# Patient Record
Sex: FEMALE | Race: BLACK OR AFRICAN AMERICAN | NOT HISPANIC OR LATINO | Employment: FULL TIME | URBAN - METROPOLITAN AREA
[De-identification: names, ages, dates, MRNs, and addresses within clinical notes are randomized per-mention and may not be internally consistent; named-entity substitution may affect disease eponyms.]

---

## 2020-08-07 ENCOUNTER — TRANSFERRED RECORDS (OUTPATIENT)
Dept: MULTI SPECIALTY CLINIC | Facility: CLINIC | Age: 30
End: 2020-08-07

## 2020-08-07 LAB
HPV ABSTRACT: NORMAL
PAP-ABSTRACT: NORMAL

## 2022-01-29 ENCOUNTER — OFFICE VISIT (OUTPATIENT)
Dept: FAMILY MEDICINE | Facility: CLINIC | Age: 32
End: 2022-01-29
Payer: COMMERCIAL

## 2022-01-29 VITALS
DIASTOLIC BLOOD PRESSURE: 78 MMHG | HEART RATE: 76 BPM | OXYGEN SATURATION: 100 % | SYSTOLIC BLOOD PRESSURE: 114 MMHG | WEIGHT: 232 LBS | RESPIRATION RATE: 18 BRPM | TEMPERATURE: 99 F

## 2022-01-29 DIAGNOSIS — M94.0 COSTOCHONDRITIS: ICD-10-CM

## 2022-01-29 DIAGNOSIS — J45.21 MILD INTERMITTENT ASTHMA WITH EXACERBATION: Primary | ICD-10-CM

## 2022-01-29 PROCEDURE — 99204 OFFICE O/P NEW MOD 45 MIN: CPT | Mod: 25 | Performed by: PHYSICIAN ASSISTANT

## 2022-01-29 PROCEDURE — 96372 THER/PROPH/DIAG INJ SC/IM: CPT | Performed by: PHYSICIAN ASSISTANT

## 2022-01-29 RX ORDER — MONTELUKAST SODIUM 10 MG/1
10 TABLET ORAL AT BEDTIME
Qty: 30 TABLET | Refills: 1 | Status: SHIPPED | OUTPATIENT
Start: 2022-01-29 | End: 2022-03-15

## 2022-01-29 RX ORDER — CLINDAMYCIN PHOSPHATE 10 UG/ML
LOTION TOPICAL
COMMUNITY
Start: 2021-11-12 | End: 2022-03-15

## 2022-01-29 RX ORDER — TRETINOIN 0.5 MG/G
1 CREAM TOPICAL
COMMUNITY
Start: 2021-11-12 | End: 2022-03-15

## 2022-01-29 RX ORDER — METHYLPREDNISOLONE SOD SUCC 125 MG
125 VIAL (EA) INJECTION ONCE
Status: COMPLETED | OUTPATIENT
Start: 2022-01-29 | End: 2022-01-29

## 2022-01-29 RX ORDER — DOXYCYCLINE 100 MG/1
100 CAPSULE ORAL
COMMUNITY
Start: 2021-11-12 | End: 2022-03-15

## 2022-01-29 RX ORDER — DIPHENHYDRAMINE HCL 25 MG
25 CAPSULE ORAL
COMMUNITY
Start: 2021-06-20 | End: 2022-03-15

## 2022-01-29 RX ORDER — ALBUTEROL SULFATE 90 UG/1
2 AEROSOL, METERED RESPIRATORY (INHALATION) EVERY 6 HOURS
Qty: 18 G | Refills: 0 | Status: SHIPPED | OUTPATIENT
Start: 2022-01-29 | End: 2022-03-15

## 2022-01-29 RX ORDER — ALBUTEROL SULFATE 90 UG/1
AEROSOL, METERED RESPIRATORY (INHALATION)
COMMUNITY
End: 2022-03-15

## 2022-01-29 RX ORDER — ALBUTEROL SULFATE 90 UG/1
1-2 AEROSOL, METERED RESPIRATORY (INHALATION)
COMMUNITY
Start: 2022-01-19

## 2022-01-29 RX ADMIN — Medication 125 MG: at 15:57

## 2022-01-29 ASSESSMENT — ENCOUNTER SYMPTOMS
COUGH: 1
CARDIOVASCULAR NEGATIVE: 1
NEUROLOGICAL NEGATIVE: 1
CONSTITUTIONAL NEGATIVE: 1
WHEEZING: 1
SHORTNESS OF BREATH: 1

## 2022-01-29 NOTE — PATIENT INSTRUCTIONS
Patient Education     Discharge Instructions for Asthma  You have been diagnosed with an asthma attack. With the help of your healthcare provider, you can keep your asthma under control and have less emergency department visits and stays in the hospital.    Managing asthma    Take your asthma medicines exactly as your provider tells you. Do this even if you feel that your athma is under control.    Learn how to monitor your asthma. Some people watch for early changes of symptoms getting worse. Some use a peak flow meter. Your healthcare provider may decide to give you an asthma action plan.    Be sure to always have a quick-relief inhaler with you. If you were given a prescription, make sure you go to a pharmacy to get it filled as soon as possible.  Controlling asthma triggers  Triggers are those things that make your asthma symptoms worse or cause asthma attacks. Many people with asthma have allergies that can be triggers. Your healthcare provider may have you get allergy testing to find out what you are allergic to. This can help you stay away from triggers.  Dust or dust mites are a common asthma trigger. To avoid a dust mites, do the following:    Use dust-proof covers on your mattress and pillows. Wash the sheets and blankets on your bed once a week in very hot water.    Don t sleep or lie on cloth-covered cushions or furniture.    Ask someone else to vacuum and dust your house.    If you do vacuum and dust yourself, wear a dust mask. You can buy them from the [employer] store.    Use a vacuum with a double-layered bag or HEPA (high-efficiency particulate air) filter.  Pets with fur or feathers are triggers for some people. If you must have pets, take these precautions:    Keep pets out of your bedroom and off your bed. Keep the bedroom door closed.    Cover the air vents in your bedroom with heavy material to filter the air.    Don't use carpets or cloth-covered furniture in your home. If this is not  [FreeTextEntry1] : EKG:WNL'\par has PVD asx\par f/u with Dr pruitt\par feel LDL should be < 70mg%\par discussed trying lipitor/ Repatha- will try increasing Livalo and gave slip for labs in 2 month possible, keep pets out of rooms with these items.    Have someone bathe your pets every week. And brush them often.  If you smoke, do your best to quit.    Enroll in a stop-smoking program to increase your chance of success.    Ask your healthcare provider about medicines or other methods to help you quit.    Ask family members to quit smoking as well.    Don t allow anyone to smoke in your home, in your car, or around you.  Other steps to take    Make sure you know what to do if exercise is a trigger for you. Many people use quick-relief inhalers before exercise or physical activity.    Get a flu shot every year and get pneumonia shots as advised by your healthcare provider.    Try to keep your windows closed during pollen seasons and when mold counts are high.    On cold or windy days, cover your nose and mouth with a scarf.    Try to stay away from people who are sick with colds or the flu. Wash your hands often or use a hand . If respiratory infections like colds or flu trigger your asthma, use your quick-relief medicines as soon as you begin to notice respiratory symptoms. They may include a runny or stuffy nose, sore throat, or a cough.  Follow-up care  Make a follow-up appointment as directed. Follow your asthma action plan if you were given one.  Call 911  Call 911 right away if you have:    Severe wheezing    Shortness of breath that is not relieved by your quick-relief medicine    Trouble walking or talking because of shortness of breath    Blue lips or fingernails    If you monitor symptoms with a peak flow meter, readings less than 50% of your personal best   [employer] last reviewed this educational content on 2/3/2017    1854-4628 [employer]. All rights reserved. This information is not intended as a substitute for professional medical care. Always follow your healthcare professional's instructions.

## 2022-01-29 NOTE — PROGRESS NOTES
Assessment & Plan     Mild intermittent asthma with exacerbation    - albuterol (PROAIR HFA/PROVENTIL HFA/VENTOLIN HFA) 108 (90 Base) MCG/ACT inhaler  Dispense: 18 g; Refill: 0  - montelukast (SINGULAIR) 10 MG tablet  Dispense: 30 tablet; Refill: 1    Costochondritis    - albuterol (PROAIR HFA/PROVENTIL HFA/VENTOLIN HFA) 108 (90 Base) MCG/ACT inhaler  Dispense: 18 g; Refill: 0  - montelukast (SINGULAIR) 10 MG tablet  Dispense: 30 tablet; Refill: 1     IM SoluMedrol 125 mg given in R glute by Theodore Lee PA-C.     Take steroid starting tomorrow and Singulair as needed. Monitor for new or worsening symptoms. If fever develops, to return to clinic.     Return in about 1 week (around 2/5/2022), or if symptoms worsen or fail to improve.    Subjective     Brandee is a 31 year old female who presents to clinic today  for the following health issues:  Chief Complaint   Patient presents with     Asthma     chest hurts and wheezing cough hurts inhaler not helping      Brandee presets with reports of shortness of breath and cough. She reports she usually just gets albuterol in the winter and may take Singulair as needed. She reports she has been coughing, unable to sleep, unable to catch breath. She denies fevers,COVID concern. She has been using humidifier as well.           Review of Systems   Constitutional: Negative.    HENT: Negative.    Respiratory: Positive for cough, shortness of breath and wheezing.    Cardiovascular: Negative.    Neurological: Negative.            Objective    /78   Pulse 76   Temp 99  F (37.2  C) (Oral)   Resp 18   Wt 105.2 kg (232 lb)   LMP 01/14/2022 (Approximate)   SpO2 100%   Physical Exam  Constitutional:       Appearance: Normal appearance.   HENT:      Head: Normocephalic and atraumatic.   Cardiovascular:      Rate and Rhythm: Normal rate and regular rhythm.      Heart sounds: Normal heart sounds.   Pulmonary:      Breath sounds: Decreased air movement present. Examination  of the right-lower field reveals wheezing. Examination of the left-lower field reveals wheezing. Wheezing present.   Musculoskeletal:      Cervical back: Normal range of motion and neck supple.   Skin:     General: Skin is warm and dry.   Neurological:      General: No focal deficit present.      Mental Status: She is alert and oriented to person, place, and time.   Psychiatric:         Mood and Affect: Mood normal.         Behavior: Behavior normal.         Thought Content: Thought content normal.         Judgment: Judgment normal.              Theodore Lee PA-C

## 2022-01-30 ENCOUNTER — TELEPHONE (OUTPATIENT)
Dept: URGENT CARE | Facility: URGENT CARE | Age: 32
End: 2022-01-30
Payer: COMMERCIAL

## 2022-01-30 DIAGNOSIS — J45.21 MILD INTERMITTENT ASTHMA WITH EXACERBATION: Primary | ICD-10-CM

## 2022-01-30 RX ORDER — METHYLPREDNISOLONE 4 MG
TABLET, DOSE PACK ORAL
Qty: 21 TABLET | Refills: 0 | Status: SHIPPED | OUTPATIENT
Start: 2022-01-30 | End: 2022-03-15

## 2022-03-06 ENCOUNTER — APPOINTMENT (OUTPATIENT)
Dept: ULTRASOUND IMAGING | Facility: CLINIC | Age: 32
End: 2022-03-06
Attending: STUDENT IN AN ORGANIZED HEALTH CARE EDUCATION/TRAINING PROGRAM
Payer: COMMERCIAL

## 2022-03-06 ENCOUNTER — APPOINTMENT (OUTPATIENT)
Dept: CT IMAGING | Facility: CLINIC | Age: 32
End: 2022-03-06
Attending: STUDENT IN AN ORGANIZED HEALTH CARE EDUCATION/TRAINING PROGRAM
Payer: COMMERCIAL

## 2022-03-06 ENCOUNTER — HOSPITAL ENCOUNTER (EMERGENCY)
Facility: CLINIC | Age: 32
Discharge: HOME | End: 2022-03-06
Attending: STUDENT IN AN ORGANIZED HEALTH CARE EDUCATION/TRAINING PROGRAM | Admitting: STUDENT IN AN ORGANIZED HEALTH CARE EDUCATION/TRAINING PROGRAM
Payer: COMMERCIAL

## 2022-03-06 VITALS
HEART RATE: 92 BPM | BODY MASS INDEX: 35.61 KG/M2 | TEMPERATURE: 99.1 F | RESPIRATION RATE: 18 BRPM | DIASTOLIC BLOOD PRESSURE: 79 MMHG | HEIGHT: 68 IN | SYSTOLIC BLOOD PRESSURE: 121 MMHG | OXYGEN SATURATION: 100 % | WEIGHT: 235 LBS

## 2022-03-06 DIAGNOSIS — R10.31 RIGHT LOWER QUADRANT PAIN: ICD-10-CM

## 2022-03-06 DIAGNOSIS — R10.31 RIGHT GROIN PAIN: ICD-10-CM

## 2022-03-06 LAB
ALBUMIN UR-MCNC: NEGATIVE MG/DL
ANION GAP SERPL CALCULATED.3IONS-SCNC: 8 MMOL/L (ref 5–18)
APPEARANCE UR: CLEAR
BILIRUB UR QL STRIP: NEGATIVE
BUN SERPL-MCNC: 11 MG/DL (ref 8–22)
CALCIUM SERPL-MCNC: 9.4 MG/DL (ref 8.5–10.5)
CHLORIDE BLD-SCNC: 109 MMOL/L (ref 98–107)
CO2 SERPL-SCNC: 22 MMOL/L (ref 22–31)
COLOR UR AUTO: ABNORMAL
CREAT SERPL-MCNC: 0.91 MG/DL (ref 0.6–1.1)
ERYTHROCYTE [DISTWIDTH] IN BLOOD BY AUTOMATED COUNT: 14.2 % (ref 10–15)
GFR SERPL CREATININE-BSD FRML MDRD: 86 ML/MIN/1.73M2
GLUCOSE BLD-MCNC: 88 MG/DL (ref 70–125)
GLUCOSE UR STRIP-MCNC: NEGATIVE MG/DL
HCG UR QL: NEGATIVE
HCT VFR BLD AUTO: 37.9 % (ref 35–47)
HGB BLD-MCNC: 11.6 G/DL (ref 11.7–15.7)
HGB UR QL STRIP: NEGATIVE
INTERNAL QC OK POCT: NORMAL
KETONES UR STRIP-MCNC: NEGATIVE MG/DL
LEUKOCYTE ESTERASE UR QL STRIP: NEGATIVE
MCH RBC QN AUTO: 26.5 PG (ref 26.5–33)
MCHC RBC AUTO-ENTMCNC: 30.6 G/DL (ref 31.5–36.5)
MCV RBC AUTO: 87 FL (ref 78–100)
MUCOUS THREADS #/AREA URNS LPF: PRESENT /LPF
NITRATE UR QL: NEGATIVE
PH UR STRIP: 6.5 [PH] (ref 5–7)
PLATELET # BLD AUTO: 271 10E3/UL (ref 150–450)
POCT KIT EXPIRATION DATE: NORMAL
POCT KIT LOT NUMBER: NORMAL
POTASSIUM BLD-SCNC: 4 MMOL/L (ref 3.5–5)
RBC # BLD AUTO: 4.37 10E6/UL (ref 3.8–5.2)
RBC URINE: <1 /HPF
SODIUM SERPL-SCNC: 139 MMOL/L (ref 136–145)
SP GR UR STRIP: 1.02 (ref 1–1.03)
SQUAMOUS EPITHELIAL: <1 /HPF
UROBILINOGEN UR STRIP-MCNC: <2 MG/DL
WBC # BLD AUTO: 5.2 10E3/UL (ref 4–11)
WBC URINE: 1 /HPF

## 2022-03-06 PROCEDURE — 74176 CT ABD & PELVIS W/O CONTRAST: CPT

## 2022-03-06 PROCEDURE — 76830 TRANSVAGINAL US NON-OB: CPT

## 2022-03-06 PROCEDURE — 250N000011 HC RX IP 250 OP 636: Performed by: STUDENT IN AN ORGANIZED HEALTH CARE EDUCATION/TRAINING PROGRAM

## 2022-03-06 PROCEDURE — 250N000013 HC RX MED GY IP 250 OP 250 PS 637: Performed by: STUDENT IN AN ORGANIZED HEALTH CARE EDUCATION/TRAINING PROGRAM

## 2022-03-06 PROCEDURE — 85027 COMPLETE CBC AUTOMATED: CPT | Performed by: STUDENT IN AN ORGANIZED HEALTH CARE EDUCATION/TRAINING PROGRAM

## 2022-03-06 PROCEDURE — 81025 URINE PREGNANCY TEST: CPT | Performed by: STUDENT IN AN ORGANIZED HEALTH CARE EDUCATION/TRAINING PROGRAM

## 2022-03-06 PROCEDURE — 81001 URINALYSIS AUTO W/SCOPE: CPT | Performed by: STUDENT IN AN ORGANIZED HEALTH CARE EDUCATION/TRAINING PROGRAM

## 2022-03-06 PROCEDURE — 82310 ASSAY OF CALCIUM: CPT | Performed by: STUDENT IN AN ORGANIZED HEALTH CARE EDUCATION/TRAINING PROGRAM

## 2022-03-06 PROCEDURE — 99285 EMERGENCY DEPT VISIT HI MDM: CPT | Mod: 25

## 2022-03-06 PROCEDURE — 36415 COLL VENOUS BLD VENIPUNCTURE: CPT | Performed by: STUDENT IN AN ORGANIZED HEALTH CARE EDUCATION/TRAINING PROGRAM

## 2022-03-06 RX ORDER — ONDANSETRON 4 MG/1
4 TABLET, ORALLY DISINTEGRATING ORAL ONCE
Status: COMPLETED | OUTPATIENT
Start: 2022-03-06 | End: 2022-03-06

## 2022-03-06 RX ORDER — ACETAMINOPHEN 325 MG/1
650 TABLET ORAL ONCE
Status: COMPLETED | OUTPATIENT
Start: 2022-03-06 | End: 2022-03-06

## 2022-03-06 RX ORDER — ONDANSETRON 4 MG/1
4 TABLET, ORALLY DISINTEGRATING ORAL EVERY 8 HOURS PRN
Qty: 10 TABLET | Refills: 0 | Status: SHIPPED | OUTPATIENT
Start: 2022-03-06 | End: 2022-03-09

## 2022-03-06 RX ADMIN — ONDANSETRON 4 MG: 4 TABLET, ORALLY DISINTEGRATING ORAL at 14:09

## 2022-03-06 RX ADMIN — ACETAMINOPHEN 650 MG: 325 TABLET ORAL at 15:16

## 2022-03-06 ASSESSMENT — ENCOUNTER SYMPTOMS
FREQUENCY: 0
DIARRHEA: 0
BACK PAIN: 1
COUGH: 0
FEVER: 0
DYSURIA: 0
NAUSEA: 1
HEMATURIA: 0
VOMITING: 0
CONSTIPATION: 0
ROS GI COMMENTS: POSITIVE FOR BLOATING.
ABDOMINAL PAIN: 1

## 2022-03-06 NOTE — DISCHARGE INSTRUCTIONS
There is a small fibroid in the uterus.  These are benign muscle growths of the uterus and do not need any follow-up.  There is a small amount of fluid on the ultrasound in the pelvis, suspect he may have ruptured a ovarian cyst.  As we discussed we did see that abnormal finding on your lung  Please follow up closely with a primary care doctor doctor for CT of this for monitoring and to follow up on your pain    Return if you have significant increase in pain, unable to keep down foods/fluids or any other worsening symptoms

## 2022-03-06 NOTE — ED TRIAGE NOTES
Patient has right sided pelvic pain radiating to right buttocks and posterior thigh. Intermittent since Friday. Nausea.

## 2022-03-06 NOTE — ED PROVIDER NOTES
EMERGENCY DEPARTMENT SIGN OUT NOTE        ED COURSE AND MEDICAL DECISION MAKING  Patient was signed out to me by Dr Afia Morgan at 4:10 PM  5:28 PM I rechecked and updated the patient.      In brief, Brandee Nesbitt is a 31 year old female who initially presented with RLQ abdominal pain beginning on 3/4. Is nausea and last bowel movement was on 3/4 but notes this is normal for her. Denies any vomiting, diarrhea, vaginal discharge, or any other complaints.      At time of sign out, disposition was pending ultrasound results.  Ultrasound shows fibroid.  There is also a small amount of free fluid in the right adnexa on imaging, question ruptured cyst.  Regardless her pain is improved, she is safe for discharge to home.  Encouraged PCP follow-up.    FINAL IMPRESSION    1. Right lower quadrant pain    2. Right groin pain        ED MEDS  Medications   ondansetron (ZOFRAN-ODT) ODT tab 4 mg (4 mg Oral Given 3/6/22 1409)   acetaminophen (TYLENOL) tablet 650 mg (650 mg Oral Given 3/6/22 1516)       LAB  Labs Ordered and Resulted from Time of ED Arrival to Time of ED Departure   CBC WITH PLATELETS - Abnormal       Result Value    WBC Count 5.2      RBC Count 4.37      Hemoglobin 11.6 (*)     Hematocrit 37.9      MCV 87      MCH 26.5      MCHC 30.6 (*)     RDW 14.2      Platelet Count 271     BASIC METABOLIC PANEL - Abnormal    Sodium 139      Potassium 4.0      Chloride 109 (*)     Carbon Dioxide (CO2) 22      Anion Gap 8      Urea Nitrogen 11      Creatinine 0.91      Calcium 9.4      Glucose 88      GFR Estimate 86     ROUTINE UA WITH MICROSCOPIC REFLEX TO CULTURE - Abnormal    Color Urine Light Yellow      Appearance Urine Clear      Glucose Urine Negative      Bilirubin Urine Negative      Ketones Urine Negative      Specific Gravity Urine 1.019      Blood Urine Negative      pH Urine 6.5      Protein Albumin Urine Negative      Urobilinogen Urine <2.0      Nitrite Urine Negative      Leukocyte Esterase Urine Negative       Mucus Urine Present (*)     RBC Urine <1      WBC Urine 1      Squamous Epithelials Urine <1     HCG QUALITATIVE URINE POCT - Normal    HCG Qual Urine Negative      Internal QC Check POCT Valid      POCT Kit Lot Number [id number]      POCT Kit Expiration Date 07/31/2023         RADIOLOGY    US Pelvic Complete with Transvaginal   Final Result   IMPRESSION:   1.  Small uterine fibroid. Otherwise negative.               CT Abdomen Pelvis w/o Contrast   Final Result   IMPRESSION:    1.  No findings are identified to explain patient's right lower quadrant pain. No evidence of appendicitis, bowel inflammation, or bowel obstruction.   2.  Bibasilar pulmonary nodules including a masslike infiltrate measuring 14 x 19 mm in size within the lingula. These findings may reflect sequela of infectious process though are nonspecific. Consider dedicated chest CT for complete evaluation of    lungs. Otherwise, at minimum, a 3 month follow-up chest CT is recommended to ensure resolution.      REFERENCE:   Guidelines for Management of Incidental Pulmonary Nodules Detected on CT Images: From the Fleischner Society 2017.    Guidelines apply to incidental nodules in patients who are 35 years or older.   Guidelines do not apply to lung cancer screening, patients with immunosuppression, or patients with known primary cancer.      MULTIPLE NODULES   Nodule size <6 mm   Low-risk patients: No follow-up needed.   High-risk patients: Optional follow-up at 12 months.      Nodule size 6 mm or larger   Low-risk patients: Follow-up CT at 3-6 months, then consider CT at 18-24 months.   High-risk patients: Follow-up CT at 3-6 months, then at 18-24 months if no change.   -Use most suspicious nodule as guide to management.      Consider referral to lung nodule clinic.             DISCHARGE MEDS  Discharge Medication List as of 3/6/2022  5:35 PM      START taking these medications    Details   ondansetron (ZOFRAN ODT) 4 MG ODT tab Take 1 tablet (4 mg)  by mouth every 8 hours as needed for nausea or vomiting, Disp-10 tablet, R-0, Local Print               Carmen Cohen MD  Emergency Medicine  [facility] EMERGENCY ROOM  [address] [facility] [address] [phone]      Carmen Cohen MD  03/06/22 7477

## 2022-03-06 NOTE — ED PROVIDER NOTES
NAME: Brandee Nesbitt  AGE: 31 year old female  YOB: 1990  MRN: [id number]  EVALUATION DATE & TIME: 3/6/2022  1:44 PM    PCP: No Ref-Primary, Physician    ED PROVIDER: Afia Morgan MD.      Chief Complaint   Patient presents with     Pelvic Pain     FINAL IMPRESSION:  1. Right lower quadrant pain    2. Right groin pain        MEDICAL DECISION MAKIN:53 PM I met with the patient, obtained history, performed an initial exam, and discussed options and plan for diagnostics and treatment here in the ED.   3:06 PM I rechecked and updated the patient with results.    Pertinent Labs & Imaging studies reviewed. (See chart for details)     31 year old female with history of breast cancer who  presents to the Emergency Department for evaluation of abdominal/groin pain. Vitals are reassuring and she is well appearing. On exam has tenderness to RLQ/right groin. No signs of septic hip. No spinal tenderness or neurologic deficits. Dx includes but not limited to appendicitis, ovarian pathology, muscle strain/injury, stone, among others. No urinary symptoms to suggest pyelo/UTI. No abnormal discharge to suspect PID. CT abd/pelvis without (is allergic to contrast dye): showed no acute findings to explain pain, informed her of finding on her lung base states she has had this prior and will follow up in clinic for monitoring. She agreed to zofran and tylenol. She declined stronger pain medications. Plan to obtained pelvic US to further evaluate. She was signed out to my colleague Dr. Cohen pending pelvic US results.     PPE worn: surgical mask, goggles.     MEDICATIONS GIVEN IN THE EMERGENCY:  Medications   ondansetron (ZOFRAN-ODT) ODT tab 4 mg (4 mg Oral Given 3/6/22 1409)   acetaminophen (TYLENOL) tablet 650 mg (650 mg Oral Given 3/6/22 1516)       NEW PRESCRIPTIONS STARTED AT TODAY'S ER VISIT:  New Prescriptions    ONDANSETRON (ZOFRAN ODT) 4 MG ODT TAB    Take 1 tablet (4 mg) by mouth every 8 hours as  needed for nausea or vomiting          =================================================================    HPI    Patient information was obtained from: patient     Use of : N/A       Brandee Nesbitt is a 31 year old female with a past medical history of s/p cholecystectomy, s/p  section, GERD, obesity, and h/o breast cancer s/p right mastectomy, who presents for evaluation of abdominal pain.     Patient reports RLQ abdominal pain which began on 3/4/22. Her pain is intermittent and radiates into her back and buttocks. Pain feels more intense than menstrual cramps. Also notes she has been nauseous, gassy, and had an upset stomach the last few days. Last bowel movement was on 3/4 and notes it is baseline for her to go a few days between bowel movements. Denies vomiting, diarrhea, urinary symptoms, vaginal discharge, fever, or cough. No recent falls or known covid exposures. Patient is not on birth control. Is unsure if she is pregnant. No other complaints or concerns expressed at this time.    REVIEW OF SYSTEMS   Review of Systems   Constitutional: Negative for fever.   Respiratory: Negative for cough.    Gastrointestinal: Positive for abdominal pain and nausea. Negative for constipation, diarrhea and vomiting.        Positive for bloating.   Genitourinary: Negative for dysuria, frequency, hematuria and vaginal discharge.   Musculoskeletal: Positive for back pain (from radiating abdominal pain).   All other systems reviewed and are negative.       PAST MEDICAL HISTORY:  Past Medical History:   Diagnosis Date     Breast cancer (H)      Gastroesophageal reflux disease      Mild intermittent asthma without complication      Obesity        PAST SURGICAL HISTORY:  Past Surgical History:   Procedure Laterality Date      SECTION       CHOLECYSTECTOMY       right mastectomy         CURRENT MEDICATIONS:    No current facility-administered medications for this encounter.    Current Outpatient  Medications:      ondansetron (ZOFRAN ODT) 4 MG ODT tab, Take 1 tablet (4 mg) by mouth every 8 hours as needed for nausea or vomiting, Disp: 10 tablet, Rfl: 0     albuterol (PROAIR HFA/PROVENTIL HFA/VENTOLIN HFA) 108 (90 Base) MCG/ACT inhaler, Inhale 1-2 puffs into the lungs, Disp: , Rfl:      albuterol (PROAIR HFA/PROVENTIL HFA/VENTOLIN HFA) 108 (90 Base) MCG/ACT inhaler, , Disp: , Rfl:      albuterol (PROAIR HFA/PROVENTIL HFA/VENTOLIN HFA) 108 (90 Base) MCG/ACT inhaler, Inhale 2 puffs into the lungs every 6 hours, Disp: 18 g, Rfl: 0     clindamycin (CLEOCIN T) 1 % external lotion, APPLY TOPICALLY EVERY MORNING (Patient not taking: Reported on 1/29/2022), Disp: , Rfl:      diphenhydrAMINE (BENADRYL) 25 MG capsule, Take 25 mg by mouth (Patient not taking: Reported on 1/29/2022), Disp: , Rfl:      doxycycline monohydrate (MONODOX) 100 MG capsule, Take 100 mg by mouth (Patient not taking: Reported on 1/29/2022), Disp: , Rfl:      methylPREDNISolone (MEDROL DOSEPAK) 4 MG tablet therapy pack, Follow Package Directions, Disp: 21 tablet, Rfl: 0     montelukast (SINGULAIR) 10 MG tablet, Take 1 tablet (10 mg) by mouth At Bedtime, Disp: 30 tablet, Rfl: 1     tretinoin (RETIN-A) 0.05 % external cream, Apply 1 Application topically, Disp: , Rfl:     ALLERGIES:  Allergies   Allergen Reactions     Diagnostic X-Ray Materials Shortness Of Breath, Other (See Comments) and Dizziness     Contrast dye-Shortness of breath  Pt became sleepy and confused. Needed to remind the pt to keep breathing, but denied any itching, swelling, or breathing difficulties.     Naproxen Shortness Of Breath     Phentermine Other (See Comments)     Palpitations, chest pain     Vancomycin Itching, Other (See Comments) and Rash     After few minutes, redness and itching noted in forearm. Symptoms diminished in few minutes after rate decreased by 1/2.    Patient has history of vancomycin infusion reaction. For further doses, vancomycin should be infused at a  "rate no higher than 10 mg/min or each gram over 100 minutes.  May also consider administering diphenhydramine and famotidine one hour before infusion.  Rash all over body itchy         FAMILY HISTORY:  History reviewed. No pertinent family history.    SOCIAL HISTORY:   Social History     Socioeconomic History     Marital status: Single     Spouse name: Not on file     Number of children: Not on file     Years of education: Not on file     Highest education level: Not on file   Occupational History     Not on file   Tobacco Use     Smoking status: Never Smoker     Smokeless tobacco: Never Used   Substance and Sexual Activity     Alcohol use: Not on file     Drug use: Not on file     Sexual activity: Not on file   Other Topics Concern     Not on file   Social History Narrative     Not on file     Social Determinants of Health     Financial Resource Strain: Not on file   Food Insecurity: Not on file   Transportation Needs: Not on file   Physical Activity: Not on file   Stress: Not on file   Social Connections: Not on file   Intimate Partner Violence: Not on file   Housing Stability: Not on file       PHYSICAL EXAM:    Vitals: /79   Pulse 92   Temp 99.1  F (37.3  C) (Oral)   Resp 18   Ht 1.727 m (5' 8\")   Wt 106.6 kg (235 lb)   LMP 02/10/2022   SpO2 100%   BMI 35.73 kg/m     Constitutional: Well developed, well nourished. Comfortable appearing.  HENT: Normocephalic, atraumatic, mucous membranes moist, nose normal. Neck- Supple, gross ROM intact.   Eyes: Pupils mid-range, sclera white, no discharge  Respiratory: Clear to auscultation bilaterally, no respiratory distress, no wheezing, speaks full sentences easily.  Cardiovascular: Normal heart rate, regular rhythm, no murmurs. No lower extremity edema   GI: Soft, RLQ tenderness, no masses.  Musculoskeletal: Moving all 4 extremities intentionally and without pain. No obvious deformity. No tenderness or erythema to right hip.   Back: No midline spinal " tenderness or flank tenderness  Skin: Warm, dry, no rash.  Neurologic: Alert & oriented x 3, speech clear, moving all extremities spontaneously   Psychiatric: Affect normal, cooperative.     LAB:  All pertinent labs reviewed and interpreted.  Labs Ordered and Resulted from Time of ED Arrival to Time of ED Departure   CBC WITH PLATELETS - Abnormal       Result Value    WBC Count 5.2      RBC Count 4.37      Hemoglobin 11.6 (*)     Hematocrit 37.9      MCV 87      MCH 26.5      MCHC 30.6 (*)     RDW 14.2      Platelet Count 271     BASIC METABOLIC PANEL - Abnormal    Sodium 139      Potassium 4.0      Chloride 109 (*)     Carbon Dioxide (CO2) 22      Anion Gap 8      Urea Nitrogen 11      Creatinine 0.91      Calcium 9.4      Glucose 88      GFR Estimate 86     ROUTINE UA WITH MICROSCOPIC REFLEX TO CULTURE - Abnormal    Color Urine Light Yellow      Appearance Urine Clear      Glucose Urine Negative      Bilirubin Urine Negative      Ketones Urine Negative      Specific Gravity Urine 1.019      Blood Urine Negative      pH Urine 6.5      Protein Albumin Urine Negative      Urobilinogen Urine <2.0      Nitrite Urine Negative      Leukocyte Esterase Urine Negative      Mucus Urine Present (*)     RBC Urine <1      WBC Urine 1      Squamous Epithelials Urine <1     HCG QUALITATIVE URINE POCT - Normal    HCG Qual Urine Negative      Internal QC Check POCT Valid      POCT Kit Lot Number [id number]      POCT Kit Expiration Date 07/31/2023         RADIOLOGY:  CT Abdomen Pelvis w/o Contrast   Final Result   IMPRESSION:    1.  No findings are identified to explain patient's right lower quadrant pain. No evidence of appendicitis, bowel inflammation, or bowel obstruction.   2.  Bibasilar pulmonary nodules including a masslike infiltrate measuring 14 x 19 mm in size within the lingula. These findings may reflect sequela of infectious process though are nonspecific. Consider dedicated chest CT for complete evaluation of    lungs.  Otherwise, at minimum, a 3 month follow-up chest CT is recommended to ensure resolution.      REFERENCE:   Guidelines for Management of Incidental Pulmonary Nodules Detected on CT Images: From the Fleischner Society 2017.    Guidelines apply to incidental nodules in patients who are 35 years or older.   Guidelines do not apply to lung cancer screening, patients with immunosuppression, or patients with known primary cancer.      MULTIPLE NODULES   Nodule size <6 mm   Low-risk patients: No follow-up needed.   High-risk patients: Optional follow-up at 12 months.      Nodule size 6 mm or larger   Low-risk patients: Follow-up CT at 3-6 months, then consider CT at 18-24 months.   High-risk patients: Follow-up CT at 3-6 months, then at 18-24 months if no change.   -Use most suspicious nodule as guide to management.      Consider referral to lung nodule clinic.         US Pelvic Complete with Transvaginal    (Results Pending)     PROCEDURES:   Procedures       I, Filiberto Gaxiola, am serving as a scribe to document services personally performed by Dr. Afia Morgan based on my observation and the provider's statements to me. I, Afia Morgan MD attest that Filiberto Gaxiola is acting in a scribe capacity, has observed my performance of the services and has documented them in accordance with my direction.      Afia Morgan M.D.  Emergency Medicine  [facility] EMERGENCY ROOM  [address] [facility] [id number] [phone]  Dept: [phone]     Afia Morgan MD  03/06/22 9674

## 2022-03-11 ENCOUNTER — OFFICE VISIT (OUTPATIENT)
Dept: FAMILY MEDICINE | Facility: CLINIC | Age: 32
End: 2022-03-11
Payer: COMMERCIAL

## 2022-03-11 VITALS
SYSTOLIC BLOOD PRESSURE: 99 MMHG | HEART RATE: 96 BPM | DIASTOLIC BLOOD PRESSURE: 69 MMHG | WEIGHT: 231 LBS | OXYGEN SATURATION: 100 % | RESPIRATION RATE: 14 BRPM | BODY MASS INDEX: 35.12 KG/M2 | TEMPERATURE: 98.8 F

## 2022-03-11 DIAGNOSIS — R10.31 RLQ ABDOMINAL PAIN: Primary | ICD-10-CM

## 2022-03-11 DIAGNOSIS — S39.012A STRAIN OF MUSCLE, FASCIA AND TENDON OF LOWER BACK, INITIAL ENCOUNTER: ICD-10-CM

## 2022-03-11 LAB — HCG SERPL-ACNC: 101 MLU/ML (ref 0–4)

## 2022-03-11 PROCEDURE — 84702 CHORIONIC GONADOTROPIN TEST: CPT | Performed by: PHYSICIAN ASSISTANT

## 2022-03-11 PROCEDURE — 36415 COLL VENOUS BLD VENIPUNCTURE: CPT | Performed by: PHYSICIAN ASSISTANT

## 2022-03-11 PROCEDURE — 99214 OFFICE O/P EST MOD 30 MIN: CPT | Performed by: PHYSICIAN ASSISTANT

## 2022-03-11 NOTE — PATIENT INSTRUCTIONS
Patient Education     Back Sprain or Strain     Injury to the muscles (strain) or ligaments (sprain) around the spine can be troubling. Injury may occur after a sudden forceful twisting or bending such as in a car accident, after a simple awkward movement, or after lifting something heavy with poor body positioning. In any case, muscle spasm is often present and adds to the pain.  Thankfully, most people feel better in 1 to 2 weeks. Most of the rest feel better in 1 to 2 months. Most people can remain active. Unless you had a forceful or traumatic physical injury such as a car accident or fall, X-rays may not be done for the first assessment of a back sprain or strain. If pain continues and doesn't respond to medical treatment, your healthcare provider may then do X-rays and other tests.  Home care  These guidelines will help you care for your injury at home:    When in bed, try to find a comfortable position. A firm mattress is best. Try lying flat on your back with pillows under your knees. You can also try lying on your side with your knees bent up toward your chest and a pillow between your knees.    Don't sit for long periods. Try not to take long car rides or take other trips that have you sitting for a long time. This puts more stress on the lower back than standing or walking.    During the first 24 to 72 hours after an injury or flare-up, put an ice pack on the painful area for 20 minutes. Then remove it for 20 minutes. Do this for 60 to 90 minutes, or several times a day. This will reduce swelling and pain. Always wrap the ice pack in a thin towel or plastic to protect your skin.    You can start with ice, then switch to heat. Heat from a hot shower, hot bath, or heating pad reduces pain and works well for muscle spasms. Put heat on the painful area for 20 minutes, then remove for 20 minutes. Do this for 60 to 90 minutes, or several times a day. Don't use a heating pad while sleeping. It can burn the  skin.    You can alternate the ice and heat. Talk with your healthcare provider to find out the best treatment or therapy for your back pain.    Therapeutic massage can help relax the back muscles without stretching them.    Be aware of safe lifting methods. Don't lift anything over 15 pounds until all of the pain is gone.  Medicines  Talk with your healthcare provider before using medicines, especially if you have other health problems or are taking other medicines.    You may use over-the-counter medicines such as acetaminophen, ibuprofen, or naproxen to control pain, unless another pain medicine was prescribed. Talk with your healthcare provider before taking any medicines if you have a chronic condition such as diabetes, liver or kidney disease, stomach ulcers, or digestive bleeding, or are taking blood-thinner medicines.    Be careful if you are given prescription medicines, opioids, or medicine for muscle spasm. They can cause drowsiness, and affect your coordination, reflexes, and judgment. Don't drive or operate heavy machinery when taking these types of medicines. Only take pain medicine as prescribed by your healthcare provider.  Follow-up care  Follow up with your healthcare provider, or as advised. You may need physical therapy or more tests if your symptoms get worse.  If you had X-rays, your healthcare provider may be checking for any broken bones, breaks, or fractures. Bruises and sprains can sometimes hurt as much as a fracture. These injuries can take time to heal fully. If your symptoms don t get better or they get worse, talk with your healthcare provider. You may need a repeat X-ray or other tests.  Call 911  Call 911 if any of the following occur:    Trouble breathing    Confused    Very drowsy or trouble awakening    Fainting or loss of consciousness    Rapid or very slow heart rate    Loss of bowel or bladder control  When to seek medical advice  Call your healthcare provider right away if any  of the following occur:    Pain gets worse or spreads to your arms or legs    Weakness or numbness in one or both arms or legs    Numbness in the groin or genital area  [employer] last reviewed this educational content on 11/1/2019 2000-2021 [employer]. All rights reserved. This information is not intended as a substitute for professional medical care. Always follow your healthcare professional's instructions.

## 2022-03-11 NOTE — PROGRESS NOTES
Assessment & Plan:      Problem List Items Addressed This Visit     None      Visit Diagnoses     RLQ abdominal pain    -  Primary    Relevant Orders    HCG quantitative pregnancy    Ob/Gyn Referral    Strain of muscle, fascia and tendon of lower back, initial encounter            Medical Decision Making  Patient presents to the walk-in care clinic with recent positive urine pregnancy test, ongoing right lower quadrant abdominal pains, and right lower back pains.  Patient had thorough evaluation 3/6 with negative urine pregnancy, negative pelvic ultrasound, and negative abdominal CT for findings consistent with patient's discomfort.  Patient's right lower back pains and groin pains appear consistent with a muscle strain.  Her symptoms are provoked whenever she activates her muscles to elevate her leg.  No signs of radiculopathy on exam with no spinal tenderness.  Recommend warm compresses and acetaminophen.  Did obtain quantitative hCG and placed referral for OB for further follow-up due to recent positive pregnancy test.  Patient otherwise is afebrile with no point tenderness on abdominal exam to make concerns for appendicitis extremely low.  Did discuss with patient that her discomfort could be signs of a possible miscarriage versus ectopic pregnancy versus viable pregnancy, but we will not know until quantitative hCG returns and until possible further follow-up with OB/GYN.    Called patient back as her hCG results were positive.  Patient was unable to set up an urgent appointment with OB/GYN.  She does have follow-up appointment with primary care on 3/14.  Recommend patient keep this appointment as she can get her hCG levels rechecked at that time.  If abdominal pains worsen or she develops new fevers or emesis, instructed patient to be seen in emergency room immediately.     Subjective:      History provided by the patient.  She is also here with her sister.  Brandee Nesbitt is a 31 year old female here  for evaluation of ongoing right lower quadrant abdominal pains with a recent positive urine pregnancy test.  Patient was seen in the emergency room on 3/6 for the same symptoms.  She had a negative urinary pregnancy test at that time.  Abdominal CT and pelvic ultrasound were negative for findings consistent with patient's discomfort.  Patient continues to note discomfort along the right lower back into the right posterior thigh as well as pain within the right groin.  Pain improves as patient gets up and walks.  Pain does not keep patient awake at night.  She does note some associated nausea, but no fevers and no emesis.  Patient denies vaginal bleeding.     The following portions of the patient's history were reviewed and updated as appropriate: allergies, current medications, and problem list.     Review of Systems  Pertinent items are noted in HPI.    Allergies  Allergies   Allergen Reactions     Diagnostic X-Ray Materials Shortness Of Breath, Other (See Comments) and Dizziness     Contrast dye-Shortness of breath  Pt became sleepy and confused. Needed to remind the pt to keep breathing, but denied any itching, swelling, or breathing difficulties.     Naproxen Shortness Of Breath     Phentermine Other (See Comments)     Palpitations, chest pain     Vancomycin Itching, Other (See Comments) and Rash     After few minutes, redness and itching noted in forearm. Symptoms diminished in few minutes after rate decreased by 1/2.    Patient has history of vancomycin infusion reaction. For further doses, vancomycin should be infused at a rate no higher than 10 mg/min or each gram over 100 minutes.  May also consider administering diphenhydramine and famotidine one hour before infusion.  Rash all over body itchy         No family history on file.    Social History     Tobacco Use     Smoking status: Never Smoker     Smokeless tobacco: Never Used   Substance Use Topics     Alcohol use: Yes     Comment: very occasional         Objective:      BP 99/69 (BP Location: Right arm, Patient Position: Sitting, Cuff Size: Adult Large)   Pulse 96   Temp 98.8  F (37.1  C) (Oral)   Resp 14   Wt 104.8 kg (231 lb)   LMP 02/10/2022   SpO2 100%   BMI 35.12 kg/m    General appearance - alert, well appearing, and in no distress and non-toxic  Abdomen - soft, nontender, nondistended, no masses or organomegaly  Back exam - No midline spinal tenderness, no tenderness to the lumbosacral paraspinal muscles  Neurological - Straight leg raise is negative in the right lower extremity, patient does have increased pain when activating the muscles to elevate the right leg  Extremities - Right hip: Some mild tenderness to palpation over the right hip, otherwise full range of motion without difficulty     Lab & Imaging Results    No results found for this or any previous visit (from the past 24 hour(s)).    I personally reviewed these results and discussed findings with the patient.    The use of Dragon/Visual Unity dictation services was used to construct the content of this note; any grammatical errors are non-intentional. Please contact the author directly if you are in need of any clarification.

## 2022-03-15 ENCOUNTER — MYC MEDICAL ADVICE (OUTPATIENT)
Dept: FAMILY MEDICINE | Facility: CLINIC | Age: 32
End: 2022-03-15

## 2022-03-15 ENCOUNTER — OFFICE VISIT (OUTPATIENT)
Dept: FAMILY MEDICINE | Facility: CLINIC | Age: 32
End: 2022-03-15
Payer: COMMERCIAL

## 2022-03-15 VITALS
SYSTOLIC BLOOD PRESSURE: 98 MMHG | HEART RATE: 96 BPM | BODY MASS INDEX: 35.32 KG/M2 | DIASTOLIC BLOOD PRESSURE: 62 MMHG | OXYGEN SATURATION: 99 % | WEIGHT: 232.3 LBS | TEMPERATURE: 98.5 F

## 2022-03-15 DIAGNOSIS — M54.41 ACUTE RIGHT-SIDED LOW BACK PAIN WITH RIGHT-SIDED SCIATICA: ICD-10-CM

## 2022-03-15 DIAGNOSIS — Z32.01 PREGNANCY TEST POSITIVE: Primary | ICD-10-CM

## 2022-03-15 LAB — HCG SERPL-ACNC: 522 MLU/ML (ref 0–4)

## 2022-03-15 PROCEDURE — 99213 OFFICE O/P EST LOW 20 MIN: CPT | Performed by: NURSE PRACTITIONER

## 2022-03-15 PROCEDURE — 84702 CHORIONIC GONADOTROPIN TEST: CPT | Performed by: NURSE PRACTITIONER

## 2022-03-15 PROCEDURE — 36415 COLL VENOUS BLD VENIPUNCTURE: CPT | Performed by: NURSE PRACTITIONER

## 2022-03-15 ASSESSMENT — ASTHMA QUESTIONNAIRES
QUESTION_2 LAST FOUR WEEKS HOW OFTEN HAVE YOU HAD SHORTNESS OF BREATH: NOT AT ALL
ACT_TOTALSCORE: 25
ACT_TOTALSCORE: 25
QUESTION_5 LAST FOUR WEEKS HOW WOULD YOU RATE YOUR ASTHMA CONTROL: COMPLETELY CONTROLLED
QUESTION_1 LAST FOUR WEEKS HOW MUCH OF THE TIME DID YOUR ASTHMA KEEP YOU FROM GETTING AS MUCH DONE AT WORK, SCHOOL OR AT HOME: NONE OF THE TIME
QUESTION_4 LAST FOUR WEEKS HOW OFTEN HAVE YOU USED YOUR RESCUE INHALER OR NEBULIZER MEDICATION (SUCH AS ALBUTEROL): NOT AT ALL
QUESTION_3 LAST FOUR WEEKS HOW OFTEN DID YOUR ASTHMA SYMPTOMS (WHEEZING, COUGHING, SHORTNESS OF BREATH, CHEST TIGHTNESS OR PAIN) WAKE YOU UP AT NIGHT OR EARLIER THAN USUAL IN THE MORNING: NOT AT ALL

## 2022-03-15 NOTE — PATIENT INSTRUCTIONS
We need to recheck a serum hCG today.  This should be roughly double what it was when you had it checked last.    We will also check an OB ultrasound today.    Tylenol for pain.  You could try topical pain patch with lidocaine as well.    Follow-up with OB as originally scheduled

## 2022-03-15 NOTE — PROGRESS NOTES
"  Assessment & Plan     Pregnancy test positive  Quantitative hCG was checked 4 days ago at 101, indicating positive pregnancy test.  She is probably around 3 to 5 weeks pregnant right now.  Given her ongoing right lower abdominal discomfort, we can recheck an ultrasound.  Fortunately, she does have follow-up with OB next week  - HCG quantitative pregnancy; Future  - US OB <14 Weeks w Transvaginal Single; Future    Acute right-sided low back pain with right-sided sciatica  I suspect that she is currently dealing with a flare of her chronic lumbar pain.  Advised Tylenol stretching and heat.  - US OB <14 Weeks w Transvaginal Single; Future    Patient Instructions   We need to recheck a serum hCG today.  This should be roughly double what it was when you had it checked last.    We will also check an OB ultrasound today.    Tylenol for pain.  You could try topical pain patch with lidocaine as well.    Follow-up with OB as originally scheduled       BMI:   Estimated body mass index is 35.32 kg/m  as calculated from the following:    Height as of 3/6/22: 1.727 m (5' 8\").    Weight as of this encounter: 105.4 kg (232 lb 4.8 oz).   Weight management plan: Discussed healthy diet and exercise guidelines    See Patient Instructions    Return in about 6 months (around 9/15/2022).    Hussein Reis [facility]    Joselin Pink is a 31 year old who presents for the following health issues   HPI     Here for ED follow-up.  She presented to the ED on 3/6 with right lower quadrant abdominal pain.  Urine pregnancy test at that time was negative.    She had a CT scan of her abdomen which revealed some pulmonary nodules but was otherwise unremarkable.    A pelvic ultrasound was ordered revealing a small uterine fibroid but was otherwise negative.    She presented to urgent care 4 days ago with persistent discomfort and had a quantitative hCG checked at 101.  She was told that her right " hip/RLQ pain was likely due to an acute flare of her chronic lumbar pain.  She is also told to follow-up with primary care to have an hCG level rechecked.    She continues to have back pain today.  Localizes most of the pain to her right hip area.  Says that she is currently sleeping in a bed but has historically slept on a couch.  She wonders if her sleeping position may be contributing to her discomfort.    She knows that she has tested positive for pregnancy and this is not too surprising for her.  She did schedule an appointment with OB next week.  No reports of vaginal bleeding or lower abdominal pain today      Review of Systems   Constitutional, HEENT, cardiovascular, pulmonary, gi and gu systems are negative, except as otherwise noted.      Objective    BP 98/62 (BP Location: Left arm, Patient Position: Sitting, Cuff Size: Adult Large)   Pulse 96   Temp 98.5  F (36.9  C) (Oral)   Wt 105.4 kg (232 lb 4.8 oz)   LMP 02/10/2022   SpO2 99%   BMI 35.32 kg/m    Body mass index is 35.32 kg/m .  Physical Exam     Difficulty with rising from a seated position in the exam room.  Reports pain with palpation to the right paraspinal musculature as well as the right trochanter area.

## 2022-03-17 ENCOUNTER — HOSPITAL ENCOUNTER (OUTPATIENT)
Dept: ULTRASOUND IMAGING | Facility: CLINIC | Age: 32
Discharge: HOME | End: 2022-03-17
Attending: NURSE PRACTITIONER | Admitting: NURSE PRACTITIONER
Payer: COMMERCIAL

## 2022-03-17 DIAGNOSIS — Z32.01 PREGNANCY TEST POSITIVE: ICD-10-CM

## 2022-03-17 DIAGNOSIS — M54.41 ACUTE RIGHT-SIDED LOW BACK PAIN WITH RIGHT-SIDED SCIATICA: ICD-10-CM

## 2022-03-17 PROCEDURE — 76801 OB US < 14 WKS SINGLE FETUS: CPT

## 2022-03-24 ENCOUNTER — LAB (OUTPATIENT)
Dept: LAB | Facility: CLINIC | Age: 32
End: 2022-03-24

## 2022-03-24 ENCOUNTER — OFFICE VISIT (OUTPATIENT)
Dept: OBGYN | Facility: CLINIC | Age: 32
End: 2022-03-24
Payer: COMMERCIAL

## 2022-03-24 VITALS
DIASTOLIC BLOOD PRESSURE: 70 MMHG | HEART RATE: 102 BPM | OXYGEN SATURATION: 100 % | BODY MASS INDEX: 35.43 KG/M2 | WEIGHT: 233 LBS | SYSTOLIC BLOOD PRESSURE: 116 MMHG

## 2022-03-24 DIAGNOSIS — Z34.90 EARLY STAGE OF PREGNANCY: Primary | ICD-10-CM

## 2022-03-24 DIAGNOSIS — R10.31 RLQ ABDOMINAL PAIN: ICD-10-CM

## 2022-03-24 DIAGNOSIS — Z34.90 EARLY STAGE OF PREGNANCY: ICD-10-CM

## 2022-03-24 DIAGNOSIS — G89.29 CHRONIC RIGHT-SIDED LOW BACK PAIN WITH RIGHT-SIDED SCIATICA: ICD-10-CM

## 2022-03-24 DIAGNOSIS — M54.41 CHRONIC RIGHT-SIDED LOW BACK PAIN WITH RIGHT-SIDED SCIATICA: ICD-10-CM

## 2022-03-24 LAB — HCG SERPL-ACNC: ABNORMAL MLU/ML (ref 0–4)

## 2022-03-24 PROCEDURE — 84702 CHORIONIC GONADOTROPIN TEST: CPT

## 2022-03-24 PROCEDURE — 99203 OFFICE O/P NEW LOW 30 MIN: CPT | Performed by: OBSTETRICS & GYNECOLOGY

## 2022-03-24 PROCEDURE — 36415 COLL VENOUS BLD VENIPUNCTURE: CPT

## 2022-03-24 RX ORDER — PRENATAL VIT/IRON FUM/FOLIC AC 27MG-0.8MG
1 TABLET ORAL DAILY
COMMUNITY
End: 2022-09-06

## 2022-03-25 DIAGNOSIS — Z34.90 EARLY STAGE OF PREGNANCY: Primary | ICD-10-CM

## 2022-04-03 ENCOUNTER — HEALTH MAINTENANCE LETTER (OUTPATIENT)
Age: 32
End: 2022-04-03

## 2022-04-05 ENCOUNTER — MYC MEDICAL ADVICE (OUTPATIENT)
Dept: OBGYN | Facility: CLINIC | Age: 32
End: 2022-04-05
Payer: COMMERCIAL

## 2022-04-08 ENCOUNTER — HOSPITAL ENCOUNTER (EMERGENCY)
Facility: CLINIC | Age: 32
Discharge: HOME | End: 2022-04-09
Attending: EMERGENCY MEDICINE | Admitting: EMERGENCY MEDICINE
Payer: COMMERCIAL

## 2022-04-08 DIAGNOSIS — O21.0 HYPEREMESIS GRAVIDARUM: ICD-10-CM

## 2022-04-08 PROBLEM — S49.92XA INJURY OF LEFT SHOULDER: Status: ACTIVE | Noted: 2020-01-15

## 2022-04-08 PROBLEM — K21.9 GASTROESOPHAGEAL REFLUX DISEASE WITHOUT ESOPHAGITIS: Status: ACTIVE | Noted: 2020-10-04

## 2022-04-08 PROBLEM — R91.1 LUNG NODULE: Status: ACTIVE | Noted: 2020-10-04

## 2022-04-08 PROBLEM — S09.90XS HEADACHES DUE TO OLD HEAD TRAUMA: Status: ACTIVE | Noted: 2020-01-15

## 2022-04-08 PROBLEM — R07.9 CHEST PAIN: Status: ACTIVE | Noted: 2020-10-04

## 2022-04-08 PROBLEM — J45.20 MILD INTERMITTENT ASTHMA WITHOUT COMPLICATION: Status: ACTIVE | Noted: 2017-12-27

## 2022-04-08 PROBLEM — Z90.11 HISTORY OF RIGHT MASTECTOMY: Status: ACTIVE | Noted: 2021-06-18

## 2022-04-08 PROBLEM — N62 MACROMASTIA: Status: ACTIVE | Noted: 2021-04-26

## 2022-04-08 PROBLEM — G44.309 HEADACHES DUE TO OLD HEAD TRAUMA: Status: ACTIVE | Noted: 2020-01-15

## 2022-04-08 PROBLEM — Z17.0 ESTROGEN RECEPTOR POSITIVE STATUS (ER+): Status: ACTIVE | Noted: 2021-05-11

## 2022-04-08 PROBLEM — C50.919 MALIGNANT NEOPLASM OF BREAST (H): Status: ACTIVE | Noted: 2021-09-29

## 2022-04-08 PROBLEM — D05.11 DUCTAL CARCINOMA IN SITU (DCIS) OF RIGHT BREAST: Status: ACTIVE | Noted: 2021-05-11

## 2022-04-08 PROBLEM — R76.8 RED BLOOD CELL ANTIBODY POSITIVE: Status: ACTIVE | Noted: 2021-06-29

## 2022-04-08 LAB
ALBUMIN SERPL-MCNC: 3.5 G/DL (ref 3.5–5)
ALBUMIN UR-MCNC: NEGATIVE MG/DL
ALP SERPL-CCNC: 75 U/L (ref 45–120)
ALT SERPL W P-5'-P-CCNC: 72 U/L (ref 0–45)
ANION GAP SERPL CALCULATED.3IONS-SCNC: 10 MMOL/L (ref 5–18)
APPEARANCE UR: CLEAR
AST SERPL W P-5'-P-CCNC: 28 U/L (ref 0–40)
BASOPHILS # BLD AUTO: 0 10E3/UL (ref 0–0.2)
BASOPHILS NFR BLD AUTO: 0 %
BILIRUB SERPL-MCNC: 0.3 MG/DL (ref 0–1)
BILIRUB UR QL STRIP: NEGATIVE
BUN SERPL-MCNC: 8 MG/DL (ref 8–22)
CALCIUM SERPL-MCNC: 9.7 MG/DL (ref 8.5–10.5)
CHLORIDE BLD-SCNC: 105 MMOL/L (ref 98–107)
CO2 SERPL-SCNC: 22 MMOL/L (ref 22–31)
COLOR UR AUTO: NORMAL
CREAT SERPL-MCNC: 0.75 MG/DL (ref 0.6–1.1)
EOSINOPHIL # BLD AUTO: 0.1 10E3/UL (ref 0–0.7)
EOSINOPHIL NFR BLD AUTO: 2 %
ERYTHROCYTE [DISTWIDTH] IN BLOOD BY AUTOMATED COUNT: 14.1 % (ref 10–15)
FLUAV RNA SPEC QL NAA+PROBE: NEGATIVE
FLUBV RNA RESP QL NAA+PROBE: NEGATIVE
GFR SERPL CREATININE-BSD FRML MDRD: >90 ML/MIN/1.73M2
GLUCOSE BLD-MCNC: 107 MG/DL (ref 70–125)
GLUCOSE UR STRIP-MCNC: NEGATIVE MG/DL
HCG SERPL-ACNC: ABNORMAL MLU/ML (ref 0–4)
HCT VFR BLD AUTO: 37.3 % (ref 35–47)
HGB BLD-MCNC: 11.8 G/DL (ref 11.7–15.7)
HGB UR QL STRIP: NEGATIVE
IMM GRANULOCYTES # BLD: 0 10E3/UL
IMM GRANULOCYTES NFR BLD: 0 %
KETONES UR STRIP-MCNC: NEGATIVE MG/DL
LEUKOCYTE ESTERASE UR QL STRIP: NEGATIVE
LIPASE SERPL-CCNC: 63 U/L (ref 0–52)
LYMPHOCYTES # BLD AUTO: 2.1 10E3/UL (ref 0.8–5.3)
LYMPHOCYTES NFR BLD AUTO: 37 %
MAGNESIUM SERPL-MCNC: 2 MG/DL (ref 1.8–2.6)
MCH RBC QN AUTO: 27.4 PG (ref 26.5–33)
MCHC RBC AUTO-ENTMCNC: 31.6 G/DL (ref 31.5–36.5)
MCV RBC AUTO: 87 FL (ref 78–100)
MONOCYTES # BLD AUTO: 0.6 10E3/UL (ref 0–1.3)
MONOCYTES NFR BLD AUTO: 11 %
NEUTROPHILS # BLD AUTO: 2.8 10E3/UL (ref 1.6–8.3)
NEUTROPHILS NFR BLD AUTO: 50 %
NITRATE UR QL: NEGATIVE
NRBC # BLD AUTO: 0 10E3/UL
NRBC BLD AUTO-RTO: 0 /100
PH UR STRIP: 5 [PH] (ref 5–7)
PLATELET # BLD AUTO: 268 10E3/UL (ref 150–450)
POTASSIUM BLD-SCNC: 3.5 MMOL/L (ref 3.5–5)
PROT SERPL-MCNC: 8 G/DL (ref 6–8)
RBC # BLD AUTO: 4.31 10E6/UL (ref 3.8–5.2)
RBC URINE: 0 /HPF
SARS-COV-2 RNA RESP QL NAA+PROBE: NEGATIVE
SODIUM SERPL-SCNC: 137 MMOL/L (ref 136–145)
SP GR UR STRIP: 1.01 (ref 1–1.03)
SQUAMOUS EPITHELIAL: <1 /HPF
UROBILINOGEN UR STRIP-MCNC: <2 MG/DL
WBC # BLD AUTO: 5.8 10E3/UL (ref 4–11)
WBC URINE: 0 /HPF

## 2022-04-08 PROCEDURE — 85025 COMPLETE CBC W/AUTO DIFF WBC: CPT | Performed by: EMERGENCY MEDICINE

## 2022-04-08 PROCEDURE — 83690 ASSAY OF LIPASE: CPT | Performed by: EMERGENCY MEDICINE

## 2022-04-08 PROCEDURE — 84702 CHORIONIC GONADOTROPIN TEST: CPT | Performed by: EMERGENCY MEDICINE

## 2022-04-08 PROCEDURE — 83735 ASSAY OF MAGNESIUM: CPT | Performed by: EMERGENCY MEDICINE

## 2022-04-08 PROCEDURE — 96360 HYDRATION IV INFUSION INIT: CPT

## 2022-04-08 PROCEDURE — 99283 EMERGENCY DEPT VISIT LOW MDM: CPT | Mod: 25

## 2022-04-08 PROCEDURE — 250N000013 HC RX MED GY IP 250 OP 250 PS 637: Performed by: EMERGENCY MEDICINE

## 2022-04-08 PROCEDURE — C9803 HOPD COVID-19 SPEC COLLECT: HCPCS

## 2022-04-08 PROCEDURE — 36415 COLL VENOUS BLD VENIPUNCTURE: CPT | Performed by: EMERGENCY MEDICINE

## 2022-04-08 PROCEDURE — 87636 SARSCOV2 & INF A&B AMP PRB: CPT | Performed by: EMERGENCY MEDICINE

## 2022-04-08 PROCEDURE — 81001 URINALYSIS AUTO W/SCOPE: CPT | Performed by: EMERGENCY MEDICINE

## 2022-04-08 PROCEDURE — 80053 COMPREHEN METABOLIC PANEL: CPT | Performed by: EMERGENCY MEDICINE

## 2022-04-08 PROCEDURE — 258N000003 HC RX IP 258 OP 636: Performed by: EMERGENCY MEDICINE

## 2022-04-08 RX ORDER — PYRIDOXINE HCL (VITAMIN B6) 25 MG
25 TABLET ORAL DAILY
Status: DISCONTINUED | OUTPATIENT
Start: 2022-04-08 | End: 2022-04-09 | Stop reason: HOSPADM

## 2022-04-08 RX ORDER — PYRIDOXINE HCL (VITAMIN B6) 25 MG
25 TABLET ORAL 3 TIMES DAILY PRN
Qty: 21 TABLET | Refills: 0 | Status: SHIPPED | OUTPATIENT
Start: 2022-04-08 | End: 2022-04-09 | Stop reason: ALTCHOICE

## 2022-04-08 RX ORDER — SODIUM CHLORIDE 9 MG/ML
INJECTION, SOLUTION INTRAVENOUS CONTINUOUS
Status: DISCONTINUED | OUTPATIENT
Start: 2022-04-08 | End: 2022-04-09 | Stop reason: HOSPADM

## 2022-04-08 RX ADMIN — Medication 25 MG: at 23:13

## 2022-04-08 RX ADMIN — SODIUM CHLORIDE 1000 ML: 9 INJECTION, SOLUTION INTRAVENOUS at 22:53

## 2022-04-08 ASSESSMENT — ENCOUNTER SYMPTOMS
DYSURIA: 0
VOMITING: 1
ABDOMINAL PAIN: 1
DIARRHEA: 0
FEVER: 1
COUGH: 0
CHILLS: 1
NAUSEA: 1
SORE THROAT: 0
BLOOD IN STOOL: 0
APPETITE CHANGE: 1

## 2022-04-09 VITALS
TEMPERATURE: 99.1 F | HEART RATE: 85 BPM | RESPIRATION RATE: 18 BRPM | WEIGHT: 233 LBS | SYSTOLIC BLOOD PRESSURE: 135 MMHG | DIASTOLIC BLOOD PRESSURE: 83 MMHG | BODY MASS INDEX: 35.43 KG/M2 | OXYGEN SATURATION: 97 %

## 2022-04-09 RX ORDER — DOXYLAMINE SUCCINATE AND PYRIDOXINE HYDROCHLORIDE, DELAYED RELEASE TABLETS 10 MG/10 MG 10; 10 MG/1; MG/1
1 TABLET, DELAYED RELEASE ORAL
Qty: 15 TABLET | Refills: 0 | Status: SHIPPED | OUTPATIENT
Start: 2022-04-09 | End: 2022-04-23

## 2022-04-09 NOTE — ED PROVIDER NOTES
EMERGENCY DEPARTMENT ENCOUNTER      NAME: Brandee Nesbitt  AGE: 32 year old female  YOB: 1990  MRN: [id number]  EVALUATION DATE & TIME: 4/8/2022 10:15 PM    PCP: No Ref-Primary, Physician    ED PROVIDER: Samy Sarah MD    Chief Complaint   Patient presents with     Abdominal Pain     Fever     FINAL IMPRESSION:  1. Hyperemesis gravidarum        ED COURSE & MEDICAL DECISION MAKING:    Pertinent Labs & Imaging studies reviewed. (See chart for details)  32 year old female presents to the Emergency Department for evaluation of nausea vomiting general malaise reported fever at home in the setting of roughly 8 weeks gestation pregnancy.  Patient had a outpatient ultrasound through her obstetrician demonstrating an appropriate intrauterine pregnancy this was roughly a week ago.  She has had significant issues with repetitive nausea vomiting difficulty taking oral intake since discovery of her pregnancy.  She reports some mild abdominal pain that she attributes to stomach aching due to her lack of oral intake.  Describes the pain as in the upper mid abdomen and aching sensation.  She has no lower abdominal pain at this point no vaginal bleeding.  She reports constipation.  No blood in the stool.  Fever to 101 at home per her report.  No sore throat no cough no trouble breathing no urinary symptoms no other symptoms to suggest the source of her fever.  On clinical examination well-appearing adult male she was afebrile in the emergency department vital signs otherwise unremarkable.  She had minimal epigastric tenderness to palpation.  Otherwise unremarkable clinical examination.  I think the majority of patient's symptoms are secondary to hyperemesis in the setting of pregnancy.  I recommended intravenous fluids and instituting B6 to see if this helps alleviate some of her symptoms.  We are going to obtain Covid and influenza testing given her reported fever.  Awaiting urinalysis as well.  We will  see if her screening laboratory testing has any significant pathology identified.  Given her current clinical examination I did not feel that repeat ultrasound was indicated at this time.  There is an intrauterine pregnancy that is been demonstrated and she has no lower abdominal pain to palpation at this time.  We will continue to monitor in the emergency department we will initiate treatment plan.      12:27 AM  Patient felt significantly improved with vitamin B6 and fluids in the emergency department.  Lipase minimally elevated in the emergency department of doubtful clinical significance at this point on examination she has no discomfort to palpation.  Recommended follow-up.  Work-up otherwise unremarkable.  I did not feel imaging was indicated at this time based on information above.  Covid and influenza testing was negative.  No fever here in the emergency department.  Primarily I think patient symptoms are secondary to hyperemesis gravidarum.  Unclear source for the fever but likely viral.  Recommended discharge home with close monitoring.  I am going to provide a prescription for doxylamine pyridoxine for management of her nausea vomiting with instructions to see her OB/GYN doctor on Monday.  Reviewed return precautions prior to discharge.    10:20PM I met with the patient for the initial interview and physical examination. Discussed plan for treatment and workup in the ED. PPE: Provider wore surgical mask.     At the conclusion of the encounter I discussed the results of all of the tests and the disposition. The questions were answered. The patient or family acknowledged understanding and was agreeable with the care plan.     MEDICATIONS GIVEN IN THE EMERGENCY:  Medications   0.9% sodium chloride BOLUS (0 mLs Intravenous Stopped 4/9/22 0011)     Followed by   sodium chloride 0.9% infusion (has no administration in time range)   pyridOXINE (VITAMIN B6) tablet 25 mg (25 mg Oral Given 4/8/22 6104)       NEW  PRESCRIPTIONS STARTED AT TODAY'S ER VISIT  New Prescriptions    DOXYLAMINE-PYRIDOXINE 10-10 MG TBEC    Take 1 tablet by mouth nightly as needed (nausea vomiting)          =================================================================    HPI    Patient information was obtained from: patient     Use of : N/A         Brandee Nesbitt is a 32 year old female with a pertinent history of GERD, s/p cholecystectomy, asthma, and breast cancer s/p right mastectomy who presents to this ED for evaluation of abdominal pain.     Per chart review, patient had an OB US on 3/17/22. No intrauterine pregnancy was identified. Small fluid collection in the endometrial cavity with a mean diameter of 0.3 cm which would correspond to approximately 4w5d gestation if this was a gestational sac. Also a hypoechoic area in the right ovary which likely represents a corpus luteum cyst, less likely an ectopic pregnancy.     Patient is currently ~8 weeks pregnant based on US, had another US performed on 3/26/22 in clinic and was told that there was a single intrauterine pregnancy and it looked normal. However, reports 3 weeks of nausea and vomiting, 2 days of chills, and 1 day of upper abdominal pain and fever to 101. She has been trying to stay hydrated but has not been able to tolerate any fluids. Patient believes that her abdominal pain is because she has an empty stomach (secondary to vomiting) but is unsure. Denies lower abdominal pain or vaginal bleeding. Had nausea and vomiting with her other pregnancies as well, this pregnancy is better than the last.     Notes that she has a history of anemia and typically gets cold and chilled when her hemoglobin is low.     Otherwise denies sore throat, cough, dysuria, stool changes, or any other complaints at this time.      REVIEW OF SYSTEMS   Review of Systems   Constitutional: Positive for appetite change (decreased), chills and fever (101).   HENT: Negative for sore throat.     Respiratory: Negative for cough.    Gastrointestinal: Positive for abdominal pain (upper), nausea and vomiting. Negative for blood in stool and diarrhea.   Genitourinary: Negative for dysuria and vaginal bleeding.   All other systems reviewed and are negative.       PAST MEDICAL HISTORY:  Past Medical History:   Diagnosis Date     Breast cancer (H)      Gastroesophageal reflux disease      Mild intermittent asthma without complication      Obesity        PAST SURGICAL HISTORY:  Past Surgical History:   Procedure Laterality Date      SECTION       CHOLECYSTECTOMY       right mastectomy             CURRENT MEDICATIONS:    Doxylamine-Pyridoxine 10-10 MG TBEC  albuterol (PROAIR HFA/PROVENTIL HFA/VENTOLIN HFA) 108 (90 Base) MCG/ACT inhaler  Prenatal Vit-Fe Fumarate-FA (PRENATAL MULTIVITAMIN W/IRON) 27-0.8 MG tablet        ALLERGIES:  Allergies   Allergen Reactions     Diagnostic X-Ray Materials Shortness Of Breath, Other (See Comments) and Dizziness     Contrast dye-Shortness of breath  Pt became sleepy and confused. Needed to remind the pt to keep breathing, but denied any itching, swelling, or breathing difficulties.     Naproxen Shortness Of Breath     Phentermine Other (See Comments)     Palpitations, chest pain     Vancomycin Itching, Other (See Comments) and Rash     After few minutes, redness and itching noted in forearm. Symptoms diminished in few minutes after rate decreased by 1/2.    Patient has history of vancomycin infusion reaction. For further doses, vancomycin should be infused at a rate no higher than 10 mg/min or each gram over 100 minutes.  May also consider administering diphenhydramine and famotidine one hour before infusion.  Rash all over body itchy         FAMILY HISTORY:  Family History   Problem Relation Age of Onset     No Known Problems Mother      No Known Problems Father      No Known Problems Maternal Grandmother      No Known Problems Maternal Grandfather      No Known Problems  Paternal Grandmother      No Known Problems Paternal Grandfather      No Known Problems Brother      No Known Problems Sister      No Known Problems Sister      No Known Problems Brother        SOCIAL HISTORY:   Social History     Socioeconomic History     Marital status: Single     Spouse name: Not on file     Number of children: Not on file     Years of education: Not on file     Highest education level: Not on file   Occupational History     Not on file   Tobacco Use     Smoking status: Never Smoker     Smokeless tobacco: Never Used   Substance and Sexual Activity     Alcohol use: Not Currently     Comment: very occasional     Drug use: Never     Sexual activity: Not on file   Other Topics Concern     Not on file   Social History Narrative     Not on file     Social Determinants of Health     Financial Resource Strain: Not on file   Food Insecurity: Not on file   Transportation Needs: Not on file   Physical Activity: Not on file   Stress: Not on file   Social Connections: Not on file   Intimate Partner Violence: Not on file   Housing Stability: Not on file       VITALS:  /83   Pulse 85   Temp 99.2  F (37.3  C) (Oral)   Resp 18   Wt 105.7 kg (233 lb)   LMP 02/10/2022   SpO2 97%   BMI 35.43 kg/m      PHYSICAL EXAM    PHYSICAL EXAM    Constitutional: Well developed, Well nourished, NAD  HENT: Normocephalic, Atraumatic, Bilateral external ears normal, Oropharynx normal, mucous membranes moist, Nose normal. Neck-  Normal range of motion, No tenderness, Supple, No stridor.   Eyes: PERRL, EOMI, Conjunctiva normal, No discharge.   Respiratory: Normal breath sounds, No respiratory distress, No wheezing, Speaks full sentences easily. No cough.   Cardiovascular: Normal heart rate, Regular rhythm, No murmurs Chest wall nontender.    GI: Epigastric abdominal pain to palpation, minimal, no guarding no peritoneal signs, no other abdominal pain to palpation.  More specifically, no lower abdominal pain to deep  palpation.  : No cva tenderness    Musculoskeletal: 2+ DP pulses. No edema. No cyanosis. Good range of motion in all major joints. No tenderness to palpation. No tenderness of the CTLS spine.   Integument: Warm, Dry, No erythema, No rash. No petechiae.   Neurologic: Alert & oriented x 3, Normal motor function, Normal sensory function, No focal deficits noted.   Psychiatric: Affect normal, Judgment normal, Mood normal. Cooperative.     LAB:  All pertinent labs reviewed and interpreted.  Results for orders placed or performed during the hospital encounter of 04/08/22   Comprehensive metabolic panel   Result Value Ref Range    Sodium 137 136 - 145 mmol/L    Potassium 3.5 3.5 - 5.0 mmol/L    Chloride 105 98 - 107 mmol/L    Carbon Dioxide (CO2) 22 22 - 31 mmol/L    Anion Gap 10 5 - 18 mmol/L    Urea Nitrogen 8 8 - 22 mg/dL    Creatinine 0.75 0.60 - 1.10 mg/dL    Calcium 9.7 8.5 - 10.5 mg/dL    Glucose 107 70 - 125 mg/dL    Alkaline Phosphatase 75 45 - 120 U/L    AST 28 0 - 40 U/L    ALT 72 (H) 0 - 45 U/L    Protein Total 8.0 6.0 - 8.0 g/dL    Albumin 3.5 3.5 - 5.0 g/dL    Bilirubin Total 0.3 0.0 - 1.0 mg/dL    GFR Estimate >90 >60 mL/min/1.73m2   Result Value Ref Range    Lipase 63 (H) 0 - 52 U/L   Result Value Ref Range    Magnesium 2.0 1.8 - 2.6 mg/dL   UA with Microscopic reflex to Culture    Specimen: Urine, Midstream   Result Value Ref Range    Color Urine Light Yellow Colorless, Straw, Light Yellow, Yellow    Appearance Urine Clear Clear    Glucose Urine Negative Negative mg/dL    Bilirubin Urine Negative Negative    Ketones Urine Negative Negative mg/dL    Specific Gravity Urine 1.010 1.001 - 1.030    Blood Urine Negative Negative    pH Urine 5.0 5.0 - 7.0    Protein Albumin Urine Negative Negative mg/dL    Urobilinogen Urine <2.0 <2.0 mg/dL    Nitrite Urine Negative Negative    Leukocyte Esterase Urine Negative Negative    RBC Urine 0 <=2 /HPF    WBC Urine 0 <=5 /HPF    Squamous Epithelials Urine <1 <=1 /HPF    HCG quantitative pregnancy (blood)   Result Value Ref Range    hCG Quantitative 168,147 (H) 0 - 4 mlU/mL   Symptomatic; Unknown Influenza A/B & SARS-CoV2 (COVID-19) Virus PCR Multiplex Nasopharyngeal    Specimen: Nasopharyngeal; Swab   Result Value Ref Range    Influenza A PCR Negative Negative    Influenza B PCR Negative Negative    SARS CoV2 PCR Negative Negative   CBC with platelets and differential   Result Value Ref Range    WBC Count 5.8 4.0 - 11.0 10e3/uL    RBC Count 4.31 3.80 - 5.20 10e6/uL    Hemoglobin 11.8 11.7 - 15.7 g/dL    Hematocrit 37.3 35.0 - 47.0 %    MCV 87 78 - 100 fL    MCH 27.4 26.5 - 33.0 pg    MCHC 31.6 31.5 - 36.5 g/dL    RDW 14.1 10.0 - 15.0 %    Platelet Count 268 150 - 450 10e3/uL    % Neutrophils 50 %    % Lymphocytes 37 %    % Monocytes 11 %    % Eosinophils 2 %    % Basophils 0 %    % Immature Granulocytes 0 %    NRBCs per 100 WBC 0 <1 /100    Absolute Neutrophils 2.8 1.6 - 8.3 10e3/uL    Absolute Lymphocytes 2.1 0.8 - 5.3 10e3/uL    Absolute Monocytes 0.6 0.0 - 1.3 10e3/uL    Absolute Eosinophils 0.1 0.0 - 0.7 10e3/uL    Absolute Basophils 0.0 0.0 - 0.2 10e3/uL    Absolute Immature Granulocytes 0.0 <=0.4 10e3/uL    Absolute NRBCs 0.0 10e3/uL     I, Shannon Busby, am serving as a scribe to document services personally performed by Samy Sarah MD based on my observation and the provider's statements to me. I, Samy Sarah MD attest that Shannon Busby is acting in a scribe capacity, has observed my performance of the services and has documented them in accordance with my direction.    Samy Sarah MD  Emergency Medicine  [facility] EMERGENCY ROOM  [address] [address] [phone]     Samy Sarah MD  04/09/22 0029

## 2022-04-09 NOTE — DISCHARGE INSTRUCTIONS
Follow-up with your OB/GYN doctor on Monday.  Take medication as prescribed.  Rest and fluids.  If escalation to your symptoms or develop additional concern please return to the emergency department for repeat assessment.

## 2022-04-09 NOTE — ED NOTES
AVS reviewed with pt. Pt to follow up as recommended. All questions answered. Pt stated understanding. Pt left in stable condition.

## 2022-04-09 NOTE — ED TRIAGE NOTES
Pt presents to the ED with c/o weakness, abdominal pain, intermittent fevers, and no appetite since last night. Pt states she is 8 weeks pregnant. Pt last had tylenol roughly 2 hours ago.

## 2022-04-19 ENCOUNTER — PRENATAL OFFICE VISIT (OUTPATIENT)
Dept: OBGYN | Facility: CLINIC | Age: 32
End: 2022-04-19
Payer: COMMERCIAL

## 2022-04-19 DIAGNOSIS — Z34.80 PRENATAL CARE, SUBSEQUENT PREGNANCY: ICD-10-CM

## 2022-04-19 DIAGNOSIS — Z34.81 PRENATAL CARE, SUBSEQUENT PREGNANCY IN FIRST TRIMESTER: ICD-10-CM

## 2022-04-19 PROCEDURE — 99207 PR NO CHARGE NURSE ONLY: CPT

## 2022-04-19 PROCEDURE — 99207 PR NO CHARGE NURSE ONLY: CPT | Performed by: OBSTETRICS & GYNECOLOGY
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
## 2022-04-20 ENCOUNTER — HOSPITAL ENCOUNTER (EMERGENCY)
Facility: CLINIC | Age: 32
Discharge: HOME | End: 2022-04-21
Attending: EMERGENCY MEDICINE | Admitting: EMERGENCY MEDICINE
Payer: COMMERCIAL

## 2022-04-20 DIAGNOSIS — O21.0 HYPEREMESIS GRAVIDARUM: ICD-10-CM

## 2022-04-20 LAB
ALBUMIN SERPL-MCNC: 3.2 G/DL (ref 3.4–5)
ALP SERPL-CCNC: 97 U/L (ref 40–150)
ALT SERPL W P-5'-P-CCNC: 108 U/L (ref 0–50)
ANION GAP SERPL CALCULATED.3IONS-SCNC: 6 MMOL/L (ref 3–14)
AST SERPL W P-5'-P-CCNC: 47 U/L (ref 0–45)
BASOPHILS # BLD AUTO: 0 10E3/UL (ref 0–0.2)
BASOPHILS NFR BLD AUTO: 1 %
BILIRUB SERPL-MCNC: 0.3 MG/DL (ref 0.2–1.3)
BUN SERPL-MCNC: 12 MG/DL (ref 7–30)
CALCIUM SERPL-MCNC: 9.8 MG/DL (ref 8.5–10.1)
CHLORIDE BLD-SCNC: 107 MMOL/L (ref 94–109)
CO2 SERPL-SCNC: 26 MMOL/L (ref 20–32)
CREAT SERPL-MCNC: 0.66 MG/DL (ref 0.52–1.04)
EOSINOPHIL # BLD AUTO: 0.1 10E3/UL (ref 0–0.7)
EOSINOPHIL NFR BLD AUTO: 2 %
ERYTHROCYTE [DISTWIDTH] IN BLOOD BY AUTOMATED COUNT: 14.3 % (ref 10–15)
GFR SERPL CREATININE-BSD FRML MDRD: >90 ML/MIN/1.73M2
GLUCOSE BLD-MCNC: 99 MG/DL (ref 70–99)
HCT VFR BLD AUTO: 38.8 % (ref 35–47)
HGB BLD-MCNC: 12.3 G/DL (ref 11.7–15.7)
HOLD SPECIMEN: NORMAL
IMM GRANULOCYTES # BLD: 0 10E3/UL
IMM GRANULOCYTES NFR BLD: 0 %
LIPASE SERPL-CCNC: 130 U/L (ref 73–393)
LYMPHOCYTES # BLD AUTO: 2.1 10E3/UL (ref 0.8–5.3)
LYMPHOCYTES NFR BLD AUTO: 33 %
MCH RBC QN AUTO: 28 PG (ref 26.5–33)
MCHC RBC AUTO-ENTMCNC: 31.7 G/DL (ref 31.5–36.5)
MCV RBC AUTO: 88 FL (ref 78–100)
MONOCYTES # BLD AUTO: 0.7 10E3/UL (ref 0–1.3)
MONOCYTES NFR BLD AUTO: 10 %
NEUTROPHILS # BLD AUTO: 3.5 10E3/UL (ref 1.6–8.3)
NEUTROPHILS NFR BLD AUTO: 54 %
NRBC # BLD AUTO: 0 10E3/UL
NRBC BLD AUTO-RTO: 0 /100
PLATELET # BLD AUTO: 291 10E3/UL (ref 150–450)
POTASSIUM BLD-SCNC: 3.6 MMOL/L (ref 3.4–5.3)
PROT SERPL-MCNC: 8.3 G/DL (ref 6.8–8.8)
RBC # BLD AUTO: 4.4 10E6/UL (ref 3.8–5.2)
SODIUM SERPL-SCNC: 139 MMOL/L (ref 133–144)
WBC # BLD AUTO: 6.5 10E3/UL (ref 4–11)

## 2022-04-20 PROCEDURE — 99284 EMERGENCY DEPT VISIT MOD MDM: CPT | Mod: 25 | Performed by: EMERGENCY MEDICINE

## 2022-04-20 PROCEDURE — 96374 THER/PROPH/DIAG INJ IV PUSH: CPT | Performed by: EMERGENCY MEDICINE

## 2022-04-20 PROCEDURE — 80053 COMPREHEN METABOLIC PANEL: CPT | Performed by: EMERGENCY MEDICINE

## 2022-04-20 PROCEDURE — 83690 ASSAY OF LIPASE: CPT | Performed by: EMERGENCY MEDICINE

## 2022-04-20 PROCEDURE — 36415 COLL VENOUS BLD VENIPUNCTURE: CPT | Performed by: EMERGENCY MEDICINE

## 2022-04-20 PROCEDURE — 85025 COMPLETE CBC W/AUTO DIFF WBC: CPT | Performed by: EMERGENCY MEDICINE

## 2022-04-20 PROCEDURE — 258N000003 HC RX IP 258 OP 636: Performed by: EMERGENCY MEDICINE

## 2022-04-20 PROCEDURE — 250N000011 HC RX IP 250 OP 636: Performed by: EMERGENCY MEDICINE

## 2022-04-20 PROCEDURE — 96361 HYDRATE IV INFUSION ADD-ON: CPT | Performed by: EMERGENCY MEDICINE

## 2022-04-20 PROCEDURE — 99284 EMERGENCY DEPT VISIT MOD MDM: CPT | Performed by: EMERGENCY MEDICINE

## 2022-04-20 RX ORDER — ONDANSETRON 2 MG/ML
4 INJECTION INTRAMUSCULAR; INTRAVENOUS ONCE
Status: COMPLETED | OUTPATIENT
Start: 2022-04-20 | End: 2022-04-20

## 2022-04-20 RX ORDER — ONDANSETRON 4 MG/1
8 TABLET, ORALLY DISINTEGRATING ORAL EVERY 8 HOURS PRN
Qty: 10 TABLET | Refills: 0 | Status: SHIPPED | OUTPATIENT
Start: 2022-04-20 | End: 2023-01-20

## 2022-04-20 RX ADMIN — ONDANSETRON 4 MG: 2 INJECTION INTRAMUSCULAR; INTRAVENOUS at 22:39

## 2022-04-20 RX ADMIN — SODIUM CHLORIDE 1000 ML: 9 INJECTION, SOLUTION INTRAVENOUS at 22:39

## 2022-04-21 VITALS
BODY MASS INDEX: 34.86 KG/M2 | DIASTOLIC BLOOD PRESSURE: 62 MMHG | TEMPERATURE: 98.6 F | HEIGHT: 68 IN | HEART RATE: 76 BPM | OXYGEN SATURATION: 95 % | WEIGHT: 230 LBS | RESPIRATION RATE: 16 BRPM | SYSTOLIC BLOOD PRESSURE: 100 MMHG

## 2022-04-21 NOTE — ED PROVIDER NOTES
History     Chief Complaint   Patient presents with     Hyperemesis     Pt 9 weeks pregnant     HPI  Brandee Nesbitt is a 32 year old female with a past medical history significant for breast cancer GERD mild asthma anemia obesity and is currently pregnant at 9 weeks.  Patient presents complaining of vomiting and unable to keep food down.  Patient states she has been having nausea since her pregnancy and feels dehydrated.  She feels hungry but cannot eat.  She denies any fevers or chills has not any headache or visual changes.  She denies any neck pain she has not had chest pain or back pain.  She denies abdominal pain has not had focal numbness weakness in extremity denies bowel or bladder dysfunction.  She has not had any vaginal discharge or bleeding.  Denies any abdominal cramping.    Allergies:  Allergies   Allergen Reactions     Diagnostic X-Ray Materials Shortness Of Breath, Other (See Comments) and Dizziness     Contrast dye-Shortness of breath  Pt became sleepy and confused. Needed to remind the pt to keep breathing, but denied any itching, swelling, or breathing difficulties.     Naproxen Shortness Of Breath     Phentermine Other (See Comments)     Palpitations, chest pain     Vancomycin Itching, Other (See Comments) and Rash     After few minutes, redness and itching noted in forearm. Symptoms diminished in few minutes after rate decreased by 1/2.    Patient has history of vancomycin infusion reaction. For further doses, vancomycin should be infused at a rate no higher than 10 mg/min or each gram over 100 minutes.  May also consider administering diphenhydramine and famotidine one hour before infusion.  Rash all over body itchy         Problem List:    Patient Active Problem List    Diagnosis Date Noted     Prenatal care, subsequent pregnancy 04/19/2022     Priority: Medium     FOB- Akil Son       Malignant neoplasm of breast (H) 09/29/2021     Priority: Medium     Red blood cell antibody  positive 2021     Priority: Medium     Formatting of this note might be different from the original.  Anti-M (COLD) identified on 21 LAB BLOOD BANK [facility]  Formatting of this note might be different from the original.  Formatting of this note might be different from the original.  Anti-M (COLD) identified on 21 LAB BLOOD BANK [facility]       History of right mastectomy 2021     Priority: Medium     Ductal carcinoma in situ (DCIS) of right breast 2021     Priority: Medium     Estrogen receptor positive status (ER+) 2021     Priority: Medium     Macromastia 2021     Priority: Medium     Chest pain 10/04/2020     Priority: Medium     Gastroesophageal reflux disease without esophagitis 10/04/2020     Priority: Medium     Lung nodule 10/04/2020     Priority: Medium     Headaches due to old head trauma 01/15/2020     Priority: Medium     Injury of left shoulder 01/15/2020     Priority: Medium     Mild intermittent asthma without complication 2017     Priority: Medium        Past Medical History:    Past Medical History:   Diagnosis Date     Anemia      Anti-mi-2 antibody present      Breast cancer (H)      Gastroesophageal reflux disease      Mild intermittent asthma without complication      Obesity        Past Surgical History:    Past Surgical History:   Procedure Laterality Date     AS REDUCTION OF LARGE BREAST  2021      SECTION       CHOLECYSTECTOMY       right mastectomy         Family History:    Family History   Problem Relation Age of Onset     No Known Problems Mother      Other - See Comments Father         murder at age 42     No Known Problems Sister      No Known Problems Sister      No Known Problems Brother      No Known Problems Brother      No Known Problems Maternal Grandmother      No Known Problems Maternal Grandfather      No Known Problems Paternal Grandmother      No Known Problems Paternal Grandfather        Social History:  Marital Status:   "Single [1]  Social History     Tobacco Use     Smoking status: Never Smoker     Smokeless tobacco: Never Used   Substance Use Topics     Alcohol use: Not Currently     Comment: very occasional-quit with pregnancy     Drug use: Never        Medications:    albuterol (PROAIR HFA/PROVENTIL HFA/VENTOLIN HFA) 108 (90 Base) MCG/ACT inhaler  Doxylamine-Pyridoxine 10-10 MG TBEC  Prenatal Vit-Fe Fumarate-FA (PRENATAL MULTIVITAMIN W/IRON) 27-0.8 MG tablet          Review of Systems  All systems reviewed and other than pertinent positives negatives in HPI all other systems are negative.  Physical Exam   BP: 117/82  Pulse: 100  Temp: 98.6  F (37  C)  Resp: 16  Height: 172.7 cm (5' 8\")  Weight: 104.3 kg (230 lb) (stated)  SpO2: 100 %      Physical Exam  Vitals and nursing note reviewed.   Constitutional:       Appearance: Normal appearance. She is not ill-appearing, toxic-appearing or diaphoretic.      Comments: Mild generalized distress   HENT:      Head: Normocephalic and atraumatic.      Nose: Nose normal.   Eyes:      Conjunctiva/sclera: Conjunctivae normal.   Cardiovascular:      Rate and Rhythm: Normal rate and regular rhythm.      Pulses: Normal pulses.      Heart sounds: Normal heart sounds. No murmur heard.  Pulmonary:      Effort: Pulmonary effort is normal.      Breath sounds: Normal breath sounds. No wheezing or rhonchi.   Abdominal:      General: Abdomen is flat. There is no distension.      Palpations: Abdomen is soft.      Tenderness: There is no abdominal tenderness. There is no right CVA tenderness or left CVA tenderness.      Comments: Bowel sounds slightly hyperactive.   Musculoskeletal:         General: No tenderness. Normal range of motion.      Cervical back: Normal range of motion and neck supple. No tenderness.      Right lower leg: No edema.      Left lower leg: No edema.   Skin:     General: Skin is warm and dry.      Capillary Refill: Capillary refill takes less than 2 seconds.   Neurological:      " General: No focal deficit present.      Mental Status: She is alert and oriented to person, place, and time.      Sensory: No sensory deficit.      Motor: No weakness.      Coordination: Coordination normal.   Psychiatric:         Mood and Affect: Mood normal.         ED Course                 Procedures              Critical Care time:  none               Results for orders placed or performed during the hospital encounter of 04/20/22 (from the past 24 hour(s))   [facility] [facility]    Narrative    The following orders were created for panel order [facility] [facility].  Procedure                               Abnormality         Status                     ---------                               -----------         ------                     Extra Blue Top Tube[[id number]]                                                         Extra Red Top Tube[[id number]]                               In process                 Extra Green Top (Lithium...[[id number]]                      In process                 Extra Purple Top Tube[[id number]]                            In process                   Please view results for these tests on the individual orders.       Medications   0.9% sodium chloride BOLUS (has no administration in time range)   ondansetron (ZOFRAN) injection 4 mg (has no administration in time range)       Assessments & Plan (with Medical Decision Making) records were reviewed.  Patient is given IV fluids and Zosyn labs were obtained.  White count was 6.5 hemoglobin 12.3 platelet count 291.  Comprehensive metabolic panel significant for slightly elevated AST and ALT.  Patient was within normal limits.  She was observed for some time.  On reexamination patient is feeling better after the IV fluids and nausea medication she is not nauseated anymore and feels comfortable going home.  Vital signs are stable and she has no abdominal tenderness on reexamination.  Patient will be given Zofran and should return if symptoms worsen or  new symptoms develop.  Patient is agreement this plan.     I have reviewed the nursing notes.    I have reviewed the findings, diagnosis, plan and need for follow up with the patient.       Discharge Medication List as of 4/20/2022 11:53 PM      START taking these medications    Details   ondansetron (ZOFRAN-ODT) 4 MG ODT tab Take 2 tablets (8 mg) by mouth every 8 hours as needed for nausea, Disp-10 tablet, R-0, [facility]             Final diagnoses:   Hyperemesis gravidarum       4/20/2022   [facility] EMERGENCY DEPT     Jarvis, Cecil Marie MD  04/22/22 4401

## 2022-04-21 NOTE — DISCHARGE INSTRUCTIONS
Return if symptoms worsen or new symptoms develop.  Take Zofran as needed for nausea and vomiting.  Follow-up with your OB/GYN next available.  Gradually advance diet and fluids.  If continued vomiting any abdominal pain fevers, vaginal bleeding discharge weakness or other symptoms present please return for further evaluation and care.

## 2022-04-21 NOTE — ED NOTES
"Pt states she has been having N/V since yesterday. Pt states she has been unable \"to keep anything down\". Pt VSS. Pt denies pain. Pt placed on monitors. IV placed and labs drawn. Awaiting provider and further orders.   "

## 2022-04-28 ENCOUNTER — LAB (OUTPATIENT)
Dept: LAB | Facility: CLINIC | Age: 32
End: 2022-04-28

## 2022-04-28 ENCOUNTER — HOSPITAL ENCOUNTER (EMERGENCY)
Facility: CLINIC | Age: 32
Discharge: HOME | End: 2022-04-28
Attending: NURSE PRACTITIONER | Admitting: NURSE PRACTITIONER
Payer: COMMERCIAL

## 2022-04-28 ENCOUNTER — PRENATAL OFFICE VISIT (OUTPATIENT)
Dept: OBGYN | Facility: CLINIC | Age: 32
End: 2022-04-28
Payer: COMMERCIAL

## 2022-04-28 VITALS
SYSTOLIC BLOOD PRESSURE: 102 MMHG | WEIGHT: 224 LBS | BODY MASS INDEX: 33.95 KG/M2 | TEMPERATURE: 98.8 F | HEIGHT: 68 IN | HEART RATE: 114 BPM | DIASTOLIC BLOOD PRESSURE: 68 MMHG

## 2022-04-28 VITALS
BODY MASS INDEX: 34.06 KG/M2 | RESPIRATION RATE: 16 BRPM | TEMPERATURE: 98.7 F | SYSTOLIC BLOOD PRESSURE: 101 MMHG | WEIGHT: 224 LBS | DIASTOLIC BLOOD PRESSURE: 66 MMHG | HEART RATE: 99 BPM | OXYGEN SATURATION: 97 %

## 2022-04-28 DIAGNOSIS — O21.0 HYPEREMESIS GRAVIDARUM: ICD-10-CM

## 2022-04-28 DIAGNOSIS — Z34.81 PRENATAL CARE, SUBSEQUENT PREGNANCY IN FIRST TRIMESTER: Primary | ICD-10-CM

## 2022-04-28 DIAGNOSIS — D05.11 DUCTAL CARCINOMA IN SITU (DCIS) OF RIGHT BREAST: ICD-10-CM

## 2022-04-28 DIAGNOSIS — Z34.81 PRENATAL CARE, SUBSEQUENT PREGNANCY IN FIRST TRIMESTER: ICD-10-CM

## 2022-04-28 DIAGNOSIS — R76.8 RED BLOOD CELL ANTIBODY POSITIVE: ICD-10-CM

## 2022-04-28 PROBLEM — C50.919 MALIGNANT NEOPLASM OF BREAST (H): Status: RESOLVED | Noted: 2021-09-29 | Resolved: 2022-04-28

## 2022-04-28 LAB
ABO/RH(D): ABNORMAL
ALBUMIN UR-MCNC: NEGATIVE MG/DL
ANION GAP SERPL CALCULATED.3IONS-SCNC: 7 MMOL/L (ref 3–14)
ANTIBODY SCREEN: POSITIVE
APPEARANCE UR: CLEAR
BILIRUB UR QL STRIP: NEGATIVE
BUN SERPL-MCNC: 12 MG/DL (ref 7–30)
CALCIUM SERPL-MCNC: 9.6 MG/DL (ref 8.5–10.1)
CHLORIDE BLD-SCNC: 104 MMOL/L (ref 94–109)
CO2 SERPL-SCNC: 24 MMOL/L (ref 20–32)
COLOR UR AUTO: YELLOW
CREAT SERPL-MCNC: 0.62 MG/DL (ref 0.52–1.04)
ERYTHROCYTE [DISTWIDTH] IN BLOOD BY AUTOMATED COUNT: 14.1 % (ref 10–15)
GFR SERPL CREATININE-BSD FRML MDRD: >90 ML/MIN/1.73M2
GLUCOSE BLD-MCNC: 153 MG/DL (ref 70–99)
GLUCOSE UR STRIP-MCNC: NEGATIVE MG/DL
HCT VFR BLD AUTO: 36.4 % (ref 35–47)
HGB BLD-MCNC: 11.7 G/DL (ref 11.7–15.7)
HGB UR QL STRIP: NEGATIVE
HOLD SPECIMEN: NORMAL
HOLD SPECIMEN: NORMAL
KETONES UR STRIP-MCNC: NEGATIVE MG/DL
LEUKOCYTE ESTERASE UR QL STRIP: NEGATIVE
MCH RBC QN AUTO: 28 PG (ref 26.5–33)
MCHC RBC AUTO-ENTMCNC: 32.1 G/DL (ref 31.5–36.5)
MCV RBC AUTO: 87 FL (ref 78–100)
NITRATE UR QL: NEGATIVE
PH UR STRIP: 6 [PH] (ref 5–7)
PLATELET # BLD AUTO: 245 10E3/UL (ref 150–450)
POTASSIUM BLD-SCNC: 3.4 MMOL/L (ref 3.4–5.3)
RBC # BLD AUTO: 4.18 10E6/UL (ref 3.8–5.2)
RBC #/AREA URNS AUTO: ABNORMAL /HPF
SODIUM SERPL-SCNC: 135 MMOL/L (ref 133–144)
SP GR UR STRIP: >=1.03 (ref 1–1.03)
SPECIMEN EXPIRATION DATE: ABNORMAL
SQUAMOUS #/AREA URNS AUTO: ABNORMAL /LPF
UROBILINOGEN UR STRIP-ACNC: 0.2 E.U./DL
WBC # BLD AUTO: 5.1 10E3/UL (ref 4–11)
WBC #/AREA URNS AUTO: ABNORMAL /HPF

## 2022-04-28 PROCEDURE — 76817 TRANSVAGINAL US OBSTETRIC: CPT | Performed by: OBSTETRICS & GYNECOLOGY

## 2022-04-28 PROCEDURE — 99207 PR FIRST OB VISIT: CPT | Performed by: OBSTETRICS & GYNECOLOGY

## 2022-04-28 PROCEDURE — 99284 EMERGENCY DEPT VISIT MOD MDM: CPT | Mod: 25 | Performed by: NURSE PRACTITIONER

## 2022-04-28 PROCEDURE — 99284 EMERGENCY DEPT VISIT MOD MDM: CPT | Performed by: NURSE PRACTITIONER

## 2022-04-28 PROCEDURE — 87086 URINE CULTURE/COLONY COUNT: CPT | Performed by: OBSTETRICS & GYNECOLOGY

## 2022-04-28 PROCEDURE — 82310 ASSAY OF CALCIUM: CPT | Performed by: NURSE PRACTITIONER

## 2022-04-28 PROCEDURE — 36415 COLL VENOUS BLD VENIPUNCTURE: CPT | Performed by: NURSE PRACTITIONER

## 2022-04-28 PROCEDURE — 87389 HIV-1 AG W/HIV-1&-2 AB AG IA: CPT

## 2022-04-28 PROCEDURE — 36415 COLL VENOUS BLD VENIPUNCTURE: CPT

## 2022-04-28 PROCEDURE — 81001 URINALYSIS AUTO W/SCOPE: CPT | Performed by: OBSTETRICS & GYNECOLOGY

## 2022-04-28 PROCEDURE — 85027 COMPLETE CBC AUTOMATED: CPT

## 2022-04-28 PROCEDURE — 258N000003 HC RX IP 258 OP 636: Performed by: NURSE PRACTITIONER

## 2022-04-28 PROCEDURE — 86762 RUBELLA ANTIBODY: CPT

## 2022-04-28 PROCEDURE — 96361 HYDRATE IV INFUSION ADD-ON: CPT | Performed by: NURSE PRACTITIONER

## 2022-04-28 PROCEDURE — 86901 BLOOD TYPING SEROLOGIC RH(D): CPT

## 2022-04-28 PROCEDURE — 96374 THER/PROPH/DIAG INJ IV PUSH: CPT | Performed by: NURSE PRACTITIONER

## 2022-04-28 PROCEDURE — 250N000011 HC RX IP 250 OP 636: Performed by: NURSE PRACTITIONER

## 2022-04-28 PROCEDURE — 87340 HEPATITIS B SURFACE AG IA: CPT

## 2022-04-28 PROCEDURE — 86780 TREPONEMA PALLIDUM: CPT

## 2022-04-28 PROCEDURE — 86900 BLOOD TYPING SEROLOGIC ABO: CPT

## 2022-04-28 PROCEDURE — 86850 RBC ANTIBODY SCREEN: CPT

## 2022-04-28 PROCEDURE — 86803 HEPATITIS C AB TEST: CPT

## 2022-04-28 RX ORDER — ONDANSETRON 8 MG/1
8 TABLET, ORALLY DISINTEGRATING ORAL EVERY 8 HOURS PRN
Qty: 20 TABLET | Refills: 4 | Status: SHIPPED | OUTPATIENT
Start: 2022-04-28 | End: 2022-05-23

## 2022-04-28 RX ORDER — ONDANSETRON 2 MG/ML
4 INJECTION INTRAMUSCULAR; INTRAVENOUS
Status: COMPLETED | OUTPATIENT
Start: 2022-04-28 | End: 2022-04-28

## 2022-04-28 RX ADMIN — SODIUM CHLORIDE 1000 ML: 9 INJECTION, SOLUTION INTRAVENOUS at 12:03

## 2022-04-28 RX ADMIN — ONDANSETRON 4 MG: 2 INJECTION INTRAMUSCULAR; INTRAVENOUS at 12:02

## 2022-04-28 ASSESSMENT — ENCOUNTER SYMPTOMS
LIGHT-HEADEDNESS: 1
WEAKNESS: 1
VOMITING: 1
NAUSEA: 1

## 2022-04-28 NOTE — NURSING NOTE
"Initial /68 (BP Location: Right arm, Patient Position: Chair, Cuff Size: Adult Large)   Pulse 114   Temp 98.8  F (37.1  C) (Tympanic)   Ht 1.727 m (5' 8\")   Wt 101.6 kg (224 lb)   LMP 02/10/2022   Breastfeeding No   BMI 34.06 kg/m   Estimated body mass index is 34.06 kg/m  as calculated from the following:    Height as of this encounter: 1.727 m (5' 8\").    Weight as of this encounter: 101.6 kg (224 lb). .        "

## 2022-04-28 NOTE — ED PROVIDER NOTES
"  History     Chief Complaint   Patient presents with     Hyperemesis     HPI  Brandee Nesbitt is a 32 year old female who presents to the emergency department for evaluation of hyperemesis gravidarum.  Patient was at OB visit and advised to present to the emergency department for IV hydration.  Patient reports she \"cannot keep anything down\".  Patient is approximately 10 weeks gestation, this is her third pregnancy and she did not experience hyperemesis with her other 2.  Accompanying lightheadedness and weakness.  No headache, fever, dizziness, chest pain, abdominal pain, abdominal cramping, vaginal bleeding, vaginal discharge, dysuria and hematuria.  Has prescription for Zofran that she has not been using.  Emesis nonbloody nonbilious.    Allergies:  Allergies   Allergen Reactions     Diagnostic X-Ray Materials Shortness Of Breath, Other (See Comments) and Dizziness     Contrast dye-Shortness of breath  Pt became sleepy and confused. Needed to remind the pt to keep breathing, but denied any itching, swelling, or breathing difficulties.     Naproxen Shortness Of Breath     Phentermine Other (See Comments)     Palpitations, chest pain     Vancomycin Itching, Other (See Comments) and Rash     After few minutes, redness and itching noted in forearm. Symptoms diminished in few minutes after rate decreased by 1/2.    Patient has history of vancomycin infusion reaction. For further doses, vancomycin should be infused at a rate no higher than 10 mg/min or each gram over 100 minutes.  May also consider administering diphenhydramine and famotidine one hour before infusion.  Rash all over body itchy         Problem List:    Patient Active Problem List    Diagnosis Date Noted     Prenatal care, subsequent pregnancy 04/19/2022     Priority: Medium     FOB- Akil Son       Red blood cell antibody positive 06/29/2021     Priority: Medium     Anti-M (COLD) identified on 6/25/21  Not clinically significant         " History of right mastectomy 2021     Priority: Medium     Ductal carcinoma in situ (DCIS) of right breast 2021     Priority: Medium     Dx at time of breast reduction  S/p simple right mastectomy   Followed at [facility]       Estrogen receptor positive status (ER+) 2021     Priority: Medium     Macromastia 2021     Priority: Medium     Chest pain 10/04/2020     Priority: Medium     Gastroesophageal reflux disease without esophagitis 10/04/2020     Priority: Medium     Lung nodule 10/04/2020     Priority: Medium     Headaches due to old head trauma 01/15/2020     Priority: Medium     Injury of left shoulder 01/15/2020     Priority: Medium     Mild intermittent asthma without complication 2017     Priority: Medium        Past Medical History:    Past Medical History:   Diagnosis Date     Anemia      Anti-mi-2 antibody present      Breast cancer (H)      Gastroesophageal reflux disease      Mild intermittent asthma without complication      Obesity        Past Surgical History:    Past Surgical History:   Procedure Laterality Date     AS REDUCTION OF LARGE BREAST  2021      SECTION       CHOLECYSTECTOMY       right mastectomy  2021       Family History:    Family History   Problem Relation Age of Onset     No Known Problems Mother      Other - See Comments Father         murder at age 42     No Known Problems Sister      No Known Problems Sister      No Known Problems Brother      No Known Problems Brother      No Known Problems Maternal Grandmother      No Known Problems Maternal Grandfather      No Known Problems Paternal Grandmother      No Known Problems Paternal Grandfather        Social History:  Marital Status:  Single [1]  Social History     Tobacco Use     Smoking status: Never Smoker     Smokeless tobacco: Never Used   Vaping Use     Vaping Use: Never used   Substance Use Topics     Alcohol use: Not Currently     Comment: very occasional-quit with pregnancy      Drug use: Never        Medications:    albuterol (PROAIR HFA/PROVENTIL HFA/VENTOLIN HFA) 108 (90 Base) MCG/ACT inhaler  ondansetron (ZOFRAN-ODT) 4 MG ODT tab  ondansetron (ZOFRAN-ODT) 8 MG ODT tab  Prenatal Vit-Fe Fumarate-FA (PRENATAL MULTIVITAMIN W/IRON) 27-0.8 MG tablet          Review of Systems   Gastrointestinal: Positive for nausea and vomiting.   Neurological: Positive for weakness and light-headedness.   All other systems reviewed and are negative.      Physical Exam   BP: 101/66  Pulse: 99  Temp: 98.7  F (37.1  C)  Resp: 16  Weight: 101.6 kg (224 lb)  SpO2: 97 %      Physical Exam  Constitutional:       General: She is not in acute distress.     Appearance: Normal appearance.   Cardiovascular:      Rate and Rhythm: Normal rate.   Pulmonary:      Effort: Pulmonary effort is normal.   Abdominal:      General: There is no distension.      Palpations: Abdomen is soft.      Tenderness: There is no abdominal tenderness.   Musculoskeletal:         General: Normal range of motion.   Skin:     General: Skin is warm.      Capillary Refill: Capillary refill takes less than 2 seconds.   Neurological:      General: No focal deficit present.      Mental Status: She is alert.         ED Course                 Procedures      Results for orders placed or performed during the hospital encounter of 04/28/22 (from the past 24 hour(s))   Basic metabolic panel   Result Value Ref Range    Sodium 135 133 - 144 mmol/L    Potassium 3.4 3.4 - 5.3 mmol/L    Chloride 104 94 - 109 mmol/L    Carbon Dioxide (CO2) 24 20 - 32 mmol/L    Anion Gap 7 3 - 14 mmol/L    Urea Nitrogen 12 7 - 30 mg/dL    Creatinine 0.62 0.52 - 1.04 mg/dL    Calcium 9.6 8.5 - 10.1 mg/dL    Glucose 153 (H) 70 - 99 mg/dL    GFR Estimate >90 >60 mL/min/1.73m2   [place] Draw    Narrative    The following orders were created for panel order [place] Draw.  Procedure                               Abnormality         Status                     ---------                 "               -----------         ------                     Extra Red Top Tube[[id number]]                               Final result               Extra Purple Top Tube[[id number]]                            Final result                 Please view results for these tests on the individual orders.   Extra Red Top Tube   Result Value Ref Range    Hold Specimen JIC    Extra Purple Top Tube   Result Value Ref Range    Hold Specimen JIC        Medications   0.9% sodium chloride BOLUS (0 mLs Intravenous Stopped 4/28/22 1331)   ondansetron (ZOFRAN) injection 4 mg (4 mg Intravenous Given 4/28/22 1202)       Assessments & Plan (with Medical Decision Making)   Brandee Nesbitt is a 32 year old female who presents to the emergency department for evaluation of hyperemesis gravidarum.  Patient was at OB visit and advised to present to the emergency department for IV hydration.  Patient reports she \"cannot keep anything down\".  Patient is approximately 10 weeks gestation, this is her third pregnancy and she did not experience hyperemesis with her other 2.  Accompanying lightheadedness and weakness.  Vitals normal.  No worrisome findings on physical exam.  IV hydration provided and patient reports feeling better.  Tolerated food here in the department.  May follow-up with OB/GYN for outpatient infusion therapy as needed.  Discharged from the department in good condition.    I have reviewed the nursing notes.    I have reviewed the findings, diagnosis, plan and need for follow up with the patient.    Discharge Medication List as of 4/28/2022  1:41 PM          Final diagnoses:   Hyperemesis gravidarum       4/28/2022   [facility] EMERGENCY DEPT     Divina Blanton, MALINI CNP  04/28/22 1406    "

## 2022-04-28 NOTE — ED TRIAGE NOTES
Sent from OB, pt has hyperemesis.  Advised to come to ED for fluids.      Triage Assessment     Row Name 04/28/22 1125       Triage Assessment (Adult)    Airway WDL WDL       Respiratory WDL    Respiratory WDL WDL       Skin Circulation/Temperature WDL    Skin Circulation/Temperature WDL WDL       Cardiac WDL    Cardiac WDL WDL       Peripheral/Neurovascular WDL    Peripheral Neurovascular WDL WDL       Cognitive/Neuro/Behavioral WDL    Cognitive/Neuro/Behavioral WDL WDL

## 2022-04-28 NOTE — ED NOTES
Pt had multiple lab tests ordered from OB provider, pt would like blood drawn while in ED. Verified orders with lab, blood obtained from IV site and sent. Pt states nausea better, asking for crackers, given along with ice chips.

## 2022-04-28 NOTE — PROGRESS NOTES
Discussed physician coverage, tertiary support, diet, exercise, weight gain, schedule of visits, routine and indicated ultrasounds, childbirth education and antepartum testing for certain birth defects.  Encouraged patient to review contents of Prenatal Breastfeeding Education Toolkit. Offered opportunity to answer questions regarding the importance of skin to skin contact, early initiation of exclusive breastfeeding for the first six months and rooming in while in the hospital.    Syphilis is a sexually transmitted disease that can cause birth defects in the babies of untreated mothers. Every pregnant patient is tested for syphilis early in each pregnancy as part of the routine lab work. The [place] Department of [facility] has seen an increase in the rate of syphilis in [place]. The [facility] now recommends testing for syphilis 2 times during a pregnancy, the new prenatal visit, and 28 weeks or when admitted for delivery. Patient accepts lab testing for syphilis.    Group call support for deliveries was discussed, as well as tertiary support in event of high risk issues within [facility].    Discussed current state of COVID-19 in pregnancy, when to seek out emergency care, how to self care at home with milder symptoms.  Discussed COVID vaccine, latest safety information, potential benefits in pregnancy to mom and baby (lower risk for hospitalization and death)        Options for  testing for birth defects were discussed with the patient, generally including CVS testing, Quad screen serum testing, nuchal lucency/blood marker testing, genetic amniocentesis, NIPT testing  and/or level 2 ultrasound.    Current issues include: severe nausea/emesis, can't keep anything down; feels week  H/o DCIS right breast--follows at [facility]    Past medical, surgical, social and family histories reviewed on OB questionaire and included on episode summary.  Pertinent review of systems items stated  "above. See OB questionaire for pertinent components of HPI.    Past Medical History:   Diagnosis Date     Anemia      Anti-mi-2 antibody present     anti-M (cold) red blood cell antibody     Breast cancer (H)      Gastroesophageal reflux disease      Mild intermittent asthma without complication      Obesity      See epic chart    Past Surgical History:   Procedure Laterality Date     AS REDUCTION OF LARGE BREAST  2021      SECTION       CHOLECYSTECTOMY       right mastectomy  2021     See epic chart    Patient Active Problem List    Diagnosis Date Noted     Prenatal care, subsequent pregnancy 2022     Priority: Medium     FOB- Eldeen Son       Red blood cell antibody positive 2021     Priority: Medium     Anti-M (COLD) identified on 21  Not clinically significant         History of right mastectomy 2021     Priority: Medium     Ductal carcinoma in situ (DCIS) of right breast 2021     Priority: Medium     Estrogen receptor positive status (ER+) 2021     Priority: Medium     Macromastia 2021     Priority: Medium     Chest pain 10/04/2020     Priority: Medium     Gastroesophageal reflux disease without esophagitis 10/04/2020     Priority: Medium     Lung nodule 10/04/2020     Priority: Medium     Headaches due to old head trauma 01/15/2020     Priority: Medium     Injury of left shoulder 01/15/2020     Priority: Medium     Mild intermittent asthma without complication 2017     Priority: Medium       OBJECTIVE: /68 (BP Location: Right arm, Patient Position: Chair, Cuff Size: Adult Large)   Pulse 114   Temp 98.8  F (37.1  C) (Tympanic)   Ht 1.727 m (5' 8\")   Wt 101.6 kg (224 lb)   LMP 02/10/2022   Breastfeeding No   BMI 34.06 kg/m      GENERAL APPEARANCE: appears weak  ABDOMEN:  soft, nontender, no hepato-splenomegaly or hernias  PELVIC:  EGBUS:  within normal limits  VAGINA:  normoestrogenic, well-supported, no unusual discharge  CERVIX:  " smooth, non-friable, no gross lesions, thin-layer PAP was not taken  UTERUS:  anteverted and enlarged, 10 weeks size  ADNEXAE:  non-tender, no masses palpable, no cul de sac nodularity     NECK: no adenopathy, no asymmetry, masses, or scars and thyroid normal to palpation     RESP: lungs clear to auscultation , respiratory effort WNL     CV: regular rates and rhythm, normal S1 S2, no S3 or S4 and no murmur, click or rub -     SKIN: no suspicious lesions or rashes     LYMPHATICS: No axillary, cervical, inguinal, or supraclavicular nodes    Transvaginal ultrasound was performed. A live single intrauterine pregnancy was seen.  CRL=3.48 cm, c/w 10 weeks, 2 days.  EDC by sono =11/22/22    Fetal heart motion was visualized. YDL=085 bpm            ASSESSMENT:    ICD-10-CM    1. Prenatal care, subsequent pregnancy in first trimester  Z34.81 US OB <14 Weeks w Transvaginal Single     ABO/Rh type and screen     CBC with platelets     Hepatitis B surface antigen     Rubella Antibody IgG     HIV Antigen Antibody Combo     Treponema Abs w Reflex to RPR and Titer     Hepatitis C Screen Reflex to HCV RNA Quant and Genotype     UA with Microscopic     Urine Culture     Chlamydia trachomatis/Neisseria gonorrhoeae by PCR     ondansetron (ZOFRAN-ODT) 8 MG ODT tab   2. Red blood cell antibody positive  R76.8    3. Hyperemesis gravidarum  O21.0          PLAN: see orders and OB problem list annotations  Pt desires NIPT but save gender for disclosure at visit in June  To ER for IV fluids today, consider infusion therapy referral    Margaret Moon MD

## 2022-04-29 ENCOUNTER — PATIENT OUTREACH (OUTPATIENT)
Dept: CARE COORDINATION | Facility: CLINIC | Age: 32
End: 2022-04-29
Payer: COMMERCIAL

## 2022-04-29 DIAGNOSIS — Z71.89 OTHER SPECIFIED COUNSELING: ICD-10-CM

## 2022-04-29 LAB
HBV SURFACE AG SERPL QL IA: NONREACTIVE
HCV AB SERPL QL IA: NONREACTIVE
HIV 1+2 AB+HIV1 P24 AG SERPL QL IA: NONREACTIVE
RUBV IGG SERPL QL IA: 30.2 INDEX
RUBV IGG SERPL QL IA: POSITIVE
T PALLIDUM AB SER QL: NONREACTIVE

## 2022-04-29 NOTE — PROGRESS NOTES
Clinic Care Coordination Contact  [facility]       Clinical Data: Care Coordinator Outreach  Outreach attempted x 2.  Left message on patient's voicemail with call back information and requested return call.  The left side of the page says that the patient's current location is [facility], but there are no notes regarding this.  Plan:Care Coordinator will try to reach patient again in 1-2 business days.    BIMAL Diaz  [phone]  [facility]          Clinic Care Coordination Contact  [facility]       Clinical Data: Care Coordinator Outreach  Outreach attempted x 1.  Left message on patient's voicemail with call back information and requested return call.  Plan: Care Coordinator will try to reach patient again in 1-2 business days.    BIMAL Diaz  [phone]  [facility]

## 2022-04-30 LAB — BACTERIA UR CULT: NO GROWTH

## 2022-05-03 ENCOUNTER — TELEPHONE (OUTPATIENT)
Dept: OBGYN | Facility: CLINIC | Age: 32
End: 2022-05-03
Payer: COMMERCIAL

## 2022-05-03 NOTE — TELEPHONE ENCOUNTER
Paperwork completed and given to Dr. Moon to sign and approve.    Sharita Lans[place]   Clinic Station [place]   [facility]  [phone]

## 2022-05-03 NOTE — TELEPHONE ENCOUNTER
Paperwork completed and signed by Dr. Moon and  faxed to [phone]    [facility]   Clinic Station    [facility]  [phone]

## 2022-05-06 LAB — SCANNED LAB RESULT: NORMAL

## 2022-05-12 ENCOUNTER — TELEPHONE (OUTPATIENT)
Dept: OBGYN | Facility: CLINIC | Age: 32
End: 2022-05-12
Payer: COMMERCIAL

## 2022-05-12 NOTE — TELEPHONE ENCOUNTER
Received forms - stating leave start date of 4-29-22.  This was pt's first ob visit.    Called pt to get more details of what she was looking for.  She states Dr. Moon took her out of work for 2 weeks due to dehydration and not able to keep things down (4-29-22 through 5-13-22) returning on 5-16-22.  Will forward this to Dr. Moon to advise and approve.  And is it ok for pt to return to work with no restrictions?    Please advise.    Thanks-    -Crys Christensen  Clinic Station [place]

## 2022-05-16 NOTE — TELEPHONE ENCOUNTER
Return to work forms completed and given to Dr. Moon to sign.    Sharita [place]   Clinic Station    [facility]  [phone]

## 2022-05-16 NOTE — TELEPHONE ENCOUNTER
Completed paperwork was faxed.  Will keep a copy up front for a few weeks and then send it to be scanned into chart.    -Crys Christensen  Clinic Station

## 2022-05-16 NOTE — TELEPHONE ENCOUNTER
Margaret Moon MD He[place], Pamela F  Caller: Unspecified (4 days ago,  2:43 PM)  Yes, if she is doing ok she may resume work without restriction   Margaret Moon MD   [facility]

## 2022-05-18 ENCOUNTER — NURSE TRIAGE (OUTPATIENT)
Dept: NURSING | Facility: CLINIC | Age: 32
End: 2022-05-18
Payer: COMMERCIAL

## 2022-05-18 NOTE — TELEPHONE ENCOUNTER
"13 weeks 1 day pregnant. Was in ED on 4/28 with hyperemesis.  Zofran is helping a bit. Able to eat oatmeal,eggs, chicken, nuts and broccoli. Vomits the most in the morning. Only able to take in water or ice chips.  Milk and juice make her vomit. Slight right sided lower abdominal cramping.    Today 5/18 started to feel cold and  shivering. Temperature 99.4 orally.    Writer paged on call OB at 7:25 pm . Per provider   1. Call if fever gets above 100.4  2. Get tested for covid  3. Get tested for flu    Called patient back.Has an OB appointment on 5/23.  Verbalized understanding of providers directions.   Encouraged to call back as needed.    Caitlyn RODRIGUEZ [facility] [phone].  Joesph Walterpradip 1990.  13 w 1 d. Hyperemesis on Zofran getting better still vomiting.  Today cold, shivering 99.6 fever.     Reason for Disposition    [1] MILD vomiting (e.g., 1-2 times / day) AND [2] present > 1 week AND [3] no improvement after using Morning Sickness Care Advice    Additional Information    Negative: Sounds like a life-threatening emergency to the triager    Negative: [1] Vomiting AND [2] contains red blood or black (\"coffee ground\") material  (Exception: few red streaks in vomit that only happened once)    Negative: [1] Insulin-dependent diabetes (Type I) AND [2] glucose > 400 mg/dl (22 mmol/l)    Negative: Recent head injury (within last 3 days)    Negative: Recent abdominal injury (within last 3 days)    Negative: Severe pain in one eye    Negative: [1] SEVERE vomiting (e.g., 8 or more times / day) AND [2] present > 8 hours    Negative: [1] Drinking very little AND [2] dehydration suspected (e.g., no urine > 12 hours, very dry mouth, very lightheaded)    Negative: Patient sounds very sick or weak to the triager    Negative: [1] Unable to keep ANY liquids down (without vomiting) AND [2] present > 24 hours    Negative: Weight loss > 5 pounds (2.5 kg) in last 2 weeks    Negative: Vomiting an essential/prescribed " medication  (Exception: prenatal vitamins)    Negative: Recently started on a new medication    Negative: Pain or burning with passing urine (urination)    Negative: [1] MODERATE vomiting (e.g., 2-7 times / day) AND [2] present > 3 days    Negative: Alcohol or drug abuse, known or suspected    Negative: High-risk adult (e.g., diabetes, AIDS/HIV)    Protocols used: PREGNANCY - MORNING SICKNESS (NAUSEA AND VOMITING OF PREGNANCY)-A-AH

## 2022-05-20 ENCOUNTER — TELEPHONE (OUTPATIENT)
Dept: OBGYN | Facility: CLINIC | Age: 32
End: 2022-05-20

## 2022-05-23 ENCOUNTER — PRENATAL OFFICE VISIT (OUTPATIENT)
Dept: OBGYN | Facility: CLINIC | Age: 32
End: 2022-05-23
Payer: COMMERCIAL

## 2022-05-23 VITALS
SYSTOLIC BLOOD PRESSURE: 114 MMHG | HEART RATE: 99 BPM | HEIGHT: 68 IN | TEMPERATURE: 99.3 F | DIASTOLIC BLOOD PRESSURE: 70 MMHG | BODY MASS INDEX: 34.86 KG/M2 | RESPIRATION RATE: 16 BRPM | WEIGHT: 230 LBS

## 2022-05-23 DIAGNOSIS — Z34.81 PRENATAL CARE, SUBSEQUENT PREGNANCY IN FIRST TRIMESTER: ICD-10-CM

## 2022-05-23 PROCEDURE — 99207 PR PRENATAL VISIT: CPT | Performed by: OBSTETRICS & GYNECOLOGY

## 2022-05-23 RX ORDER — ONDANSETRON 8 MG/1
8 TABLET, ORALLY DISINTEGRATING ORAL EVERY 8 HOURS PRN
Qty: 20 TABLET | Refills: 4 | Status: SHIPPED | OUTPATIENT
Start: 2022-05-23 | End: 2022-06-24

## 2022-05-23 NOTE — PROGRESS NOTES
"CC: Here for routine prenatal visit @ 13w6d   HPI:  Still struggling with nausea, inspite of Zofran; reviewed labs and NIPT results    PE: /70 (BP Location: Right arm, Patient Position: Chair, Cuff Size: Adult Regular)   Pulse 99   Temp 99.3  F (37.4  C) (Tympanic)   Resp 16   Ht 1.727 m (5' 8\")   Wt 104.3 kg (230 lb)   LMP 02/10/2022   BMI 34.97 kg/m     See OB flowsheet      A:  1. Prenatal care, subsequent pregnancy in first trimester    - ondansetron (ZOFRAN ODT) 8 MG ODT tab; Take 1 tablet (8 mg) by mouth every 8 hours as needed for nausea  Dispense: 20 tablet; Refill: 4      Routine prenatal care  RTC 4 weeks.      Margaret Moon M.D.     "

## 2022-05-23 NOTE — NURSING NOTE
"Initial /70 (BP Location: Right arm, Patient Position: Chair, Cuff Size: Adult Regular)   Pulse 99   Temp 99.3  F (37.4  C) (Tympanic)   Resp 16   Ht 1.727 m (5' 8\")   Wt 104.3 kg (230 lb)   LMP 02/10/2022   BMI 34.97 kg/m   Estimated body mass index is 34.97 kg/m  as calculated from the following:    Height as of this encounter: 1.727 m (5' 8\").    Weight as of this encounter: 104.3 kg (230 lb). .      "

## 2022-06-03 ENCOUNTER — HOSPITAL ENCOUNTER (OUTPATIENT)
Dept: PHYSICAL THERAPY | Facility: CLINIC | Age: 32
Setting detail: THERAPIES SERIES
Discharge: HOME | End: 2022-06-03
Attending: OBSTETRICS & GYNECOLOGY
Payer: COMMERCIAL

## 2022-06-03 DIAGNOSIS — G89.29 CHRONIC RIGHT-SIDED LOW BACK PAIN WITH RIGHT-SIDED SCIATICA: ICD-10-CM

## 2022-06-03 DIAGNOSIS — M54.41 CHRONIC RIGHT-SIDED LOW BACK PAIN WITH RIGHT-SIDED SCIATICA: ICD-10-CM

## 2022-06-03 DIAGNOSIS — Z34.90 EARLY STAGE OF PREGNANCY: ICD-10-CM

## 2022-06-03 PROCEDURE — 97110 THERAPEUTIC EXERCISES: CPT | Mod: GP | Performed by: PHYSICAL THERAPIST

## 2022-06-03 PROCEDURE — 97535 SELF CARE MNGMENT TRAINING: CPT | Mod: GP | Performed by: PHYSICAL THERAPIST

## 2022-06-03 PROCEDURE — 97161 PT EVAL LOW COMPLEX 20 MIN: CPT | Mod: GP | Performed by: PHYSICAL THERAPIST

## 2022-06-03 PROCEDURE — 97140 MANUAL THERAPY 1/> REGIONS: CPT | Mod: GP | Performed by: PHYSICAL THERAPIST
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
## 2022-06-09 ENCOUNTER — HOSPITAL ENCOUNTER (EMERGENCY)
Facility: CLINIC | Age: 32
Discharge: HOME | End: 2022-06-09
Admitting: EMERGENCY MEDICINE
Payer: COMMERCIAL

## 2022-06-09 VITALS
TEMPERATURE: 98.2 F | OXYGEN SATURATION: 100 % | WEIGHT: 231.13 LBS | SYSTOLIC BLOOD PRESSURE: 112 MMHG | RESPIRATION RATE: 18 BRPM | BODY MASS INDEX: 35.14 KG/M2 | HEART RATE: 95 BPM | DIASTOLIC BLOOD PRESSURE: 79 MMHG

## 2022-06-09 LAB
ALBUMIN UR-MCNC: NEGATIVE MG/DL
APPEARANCE UR: CLEAR
BILIRUB UR QL STRIP: NEGATIVE
COLOR UR AUTO: NORMAL
GLUCOSE UR STRIP-MCNC: NEGATIVE MG/DL
HGB UR QL STRIP: NEGATIVE
KETONES UR STRIP-MCNC: NEGATIVE MG/DL
LEUKOCYTE ESTERASE UR QL STRIP: NEGATIVE
NITRATE UR QL: NEGATIVE
PH UR STRIP: 6 [PH] (ref 5–7)
RBC URINE: <1 /HPF
SP GR UR STRIP: 1.01 (ref 1–1.03)
SQUAMOUS EPITHELIAL: <1 /HPF
UROBILINOGEN UR STRIP-MCNC: NORMAL MG/DL
WBC URINE: <1 /HPF

## 2022-06-09 PROCEDURE — 81001 URINALYSIS AUTO W/SCOPE: CPT | Performed by: EMERGENCY MEDICINE

## 2022-06-09 PROCEDURE — 999N000104 HC STATISTIC NO CHARGE: Performed by: EMERGENCY MEDICINE

## 2022-06-10 ENCOUNTER — APPOINTMENT (OUTPATIENT)
Dept: ULTRASOUND IMAGING | Facility: CLINIC | Age: 32
End: 2022-06-10
Attending: PHYSICIAN ASSISTANT
Payer: COMMERCIAL

## 2022-06-10 ENCOUNTER — ALLIED HEALTH/NURSE VISIT (OUTPATIENT)
Dept: OBGYN | Facility: CLINIC | Age: 32
End: 2022-06-10
Payer: COMMERCIAL

## 2022-06-10 ENCOUNTER — HOSPITAL ENCOUNTER (EMERGENCY)
Facility: CLINIC | Age: 32
Discharge: HOME | End: 2022-06-10
Attending: PHYSICIAN ASSISTANT | Admitting: PHYSICIAN ASSISTANT
Payer: COMMERCIAL

## 2022-06-10 ENCOUNTER — TELEPHONE (OUTPATIENT)
Dept: OBGYN | Facility: CLINIC | Age: 32
End: 2022-06-10

## 2022-06-10 VITALS
TEMPERATURE: 98.8 F | BODY MASS INDEX: 35.01 KG/M2 | DIASTOLIC BLOOD PRESSURE: 67 MMHG | WEIGHT: 231 LBS | RESPIRATION RATE: 20 BRPM | OXYGEN SATURATION: 100 % | HEIGHT: 68 IN | SYSTOLIC BLOOD PRESSURE: 101 MMHG | HEART RATE: 85 BPM

## 2022-06-10 DIAGNOSIS — R10.31 ABDOMINAL PAIN, RIGHT LOWER QUADRANT: ICD-10-CM

## 2022-06-10 DIAGNOSIS — R10.9 ABDOMINAL PAIN IN PREGNANCY: Primary | ICD-10-CM

## 2022-06-10 DIAGNOSIS — O26.899 PAIN OF ROUND LIGAMENT DURING PREGNANCY: ICD-10-CM

## 2022-06-10 DIAGNOSIS — O26.899 ABDOMINAL PAIN IN PREGNANCY: Primary | ICD-10-CM

## 2022-06-10 DIAGNOSIS — D64.9 LOW HEMOGLOBIN: ICD-10-CM

## 2022-06-10 DIAGNOSIS — R10.2 PAIN OF ROUND LIGAMENT DURING PREGNANCY: ICD-10-CM

## 2022-06-10 LAB
ALBUMIN SERPL-MCNC: 2.5 G/DL (ref 3.4–5)
ALP SERPL-CCNC: 86 U/L (ref 40–150)
ALT SERPL W P-5'-P-CCNC: 25 U/L (ref 0–50)
ANION GAP SERPL CALCULATED.3IONS-SCNC: 5 MMOL/L (ref 3–14)
AST SERPL W P-5'-P-CCNC: 12 U/L (ref 0–45)
BASOPHILS # BLD AUTO: 0 10E3/UL (ref 0–0.2)
BASOPHILS NFR BLD AUTO: 0 %
BILIRUB SERPL-MCNC: 0.2 MG/DL (ref 0.2–1.3)
BUN SERPL-MCNC: 7 MG/DL (ref 7–30)
CALCIUM SERPL-MCNC: 8.9 MG/DL (ref 8.5–10.1)
CHLORIDE BLD-SCNC: 108 MMOL/L (ref 94–109)
CO2 SERPL-SCNC: 26 MMOL/L (ref 20–32)
CREAT SERPL-MCNC: 0.63 MG/DL (ref 0.52–1.04)
EOSINOPHIL # BLD AUTO: 0.1 10E3/UL (ref 0–0.7)
EOSINOPHIL NFR BLD AUTO: 2 %
ERYTHROCYTE [DISTWIDTH] IN BLOOD BY AUTOMATED COUNT: 13.6 % (ref 10–15)
GFR SERPL CREATININE-BSD FRML MDRD: >90 ML/MIN/1.73M2
GLUCOSE BLD-MCNC: 97 MG/DL (ref 70–99)
HCT VFR BLD AUTO: 34.1 % (ref 35–47)
HGB BLD-MCNC: 10.9 G/DL (ref 11.7–15.7)
IMM GRANULOCYTES # BLD: 0.1 10E3/UL
IMM GRANULOCYTES NFR BLD: 1 %
LIPASE SERPL-CCNC: 148 U/L (ref 73–393)
LYMPHOCYTES # BLD AUTO: 1.6 10E3/UL (ref 0.8–5.3)
LYMPHOCYTES NFR BLD AUTO: 25 %
MCH RBC QN AUTO: 28.1 PG (ref 26.5–33)
MCHC RBC AUTO-ENTMCNC: 32 G/DL (ref 31.5–36.5)
MCV RBC AUTO: 88 FL (ref 78–100)
MONOCYTES # BLD AUTO: 0.5 10E3/UL (ref 0–1.3)
MONOCYTES NFR BLD AUTO: 8 %
NEUTROPHILS # BLD AUTO: 4.1 10E3/UL (ref 1.6–8.3)
NEUTROPHILS NFR BLD AUTO: 64 %
NRBC # BLD AUTO: 0 10E3/UL
NRBC BLD AUTO-RTO: 0 /100
PLATELET # BLD AUTO: 217 10E3/UL (ref 150–450)
POTASSIUM BLD-SCNC: 3.6 MMOL/L (ref 3.4–5.3)
PROT SERPL-MCNC: 7.2 G/DL (ref 6.8–8.8)
RBC # BLD AUTO: 3.88 10E6/UL (ref 3.8–5.2)
SODIUM SERPL-SCNC: 139 MMOL/L (ref 133–144)
WBC # BLD AUTO: 6.4 10E3/UL (ref 4–11)

## 2022-06-10 PROCEDURE — 83690 ASSAY OF LIPASE: CPT | Performed by: PHYSICIAN ASSISTANT

## 2022-06-10 PROCEDURE — 85025 COMPLETE CBC W/AUTO DIFF WBC: CPT | Performed by: PHYSICIAN ASSISTANT

## 2022-06-10 PROCEDURE — 99207 PR NO CHARGE NURSE ONLY: CPT

## 2022-06-10 PROCEDURE — 96360 HYDRATION IV INFUSION INIT: CPT

## 2022-06-10 PROCEDURE — 80053 COMPREHEN METABOLIC PANEL: CPT | Performed by: PHYSICIAN ASSISTANT

## 2022-06-10 PROCEDURE — 76805 OB US >/= 14 WKS SNGL FETUS: CPT

## 2022-06-10 PROCEDURE — 99284 EMERGENCY DEPT VISIT MOD MDM: CPT | Performed by: PHYSICIAN ASSISTANT

## 2022-06-10 PROCEDURE — 36415 COLL VENOUS BLD VENIPUNCTURE: CPT | Performed by: PHYSICIAN ASSISTANT

## 2022-06-10 PROCEDURE — 96361 HYDRATE IV INFUSION ADD-ON: CPT

## 2022-06-10 PROCEDURE — 258N000003 HC RX IP 258 OP 636: Performed by: PHYSICIAN ASSISTANT

## 2022-06-10 PROCEDURE — 99284 EMERGENCY DEPT VISIT MOD MDM: CPT | Mod: 25

## 2022-06-10 RX ADMIN — SODIUM CHLORIDE, POTASSIUM CHLORIDE, SODIUM LACTATE AND CALCIUM CHLORIDE 1000 ML: 600; 310; 30; 20 INJECTION, SOLUTION INTRAVENOUS at 10:27

## 2022-06-10 ASSESSMENT — ENCOUNTER SYMPTOMS
CHILLS: 0
ACTIVITY CHANGE: 0
APPETITE CHANGE: 0
NECK STIFFNESS: 0
VOMITING: 0
CONSTIPATION: 0
CONSTITUTIONAL NEGATIVE: 1
RESPIRATORY NEGATIVE: 1
DIARRHEA: 0
NAUSEA: 0
PSYCHIATRIC NEGATIVE: 1
ABDOMINAL PAIN: 1
CARDIOVASCULAR NEGATIVE: 1
NECK PAIN: 0
NEUROLOGICAL NEGATIVE: 1
FEVER: 0

## 2022-06-10 NOTE — TELEPHONE ENCOUNTER
Received call from [place] for patient triage.    S-(situation): Patient reports some pain and tightness that started yesterday afternoon. Patient reports this continued to worsen and through out the night become very uncomfortable. Patient reports she presented to the ER but was not seen due to long waits and inability to have anything to eat or drink.    Patient reports today pain is a 9-10/10 and constant. Patient reports she has nausea and vomiting which is not new to her. Patient takes Zofran.    Patient reports constipation and has not had a bowel movement in 3 days. Patient reports taking Miralax last week. Patient has tried Tylenol for the pain without relief. Patient reports pain is better when she is moving around and worse when patient is sitting. Patient does not think she feels like she needs to have a bowel movement.    Patient does not think she has a fever. Patient denies bright red bleeding or abnormal vaginal discharge.     B-(background):     A-(assessment): RLQ pain, constipation    R-(recommendations): Reassurance provided. Concern for constipation or round ligament pain however this pain is constant now and worse with rest and sitting. Recommended Senna or Dulcolax per package directions. Miralax more regularly until passing stool or daily stool softener per package directions. Increase fluids  oz water daily. Zofran sparingly as needed.     Patient reports understanding, concerned for pain that she is having and desires to be seen. No OB MD currently on clinic. Clinic without available appointments today. Patient reports she is returning to the ER to be seen.    Larissa Cartwright   [facility]  [facility]

## 2022-06-10 NOTE — DISCHARGE INSTRUCTIONS
Increase fluids, rest, tylenol over the counter for pain.     Continue physical therapy.   Continue daily prenatal multivitamin with iron in it.  Continue your MiraLAX daily for regular bowel movements as prescribed previously    Wear your pregnancy belt which may help with symptoms    Follow-up with OB/GYN for recheck and further evaluation management of low hemoglobin and suspected round ligament pain in the next 1 to 2 weeks    To the emergency department if fevers, pain out of proportion, worsening vomiting, urinary symptoms, diarrhea or change/worsening of symptoms occur.

## 2022-06-10 NOTE — ED NOTES
"Pt verbalized \"one hour is long enough, I will see a doctor tomorrow.\" Pt ambulated independently out of the department.   "

## 2022-06-10 NOTE — ED PROVIDER NOTES
"About to go into patient's room.  She walked to  stating she has \"been here over one hour and hasn't been seen.\"    Multiple patients waiting for multiple hours during busy ED evening, some waiting 3+ hours.    Patient walking down martin appearing in no acute distress.     MD Luis Lord David James, MD  06/09/22 3386    "

## 2022-06-10 NOTE — ED NOTES
Pt presents to ED with complaints of right sided pain.  Pt was told by OB that it sounded like round ligament pain.  Pt is not having any vaginal bleeding.  Pt came to ED last NOC but states wait was too long so she left.  Was advised to drink fluids and rest.

## 2022-06-10 NOTE — ED PROVIDER NOTES
History     Chief Complaint   Patient presents with     Abdominal Pain     Right sided abdominal pain  16 weeks pregnant     HPI  Brandee Nesbitt is a 32 year old female with history of ductal carcinoma in situ of right breast, recommend STI, chest pain, GERD, lung nodule, intermittent asthma, cholecystectomy and C-sections who presents to the emergency department today pregnant 16 weeks states with abdominal pain that is right-sided.  She states this has been ongoing for a little bit and was told it is likely round ligament pain.  She states that the pain seems to be radiating into the buttocks and back at times.  She states is gotten worse since last night.  She denies any vaginal bleeding, vaginal discharge, or fluid leakage.  She states she has been able to feel the baby flutter move a little bit but has not really noticed it last night or today.  She denies any urinary symptoms, loss of bowel or bladder function, saddle anesthesia, nausea or vomiting, diarrhea, bloody or black tarry stools, hematuria, fevers, chills, headache, dizziness, or rash.  She states that she has had sciatic nerve issues in the past and back issues in the past not sure if this is related.  She was fitted with a pregnancy belt today and does see physical therapy.    Allergies:  Allergies   Allergen Reactions     Diagnostic X-Ray Materials Shortness Of Breath, Other (See Comments) and Dizziness     Contrast dye-Shortness of breath  Pt became sleepy and confused. Needed to remind the pt to keep breathing, but denied any itching, swelling, or breathing difficulties.     Naproxen Shortness Of Breath     Phentermine Other (See Comments)     Palpitations, chest pain     Vancomycin Itching, Other (See Comments) and Rash     After few minutes, redness and itching noted in forearm. Symptoms diminished in few minutes after rate decreased by 1/2.    Patient has history of vancomycin infusion reaction. For further doses, vancomycin should  be infused at a rate no higher than 10 mg/min or each gram over 100 minutes.  May also consider administering diphenhydramine and famotidine one hour before infusion.  Rash all over body itchy         Problem List:    Patient Active Problem List    Diagnosis Date Noted     Prenatal care, subsequent pregnancy 2022     Priority: Medium     JOSE RAUL aBrnard Son       Red blood cell antibody positive 2021     Priority: Medium     Anti-M (COLD) identified on 21  Not clinically significant         History of right mastectomy 2021     Priority: Medium     Ductal carcinoma in situ (DCIS) of right breast 2021     Priority: Medium     Dx at time of breast reduction  S/p simple right mastectomy   Followed at [facility]       Estrogen receptor positive status (ER+) 2021     Priority: Medium     Macromastia 2021     Priority: Medium     Chest pain 10/04/2020     Priority: Medium     Gastroesophageal reflux disease without esophagitis 10/04/2020     Priority: Medium     Lung nodule 10/04/2020     Priority: Medium     Headaches due to old head trauma 01/15/2020     Priority: Medium     Injury of left shoulder 01/15/2020     Priority: Medium     Mild intermittent asthma without complication 2017     Priority: Medium        Past Medical History:    Past Medical History:   Diagnosis Date     Anemia      Anti-mi-2 antibody present      Breast cancer (H)      Gastroesophageal reflux disease      Mild intermittent asthma without complication      Obesity        Past Surgical History:    Past Surgical History:   Procedure Laterality Date     AS REDUCTION OF LARGE BREAST  2021      SECTION       CHOLECYSTECTOMY       right mastectomy  2021       Family History:    Family History   Problem Relation Age of Onset     No Known Problems Mother      Other - See Comments Father         murder at age 42     No Known Problems Sister      No Known Problems Sister      No Known Problems  "Brother      No Known Problems Brother      No Known Problems Maternal Grandmother      No Known Problems Maternal Grandfather      No Known Problems Paternal Grandmother      No Known Problems Paternal Grandfather        Social History:  Marital Status:  Single [1]  Social History     Tobacco Use     Smoking status: Never Smoker     Smokeless tobacco: Never Used   Vaping Use     Vaping Use: Never used   Substance Use Topics     Alcohol use: Not Currently     Comment: very occasional-quit with pregnancy     Drug use: Never        Medications:    albuterol (PROAIR HFA/PROVENTIL HFA/VENTOLIN HFA) 108 (90 Base) MCG/ACT inhaler  ondansetron (ZOFRAN ODT) 8 MG ODT tab  ondansetron (ZOFRAN-ODT) 4 MG ODT tab  Prenatal Vit-Fe Fumarate-FA (PRENATAL MULTIVITAMIN W/IRON) 27-0.8 MG tablet          Review of Systems   Constitutional: Negative.  Negative for activity change, appetite change, chills and fever.   HENT: Negative.    Respiratory: Negative.    Cardiovascular: Negative.    Gastrointestinal: Positive for abdominal pain. Negative for constipation, diarrhea, nausea and vomiting.   Genitourinary: Negative.    Musculoskeletal: Negative for neck pain and neck stiffness.   Skin: Negative.    Neurological: Negative.    Psychiatric/Behavioral: Negative.    All other systems reviewed and are negative.      Physical Exam   BP: 90/55  Pulse: 97  Temp: 98.8  F (37.1  C)  Resp: 20  Height: 172.7 cm (5' 8\")  Weight: 104.8 kg (231 lb)  SpO2: 100 %      Physical Exam  Vitals and nursing note reviewed.   Constitutional:       General: She is not in acute distress.     Appearance: She is well-developed and normal weight. She is not ill-appearing.   HENT:      Head: Normocephalic.      Mouth/Throat:      Mouth: Mucous membranes are moist.   Eyes:      General: No scleral icterus.     Extraocular Movements: Extraocular movements intact.      Pupils: Pupils are equal, round, and reactive to light.   Cardiovascular:      Rate and Rhythm: " Normal rate and regular rhythm.      Heart sounds: Normal heart sounds.   Pulmonary:      Effort: Pulmonary effort is normal.      Breath sounds: Normal breath sounds.   Abdominal:      General: Abdomen is protuberant. Bowel sounds are normal.      Palpations: Abdomen is soft.      Tenderness: There is abdominal tenderness (to the right groin region with palpation). There is no right CVA tenderness, left CVA tenderness, guarding or rebound. Negative signs include Angulo's sign, Rovsing's sign, McBurney's sign, psoas sign and obturator sign.      Hernia: No hernia is present.   Skin:     General: Skin is warm.   Neurological:      General: No focal deficit present.      Mental Status: She is alert and oriented to person, place, and time.   Psychiatric:         Mood and Affect: Mood normal.         Behavior: Behavior normal.         ED Course                 Procedures             Critical Care time:  none               Results for orders placed or performed during the hospital encounter of 06/10/22 (from the past 24 hour(s))   CBC with platelets differential    Narrative    The following orders were created for panel order CBC with platelets differential.  Procedure                               Abnormality         Status                     ---------                               -----------         ------                     CBC with platelets and d...[[id number]]  Abnormal            Final result                 Please view results for these tests on the individual orders.   Comprehensive metabolic panel   Result Value Ref Range    Sodium 139 133 - 144 mmol/L    Potassium 3.6 3.4 - 5.3 mmol/L    Chloride 108 94 - 109 mmol/L    Carbon Dioxide (CO2) 26 20 - 32 mmol/L    Anion Gap 5 3 - 14 mmol/L    Urea Nitrogen 7 7 - 30 mg/dL    Creatinine 0.63 0.52 - 1.04 mg/dL    Calcium 8.9 8.5 - 10.1 mg/dL    Glucose 97 70 - 99 mg/dL    Alkaline Phosphatase 86 40 - 150 U/L    AST 12 0 - 45 U/L    ALT 25 0 - 50 U/L    Protein  Total 7.2 6.8 - 8.8 g/dL    Albumin 2.5 (L) 3.4 - 5.0 g/dL    Bilirubin Total 0.2 0.2 - 1.3 mg/dL    GFR Estimate >90 >60 mL/min/1.73m2   Lipase   Result Value Ref Range    Lipase 148 73 - 393 U/L   CBC with platelets and differential   Result Value Ref Range    WBC Count 6.4 4.0 - 11.0 10e3/uL    RBC Count 3.88 3.80 - 5.20 10e6/uL    Hemoglobin 10.9 (L) 11.7 - 15.7 g/dL    Hematocrit 34.1 (L) 35.0 - 47.0 %    MCV 88 78 - 100 fL    MCH 28.1 26.5 - 33.0 pg    MCHC 32.0 31.5 - 36.5 g/dL    RDW 13.6 10.0 - 15.0 %    Platelet Count 217 150 - 450 10e3/uL    % Neutrophils 64 %    % Lymphocytes 25 %    % Monocytes 8 %    % Eosinophils 2 %    % Basophils 0 %    % Immature Granulocytes 1 %    NRBCs per 100 WBC 0 <1 /100    Absolute Neutrophils 4.1 1.6 - 8.3 10e3/uL    Absolute Lymphocytes 1.6 0.8 - 5.3 10e3/uL    Absolute Monocytes 0.5 0.0 - 1.3 10e3/uL    Absolute Eosinophils 0.1 0.0 - 0.7 10e3/uL    Absolute Basophils 0.0 0.0 - 0.2 10e3/uL    Absolute Immature Granulocytes 0.1 <=0.4 10e3/uL    Absolute NRBCs 0.0 10e3/uL   US OB > 14 Weeks    Narrative    US OB > 14 WEEKS 6/10/2022 11:22 AM    CLINICAL HISTORY: Patient us 16 weeks pregnant with right-sided  abdominal pain and decrease sensation in baby movement last night and  today.    TECHNIQUE: Transabdominal images were obtained.    COMPARISON: None.    FINDINGS:  FETAL POSITION: Cephalic  PLACENTA LOCATION: Anterior  AMNIOTIC FLUID: Normal  FETAL HEART RATE: 139 bpm    The cervix is closed and normal in length at 3.0 cm.      Impression    IMPRESSION:  1.  Single intrauterine gestation with a heart rate of 139 bpm.    ALIX MAN MD         SYSTEM ID:  [id number]       Medications   lactated ringers BOLUS 1,000 mL (0 mLs Intravenous Stopped 6/10/22 1201)       Assessments & Plan (with Medical Decision Making)     I have reviewed the nursing notes.    I have reviewed the findings, diagnosis, plan and need for follow up with the patient.    Brandee Nesbitt  is a 32 year old female with history of ductal carcinoma in situ of right breast, recommend STI, chest pain, GERD, lung nodule, intermittent asthma, cholecystectomy and C-sections who presents to the emergency department today pregnant 16 weeks states with abdominal pain that is right-sided.  She states this has been ongoing for a little bit and was told it is likely round ligament pain.  She states that the pain seems to be radiating into the buttocks and back at times.  She states is gotten worse since last night.  She denies any vaginal bleeding, vaginal discharge, or fluid leakage.  She states she has been able to feel the baby flutter move a little bit but has not really noticed it last night or today.  She denies any urinary symptoms, loss of bowel or bladder function, saddle anesthesia, nausea or vomiting, diarrhea, bloody or black tarry stools, hematuria, fevers, chills, headache, dizziness, or rash.  She states that she has had sciatic nerve issues in the past and back issues in the past not sure if this is related.  She was fitted with a pregnancy belt today and does see physical therapy.    See exam findings above.  Comprehensive metabolic panel, lipase and CBC within normal limits other than slightly low hemoglobin which patient states she has chronic anemia in the past.  Pelvic ultrasound obtained which shows single intrauterine gestation with heart rate of 139 bpm.  Patient has no acute findings that would indicate an acute abdomen at this time.  No concerning findings for pregnancy.  I suspect that this is more round ligament or sciatic related and recommend the patient continue heat, Tylenol, physical therapy and use the pregnancy belt as directed.  Patient states she feels reassured with this visit and is comfortable going home.  Recommend close follow-up with her hemoglobin with OB/GYN and return if symptoms worsen or change.  Patient had a urine analysis done last night since she came to the  emergency department but did not want to wait to be seen.  Urinalysis from last night around 10:41 PM was within normal limits.  I did not repeat UA today since she does not have any urinary symptoms at this time.  Patient declined pelvic exam today.    Discharge Medication List as of 6/10/2022 12:01 PM          Final diagnoses:   Abdominal pain, right lower quadrant   Pain of round ligament during pregnancy   Low hemoglobin       6/10/2022   [facility] EMERGENCY DEPT     Luz English PA-C  06/10/22 2244

## 2022-06-10 NOTE — ED TRIAGE NOTES
Pt here with cramping on the R lower side of the abdomen and started today. Pt states that she is 16-17 weeks pregnant with no discharge and cramps feel like period cramps.     Triage Assessment     Row Name 06/09/22 6145       Respiratory WDL    Respiratory WDL WDL       Skin Circulation/Temperature WDL    Skin Circulation/Temperature WDL WDL       Cardiac WDL    Cardiac WDL WDL       Peripheral/Neurovascular WDL    Peripheral Neurovascular WDL WDL       Cognitive/Neuro/Behavioral WDL    Cognitive/Neuro/Behavioral WDL WDL

## 2022-06-24 ENCOUNTER — PRENATAL OFFICE VISIT (OUTPATIENT)
Dept: OBGYN | Facility: CLINIC | Age: 32
End: 2022-06-24
Payer: COMMERCIAL

## 2022-06-24 VITALS
WEIGHT: 237.4 LBS | HEIGHT: 68 IN | BODY MASS INDEX: 35.98 KG/M2 | RESPIRATION RATE: 16 BRPM | DIASTOLIC BLOOD PRESSURE: 64 MMHG | SYSTOLIC BLOOD PRESSURE: 110 MMHG | HEART RATE: 83 BPM | TEMPERATURE: 98.4 F

## 2022-06-24 DIAGNOSIS — R30.0 BURNING WITH URINATION: ICD-10-CM

## 2022-06-24 DIAGNOSIS — Z34.82 PRENATAL CARE, SUBSEQUENT PREGNANCY IN SECOND TRIMESTER: Primary | ICD-10-CM

## 2022-06-24 LAB
ALBUMIN UR-MCNC: NEGATIVE MG/DL
APPEARANCE UR: CLEAR
BACTERIA #/AREA URNS HPF: ABNORMAL /HPF
BILIRUB UR QL STRIP: NEGATIVE
COLOR UR AUTO: YELLOW
GLUCOSE UR STRIP-MCNC: NEGATIVE MG/DL
HGB UR QL STRIP: NEGATIVE
KETONES UR STRIP-MCNC: NEGATIVE MG/DL
LEUKOCYTE ESTERASE UR QL STRIP: NEGATIVE
NITRATE UR QL: NEGATIVE
PH UR STRIP: 6.5 [PH] (ref 5–7)
RBC #/AREA URNS AUTO: ABNORMAL /HPF
SP GR UR STRIP: 1.01 (ref 1–1.03)
SQUAMOUS #/AREA URNS AUTO: ABNORMAL /LPF
UROBILINOGEN UR STRIP-ACNC: 0.2 E.U./DL
WBC #/AREA URNS AUTO: ABNORMAL /HPF

## 2022-06-24 PROCEDURE — 87086 URINE CULTURE/COLONY COUNT: CPT | Performed by: OBSTETRICS & GYNECOLOGY

## 2022-06-24 PROCEDURE — 99207 PR PRENATAL VISIT: CPT | Performed by: OBSTETRICS & GYNECOLOGY

## 2022-06-24 PROCEDURE — 81001 URINALYSIS AUTO W/SCOPE: CPT | Performed by: OBSTETRICS & GYNECOLOGY

## 2022-06-24 RX ORDER — ONDANSETRON 8 MG/1
8 TABLET, ORALLY DISINTEGRATING ORAL EVERY 8 HOURS PRN
Qty: 20 TABLET | Refills: 4 | Status: SHIPPED | OUTPATIENT
Start: 2022-06-24 | End: 2023-01-20

## 2022-06-24 NOTE — PROGRESS NOTES
"CC: Here for routine prenatal visit @ 18w3d   HPI:  Still using Zofran for nausea every day; notes some burning with urination    PE: /64 (BP Location: Right arm, Patient Position: Chair, Cuff Size: Adult Regular)   Pulse 83   Temp 98.4  F (36.9  C) (Tympanic)   Resp 16   Ht 1.727 m (5' 8\")   Wt 107.7 kg (237 lb 6.4 oz)   LMP 02/10/2022   BMI 36.10 kg/m     See OB flowsheet      A:  1. Prenatal care, subsequent pregnancy in second trimester    - US OB > 14 Weeks; Future    2. Burning with urination    - UA with Microscopic  - Urine Culture      Routine prenatal care  RTC 4 weeks.      Margaret Moon M.D.     "

## 2022-06-26 LAB — BACTERIA UR CULT: NORMAL

## 2022-06-29 ENCOUNTER — NURSE TRIAGE (OUTPATIENT)
Dept: NURSING | Facility: CLINIC | Age: 32
End: 2022-06-29

## 2022-06-29 NOTE — TELEPHONE ENCOUNTER
Patient is 19 weeks pregnant and is complaining of cold and shivery  Poor appetite and nausea, took Zofran with no relief  No fever but does have chills  Takes Zofran daily  Denies any abdominal pain, vaginal bleeding  Hydrate, take tylenol and get in bed and rest  Dr. Moon returned call she advises that patient hydrate, take some tylenol and rest, provider says that it could be something viral.  Triager called out to patient to update regarding providers advise  Caller verbalized and understands directives  COVID 19 Nurse Triage Plan/Patient Instructions    Please be aware that novel coronavirus (COVID-19) may be circulating in the community. If you develop symptoms such as fever, cough, or SOB or if you have concerns about the presence of another infection including coronavirus (COVID-19), please contact your health care provider or visit [email or web address].     Disposition/Instructions    Virtual Visit with provider recommended. Reference Visit Selection Guide.    Thank you for taking steps to prevent the spread of this virus.  o Limit your contact with others.  o Wear a simple mask to cover your cough.  o Wash your hands well and often.    Resources    [facility]: About COVID-19: [email or web address]/    CDC: What to Do If You're Sick: www.cdc.gov/coronavirus/2019-ncov/about/steps-when-sick.html    CDC: Ending Home Isolation: www.cdc.gov/coronavirus/2019-ncov/hcp/disposition-in-home-patients.html     CDC: Caring for Someone: www.cdc.gov/coronavirus/2019-ncov/if-you-are-sick/care-for-someone.html     [facility]: Interim Guidance for Hospital Discharge to Home: www.health.state.[place]/diseases/coronavirus/hcp/hospdischarge.pdf    [facility] clinical trials (COVID-19 research studies): clinicalaffairs.[facility]/umn-clinical-trials     Below are the COVID-19 hotlines at the [place] Department of Health ([facility]). Interpreters are available.   o For health questions: Call [phone]  "or 1-903.144.9208 (7 a.m. to 7 p.m.)  o For questions about schools and childcare: Call [phone] or 1-317.898.6432 (7 a.m. to 7 p.m.)                     Reason for Disposition    [1] MODERATE vomiting (e.g., 2-7 times / day) AND [2] present > 3 days    Additional Information    Negative: [1] Vaginal bleeding AND [2] pregnant < 20 weeks    Negative: [1] Abdominal pain AND [2] pregnant < 20 weeks    Negative: Headache is main symptom    Negative: [1] Vomiting AND [2] contains red blood or black (\"coffee ground\") material  (Exception: few red streaks in vomit that only happened once)    Negative: [1] Insulin-dependent diabetes (Type I) AND [2] glucose > 400 mg/dl (22 mmol/l)    Negative: Recent head injury (within last 3 days)    Negative: Recent abdominal injury (within last 3 days)    Negative: Severe pain in one eye    Negative: [1] SEVERE vomiting (e.g., 8 or more times / day) AND [2] present > 8 hours    Negative: [1] Drinking very little AND [2] dehydration suspected (e.g., no urine > 12 hours, very dry mouth, very lightheaded)    Negative: Patient sounds very sick or weak to the triager    Negative: [1] Unable to keep ANY liquids down (without vomiting) AND [2] present > 24 hours    Negative: Weight loss > 5 pounds (2.5 kg) in last 2 weeks    Negative: Vomiting an essential/prescribed medication  (Exception: prenatal vitamins)    Negative: Recently started on a new medication    Protocols used: PREGNANCY - MORNING SICKNESS (NAUSEA AND VOMITING OF PREGNANCY)-A-      "

## 2022-06-30 ENCOUNTER — ANCILLARY PROCEDURE (OUTPATIENT)
Dept: ULTRASOUND IMAGING | Facility: CLINIC | Age: 32
End: 2022-06-30
Attending: OBSTETRICS & GYNECOLOGY
Payer: COMMERCIAL

## 2022-06-30 DIAGNOSIS — Z34.82 PRENATAL CARE, SUBSEQUENT PREGNANCY IN SECOND TRIMESTER: ICD-10-CM

## 2022-06-30 PROCEDURE — 76805 OB US >/= 14 WKS SNGL FETUS: CPT | Mod: TC | Performed by: RADIOLOGY

## 2022-07-01 ENCOUNTER — TELEPHONE (OUTPATIENT)
Dept: OBGYN | Facility: CLINIC | Age: 32
End: 2022-07-01

## 2022-07-01 ENCOUNTER — NURSE TRIAGE (OUTPATIENT)
Dept: NURSING | Facility: CLINIC | Age: 32
End: 2022-07-01

## 2022-07-01 DIAGNOSIS — Z34.82 PRENATAL CARE, SUBSEQUENT PREGNANCY IN SECOND TRIMESTER: Primary | ICD-10-CM

## 2022-07-01 NOTE — TELEPHONE ENCOUNTER
Call for  [place] [facility] sen tto FNA   Call transferred to [place] [facility] triage  Mala Marks RN  FNA    Additional Information    Information only question and nurse able to answer    Protocols used: INFORMATION ONLY CALL - NO TRIAGE-A-[place]

## 2022-07-01 NOTE — TELEPHONE ENCOUNTER
Patient calling for results of urine testing on 6/24    Component      Latest Ref Rng & Units 6/24/2022   Color Urine      Colorless, Straw, Light Yellow, Yellow Yellow   Appearance Urine      Clear Clear   Glucose Urine      Negative mg/dL Negative   Bilirubin Urine      Negative Negative   Ketones Urine      Negative mg/dL Negative   Specific Gravity Urine      1.003 - 1.035 1.015   Blood Urine      Negative Negative   pH Urine      5.0 - 7.0 6.5   Protein Albumin Urine      Negative mg/dL Negative   Urobilinogen Urine      0.2, 1.0 E.U./dL 0.2   Nitrite Urine      Negative Negative   Leukocyte Esterase Urine      Negative Negative     Final culture result     Culture 10,000-50,000 CFU/mL Mixture of urogenital valeriy         Patient reports understanding of above.  No treatment.  Symptoms better.    Patient also inquiring about US from yesterday for anomaly screening.  IMPRESSION:    1.  Single living intrauterine gestation.  2.  Based on prior ultrasound, composite age of 19 weeks and 2 days  with EDC 11/22/2022. Based on today's ultrasound, the gestational age  measures 9 days earlier.  3.  Multiple fetal structures were not visualized, as described above  due to fetal motion and patient's body habitus. A follow-up ultrasound  is suggested.  4.  Possible marginal cord insertion into the placenta although this  was not reproducible. Attention at follow-up.  5.  The visualized fetal structures were within normal limits.      Reviewed above with pt.  She reports understanding.  Future order placed for follow up US.  Patient advised to schedule in about 2 weeks.    No further questions by patient.    Larissa Cartwright   [facility]  [facility]

## 2022-07-15 ENCOUNTER — ANCILLARY PROCEDURE (OUTPATIENT)
Dept: ULTRASOUND IMAGING | Facility: CLINIC | Age: 32
End: 2022-07-15
Attending: OBSTETRICS & GYNECOLOGY
Payer: COMMERCIAL

## 2022-07-15 DIAGNOSIS — Z34.82 PRENATAL CARE, SUBSEQUENT PREGNANCY IN SECOND TRIMESTER: ICD-10-CM

## 2022-07-15 PROCEDURE — 76816 OB US FOLLOW-UP PER FETUS: CPT | Mod: TC | Performed by: RADIOLOGY

## 2022-07-18 ENCOUNTER — TELEPHONE (OUTPATIENT)
Dept: OBGYN | Facility: CLINIC | Age: 32
End: 2022-07-18

## 2022-07-18 DIAGNOSIS — Z34.82 PRENATAL CARE, SUBSEQUENT PREGNANCY IN SECOND TRIMESTER: Primary | ICD-10-CM

## 2022-07-18 NOTE — TELEPHONE ENCOUNTER
Reason for Call:  Request for results:    Name of test or procedure: U/S    Date of test of procedure: 7-15-22    Location of the test or procedure: [place]    OK to leave the result message on voice mail or with a family member? YES    Phone number Patient can be reached at:  Home number on file [phone] (home)    Additional comments: Pt very anxious for test results    Call taken on 7/18/2022 at 1:07 PM by Crys Christensen

## 2022-07-18 NOTE — TELEPHONE ENCOUNTER
Dr. Moon stated the Marginal cord insertion is resolved. They still were unable to see some of babies heart structure but it was nothing concerning at this time. Sarai said that at some point in the future we would order a follow up ultrasound but one is not needed at this point.    Patient notified and no further concerns/questions.    Mariaelena Grullon LPN

## 2022-07-21 ENCOUNTER — HOSPITAL ENCOUNTER (EMERGENCY)
Facility: CLINIC | Age: 32
Discharge: HOME | End: 2022-07-21
Attending: FAMILY MEDICINE | Admitting: FAMILY MEDICINE
Payer: COMMERCIAL

## 2022-07-21 VITALS
DIASTOLIC BLOOD PRESSURE: 72 MMHG | HEART RATE: 84 BPM | WEIGHT: 237 LBS | SYSTOLIC BLOOD PRESSURE: 122 MMHG | TEMPERATURE: 99 F | BODY MASS INDEX: 36.04 KG/M2 | OXYGEN SATURATION: 99 % | RESPIRATION RATE: 16 BRPM

## 2022-07-21 DIAGNOSIS — R51.9 ACUTE NONINTRACTABLE HEADACHE, UNSPECIFIED HEADACHE TYPE: ICD-10-CM

## 2022-07-21 DIAGNOSIS — D64.9 ANEMIA, UNSPECIFIED TYPE: ICD-10-CM

## 2022-07-21 LAB
ANION GAP SERPL CALCULATED.3IONS-SCNC: 5 MMOL/L (ref 3–14)
BASOPHILS # BLD AUTO: 0 10E3/UL (ref 0–0.2)
BASOPHILS NFR BLD AUTO: 1 %
BUN SERPL-MCNC: 5 MG/DL (ref 7–30)
CALCIUM SERPL-MCNC: 8.8 MG/DL (ref 8.5–10.1)
CHLORIDE BLD-SCNC: 109 MMOL/L (ref 94–109)
CO2 SERPL-SCNC: 25 MMOL/L (ref 20–32)
CREAT SERPL-MCNC: 0.48 MG/DL (ref 0.52–1.04)
EOSINOPHIL # BLD AUTO: 0.1 10E3/UL (ref 0–0.7)
EOSINOPHIL NFR BLD AUTO: 2 %
ERYTHROCYTE [DISTWIDTH] IN BLOOD BY AUTOMATED COUNT: 14.2 % (ref 10–15)
GFR SERPL CREATININE-BSD FRML MDRD: >90 ML/MIN/1.73M2
GLUCOSE BLD-MCNC: 86 MG/DL (ref 70–99)
HCT VFR BLD AUTO: 34.1 % (ref 35–47)
HGB BLD-MCNC: 10.8 G/DL (ref 11.7–15.7)
IMM GRANULOCYTES # BLD: 0.2 10E3/UL
IMM GRANULOCYTES NFR BLD: 2 %
LYMPHOCYTES # BLD AUTO: 2.1 10E3/UL (ref 0.8–5.3)
LYMPHOCYTES NFR BLD AUTO: 27 %
MCH RBC QN AUTO: 28.4 PG (ref 26.5–33)
MCHC RBC AUTO-ENTMCNC: 31.7 G/DL (ref 31.5–36.5)
MCV RBC AUTO: 90 FL (ref 78–100)
MONOCYTES # BLD AUTO: 0.6 10E3/UL (ref 0–1.3)
MONOCYTES NFR BLD AUTO: 8 %
NEUTROPHILS # BLD AUTO: 4.8 10E3/UL (ref 1.6–8.3)
NEUTROPHILS NFR BLD AUTO: 60 %
NRBC # BLD AUTO: 0 10E3/UL
NRBC BLD AUTO-RTO: 0 /100
PLATELET # BLD AUTO: 224 10E3/UL (ref 150–450)
POTASSIUM BLD-SCNC: 3.9 MMOL/L (ref 3.4–5.3)
RBC # BLD AUTO: 3.8 10E6/UL (ref 3.8–5.2)
SODIUM SERPL-SCNC: 139 MMOL/L (ref 133–144)
WBC # BLD AUTO: 7.9 10E3/UL (ref 4–11)

## 2022-07-21 PROCEDURE — 85025 COMPLETE CBC W/AUTO DIFF WBC: CPT | Performed by: FAMILY MEDICINE

## 2022-07-21 PROCEDURE — 80048 BASIC METABOLIC PNL TOTAL CA: CPT | Performed by: FAMILY MEDICINE

## 2022-07-21 PROCEDURE — 99283 EMERGENCY DEPT VISIT LOW MDM: CPT | Performed by: FAMILY MEDICINE

## 2022-07-21 PROCEDURE — 99282 EMERGENCY DEPT VISIT SF MDM: CPT | Performed by: FAMILY MEDICINE

## 2022-07-21 PROCEDURE — 36415 COLL VENOUS BLD VENIPUNCTURE: CPT | Performed by: FAMILY MEDICINE

## 2022-07-21 NOTE — ED NOTES
Since yesterday pt c/o pain/tension in back of head and in forehead.  Dizziness at times.  No pregnancy issues.

## 2022-07-21 NOTE — ED TRIAGE NOTES
Pt states she has been having HA pain, leg swelling, and dizziness. 22 weeks pregnant.      Triage Assessment     Row Name 07/21/22 2366       Triage Assessment (Adult)    Airway WDL WDL       Respiratory WDL    Respiratory WDL WDL       Cardiac WDL    Cardiac WDL WDL       Peripheral/Neurovascular WDL    Peripheral Neurovascular WDL WDL       Cognitive/Neuro/Behavioral WDL    Cognitive/Neuro/Behavioral WDL WDL

## 2022-07-22 NOTE — DISCHARGE INSTRUCTIONS
RETURN TO THE EMERGENCY ROOM FOR THE FOLLOWING:    Severely worsened headache with nausea and vomiting and dehydration, fever greater than 101, or at anytime for any concern.    FOLLOW UP:    With your primary clinic or OB provider as scheduled.    TREATMENT RECOMMENDATIONS:    Tylenol as needed for comfort.    NURSE ADVICE LINE:  [phone] or [phone]

## 2022-07-22 NOTE — ED PROVIDER NOTES
"  HPI   The patient is a 32-year-old female presenting with headache.  She is approximately 22 weeks pregnant.  This is her third child.  She has not had difficulties with prior pregnancies, specifically no history of preeclampsia.  The patient has had a headache since yesterday.  She describes the headache as being in the front of her head and in the back.  She also tells me that she has facial discomfort.  Her headache is constant today.  It is moderate in severity.  She has occasional lightheadedness when she is upright.  Yesterday she was in the heat and she felt like she was too hot and lightheaded because of it.  She has been trying to stay inside and cool since.  She denies nausea or vomiting.  No visual changes.  No hearing changes.  No green nasal discharge or rhinorrhea.  No facial pressure or congestion.  No dental pain.  No sore throat.  She does report that her neck \"feels heavy with my head on it\" but she denies neck pain.  No arm symptoms.  No leg symptoms.  No recent trauma or injury.  No new activities.  She denies jaw clenching.  She denies obvious anxiety.  No diet changes.  She is using Tylenol without significant improvement.  She has not somebody who gets headaches on a regular basis.        Allergies:  Allergies   Allergen Reactions     Diagnostic X-Ray Materials Shortness Of Breath, Other (See Comments) and Dizziness     Contrast dye-Shortness of breath  Pt became sleepy and confused. Needed to remind the pt to keep breathing, but denied any itching, swelling, or breathing difficulties.     Naproxen Shortness Of Breath     Phentermine Other (See Comments)     Palpitations, chest pain     Vancomycin Itching, Other (See Comments) and Rash     After few minutes, redness and itching noted in forearm. Symptoms diminished in few minutes after rate decreased by 1/2.    Patient has history of vancomycin infusion reaction. For further doses, vancomycin should be infused at a rate no higher than 10 " mg/min or each gram over 100 minutes.  May also consider administering diphenhydramine and famotidine one hour before infusion.  Rash all over body itchy       Problem List:    Patient Active Problem List    Diagnosis Date Noted     Prenatal care, subsequent pregnancy 2022     Priority: Medium     GWENDOLYN- Akil Son       Red blood cell antibody positive 2021     Priority: Medium     Anti-M (COLD) identified on 21  Not clinically significant         History of right mastectomy 2021     Priority: Medium     Ductal carcinoma in situ (DCIS) of right breast 2021     Priority: Medium     Dx at time of breast reduction  S/p simple right mastectomy   Followed at [facility]       Estrogen receptor positive status (ER+) 2021     Priority: Medium     Macromastia 2021     Priority: Medium     Chest pain 10/04/2020     Priority: Medium     Gastroesophageal reflux disease without esophagitis 10/04/2020     Priority: Medium     Lung nodule 10/04/2020     Priority: Medium     Headaches due to old head trauma 01/15/2020     Priority: Medium     Injury of left shoulder 01/15/2020     Priority: Medium     Mild intermittent asthma without complication 2017     Priority: Medium      Past Medical History:    Past Medical History:   Diagnosis Date     Anemia      Anti-mi-2 antibody present      Breast cancer (H)      Gastroesophageal reflux disease      Mild intermittent asthma without complication      Obesity      Past Surgical History:    Past Surgical History:   Procedure Laterality Date     AS REDUCTION OF LARGE BREAST  2021      SECTION       CHOLECYSTECTOMY       right mastectomy  2021     Family History:    Family History   Problem Relation Age of Onset     No Known Problems Mother      Other - See Comments Father         murder at age 42     No Known Problems Sister      No Known Problems Sister      No Known Problems Brother      No Known Problems Brother      No  Known Problems Maternal Grandmother      No Known Problems Maternal Grandfather      No Known Problems Paternal Grandmother      No Known Problems Paternal Grandfather      Social History:  Marital Status:  Single [1]  Social History     Tobacco Use     Smoking status: Never Smoker     Smokeless tobacco: Never Used   Vaping Use     Vaping Use: Never used   Substance Use Topics     Alcohol use: Not Currently     Comment: very occasional-quit with pregnancy     Drug use: Never      Medications:    albuterol (PROAIR HFA/PROVENTIL HFA/VENTOLIN HFA) 108 (90 Base) MCG/ACT inhaler  ondansetron (ZOFRAN ODT) 8 MG ODT tab  ondansetron (ZOFRAN-ODT) 4 MG ODT tab  Prenatal Vit-Fe Fumarate-FA (PRENATAL MULTIVITAMIN W/IRON) 27-0.8 MG tablet      Review of Systems   All other systems reviewed and are negative.      PE   BP: (!) 118/92  Pulse: 108  Temp: 99  F (37.2  C)  Resp: 16  Weight: 107.5 kg (237 lb)  SpO2: 99 %  Physical Exam  Vitals reviewed.   Constitutional:       General: She is not in acute distress.     Appearance: She is well-developed.   HENT:      Head: Normocephalic and atraumatic.      Right Ear: External ear normal.      Left Ear: External ear normal.      Nose: Nose normal.      Mouth/Throat:      Mouth: Mucous membranes are moist.      Pharynx: Oropharynx is clear.   Eyes:      Extraocular Movements: Extraocular movements intact.      Conjunctiva/sclera: Conjunctivae normal.      Pupils: Pupils are equal, round, and reactive to light.   Cardiovascular:      Rate and Rhythm: Normal rate and regular rhythm.   Pulmonary:      Effort: Pulmonary effort is normal.   Musculoskeletal:         General: Normal range of motion.      Cervical back: Normal range of motion.   Skin:     General: Skin is warm and dry.   Neurological:      General: No focal deficit present.      Mental Status: She is alert and oriented to person, place, and time.   Psychiatric:         Behavior: Behavior normal.         ED COURSE and MDM   2047.  " The patient has headache.  This is her second day of symptoms.  She has some mild lightheadedness with it.  She was exposed to heat yesterday.  I suspect this heat exposure and some mild dehydration may be contributing to her symptoms.  She is requesting blood work to be done \"just to be safe.\"  She has a known history of breast cancer identification during an elective mastectomy.  She says, \"That change me.  I really cautious now.  Can you just do some blood work so I can feel better about this?\"  Blood work pending.  No indication for imaging.  No evidence for hypertension or preeclampsia.    2123.  The patient has mild anemia.  This is similar to previous.  No obvious source identified on history taking.  Further discussion with her primary clinic recommended.  Uncertain cause of headache as well.  Low concern for  severe underlying pathology requiring hospitalization or emergent consultation.  Patient agrees with the plan to follow-up.  She would like to be discharged home at this time.    LABS  Labs Ordered and Resulted from Time of ED Arrival to Time of ED Departure   BASIC METABOLIC PANEL - Abnormal       Result Value    Sodium 139      Potassium 3.9      Chloride 109      Carbon Dioxide (CO2) 25      Anion Gap 5      Urea Nitrogen 5 (*)     Creatinine 0.48 (*)     Calcium 8.8      Glucose 86      GFR Estimate >90     CBC WITH PLATELETS AND DIFFERENTIAL - Abnormal    WBC Count 7.9      RBC Count 3.80      Hemoglobin 10.8 (*)     Hematocrit 34.1 (*)     MCV 90      MCH 28.4      MCHC 31.7      RDW 14.2      Platelet Count 224      % Neutrophils 60      % Lymphocytes 27      % Monocytes 8      % Eosinophils 2      % Basophils 1      % Immature Granulocytes 2      NRBCs per 100 WBC 0      Absolute Neutrophils 4.8      Absolute Lymphocytes 2.1      Absolute Monocytes 0.6      Absolute Eosinophils 0.1      Absolute Basophils 0.0      Absolute Immature Granulocytes 0.2      Absolute NRBCs 0.0         IMAGING  " Images reviewed by me.  Radiology report also reviewed.  No orders to display       Procedures    Medications - No data to display      IMPRESSION       ICD-10-CM    1. Acute nonintractable headache, unspecified headache type  R51.9    2. Anemia, unspecified type  D64.9             Medication List      There are no discharge medications for this visit.                     William Hernandez MD  07/21/22 1374

## 2022-07-30 ENCOUNTER — HOSPITAL ENCOUNTER (OUTPATIENT)
Facility: CLINIC | Age: 32
End: 2022-07-30
Admitting: OBSTETRICS & GYNECOLOGY
Payer: COMMERCIAL

## 2022-07-30 ENCOUNTER — HOSPITAL ENCOUNTER (OUTPATIENT)
Facility: CLINIC | Age: 32
Discharge: HOME | End: 2022-07-30
Attending: OBSTETRICS & GYNECOLOGY | Admitting: OBSTETRICS & GYNECOLOGY
Payer: COMMERCIAL

## 2022-07-30 ENCOUNTER — HOSPITAL ENCOUNTER (EMERGENCY)
Facility: CLINIC | Age: 32
Discharge: STILL ADMITTED OR ADMINISTRATIVE | End: 2022-07-30
Payer: COMMERCIAL

## 2022-07-30 VITALS
DIASTOLIC BLOOD PRESSURE: 76 MMHG | SYSTOLIC BLOOD PRESSURE: 120 MMHG | TEMPERATURE: 98.5 F | HEART RATE: 101 BPM | RESPIRATION RATE: 16 BRPM

## 2022-07-30 PROBLEM — Z34.90 PREGNANCY: Status: ACTIVE | Noted: 2022-07-30

## 2022-07-30 LAB
ALBUMIN UR-MCNC: NEGATIVE MG/DL
APPEARANCE UR: CLEAR
BACTERIA #/AREA URNS HPF: ABNORMAL /HPF
BILIRUB UR QL STRIP: NEGATIVE
COLOR UR AUTO: ABNORMAL
GLUCOSE UR STRIP-MCNC: NEGATIVE MG/DL
HGB UR QL STRIP: NEGATIVE
KETONES UR STRIP-MCNC: NEGATIVE MG/DL
LEUKOCYTE ESTERASE UR QL STRIP: NEGATIVE
NITRATE UR QL: NEGATIVE
PH UR STRIP: 6 [PH] (ref 5–7)
RBC URINE: <1 /HPF
SP GR UR STRIP: 1 (ref 1–1.03)
SQUAMOUS EPITHELIAL: 2 /HPF
UROBILINOGEN UR STRIP-MCNC: NORMAL MG/DL
WBC URINE: 1 /HPF

## 2022-07-30 PROCEDURE — 81001 URINALYSIS AUTO W/SCOPE: CPT | Performed by: OBSTETRICS & GYNECOLOGY

## 2022-07-30 PROCEDURE — 59025 FETAL NON-STRESS TEST: CPT

## 2022-07-30 RX ORDER — PROCHLORPERAZINE 25 MG
25 SUPPOSITORY, RECTAL RECTAL EVERY 12 HOURS PRN
Status: DISCONTINUED | OUTPATIENT
Start: 2022-07-30 | End: 2022-07-30 | Stop reason: HOSPADM

## 2022-07-30 RX ORDER — METOCLOPRAMIDE HYDROCHLORIDE 5 MG/ML
10 INJECTION INTRAMUSCULAR; INTRAVENOUS EVERY 6 HOURS PRN
Status: DISCONTINUED | OUTPATIENT
Start: 2022-07-30 | End: 2022-07-30 | Stop reason: HOSPADM

## 2022-07-30 RX ORDER — ONDANSETRON 4 MG/1
4 TABLET, ORALLY DISINTEGRATING ORAL EVERY 6 HOURS PRN
Status: DISCONTINUED | OUTPATIENT
Start: 2022-07-30 | End: 2022-07-30 | Stop reason: HOSPADM

## 2022-07-30 RX ORDER — PROCHLORPERAZINE MALEATE 10 MG
10 TABLET ORAL EVERY 6 HOURS PRN
Status: DISCONTINUED | OUTPATIENT
Start: 2022-07-30 | End: 2022-07-30 | Stop reason: HOSPADM

## 2022-07-30 RX ORDER — ONDANSETRON 2 MG/ML
4 INJECTION INTRAMUSCULAR; INTRAVENOUS EVERY 6 HOURS PRN
Status: DISCONTINUED | OUTPATIENT
Start: 2022-07-30 | End: 2022-07-30 | Stop reason: HOSPADM

## 2022-07-30 RX ORDER — METOCLOPRAMIDE 10 MG/1
10 TABLET ORAL EVERY 6 HOURS PRN
Status: DISCONTINUED | OUTPATIENT
Start: 2022-07-30 | End: 2022-07-30 | Stop reason: HOSPADM

## 2022-07-30 NOTE — PROGRESS NOTES
Pt arrived to the [facility] at 1200 stating that she has been cramping all morning. Pt states she had some mucus discharge. No LOF or bleeding. Abd palpates soft, no ctx on the monitor. Dopler 150's. UA sent. Pt states that she is having regular BMs. Updated Dr. Woodard. Pt has an appointment on 8/3/22. Offered pt a belly binder. Pt states that she is going to PT and they gave her one which she has been wearing once in awhile. Instructed pt to take tylenol, warm bath, cold compresses. Discharge orders received and given to pt. Pt amb to car.

## 2022-07-30 NOTE — DISCHARGE INSTRUCTIONS
Discharge Instructions for Undelivered Patients      Diet:  Drink 8 to 12 glasses of water every day.  You may eat meals and snacks as before    Activity:    Rest often as needed.  Count fetal kicks every day.   Call your doctor if your baby is moving less than usual.    Medicines:  My care team has reviewed my medicines with me.  My care team has given me a list of my medicines.  My care team has prescribed a new medicine. They have either sent it home with me or ordered it from the pharmacy.    Call your provider if you notice:  Swelling in your face or increased swelling in your hands or legs.  Headaches that are not relieved by Tylenol (acetaminophen).  Changes in your vision (blurring; seeing spots or stars).  Nausea (sick to your stomach) and vomiting (throwing up).  Weight gain of 5 pounds per week.  Heartburn that doesn't go away.  Signs of bladder infection: pain when you urinate (use the toilet), needing to go more often or more urgently.  The bag of oseguera (membranes) breaks, or you notice leaking in your underwear.  Bright red blood in your underwear.  Abdominal (lower belly) or stomach pain.    For Second (plus) baby: Contractions (tightenings) less than 10 minutes apart and getting stronger.  Increase or change in vaginal discharge (note the color and amount).  Follow up with your provider as scheduled.

## 2022-08-01 NOTE — PROGRESS NOTES
S: Feels well today.  Baby active.  Denies uterine cramping, vaginal bleeding or leaking of fluid. Recent visit to inpatient triage for cramping and a visit for HA. Her labs at the visit for HA were normal. No signs of Pre-E. She was seen in triage for cramping and discharge. Sent home with reassurance. Her partner is in [place]. They are working with the doctor to discuss birth plans. Joesph has a history of c/s and they might have a repeat c/section. She has history of macrosomia in her last pregnancy.    O: Vitals: LMP 02/10/2022   BMI= Body mass index is 37.63 kg/m .  Exam:  Constitutional: healthy, alert and no distress  Respiratory: respirations even and unlabored  Gastrointestinal: Abdomen soft, non-tender. Fundus measures appropriate for gestational age. Fetal heart tones hear without difficulty and within normal limits  :   Psychiatric: mentation appears normal and affect normal/bright  (Z34.82) Prenatal care, subsequent pregnancy in second trimester  (primary encounter diagnosis)  Comment: repeat sono in two weeks.   Plan: CBC with platelets, Glucose tolerance gest         screen 1 hour, Treponema Abs w Reflex to RPR         and Titer, US OB >14 Weeks Follow Up       (Z98.891) History of   Comment:   Plan: Continue discussion with your MD.     (O09.299) History of macrosomia in infant in prior pregnancy, currently pregnant  Comment: S>D today with ultrasound showing baby in 95%   Plan: US OB >14 Weeks Follow Up      Sirisha Ruiz CNM

## 2022-08-03 ENCOUNTER — PRENATAL OFFICE VISIT (OUTPATIENT)
Dept: OBGYN | Facility: CLINIC | Age: 32
End: 2022-08-03
Payer: COMMERCIAL

## 2022-08-03 VITALS
RESPIRATION RATE: 16 BRPM | DIASTOLIC BLOOD PRESSURE: 67 MMHG | HEIGHT: 68 IN | SYSTOLIC BLOOD PRESSURE: 116 MMHG | HEART RATE: 104 BPM | WEIGHT: 247.5 LBS | BODY MASS INDEX: 37.51 KG/M2 | TEMPERATURE: 98.8 F

## 2022-08-03 DIAGNOSIS — O36.62X0 EXCESSIVE FETAL GROWTH AFFECTING MANAGEMENT OF PREGNANCY IN SECOND TRIMESTER, SINGLE OR UNSPECIFIED FETUS: ICD-10-CM

## 2022-08-03 DIAGNOSIS — Z98.891 HISTORY OF C-SECTION: ICD-10-CM

## 2022-08-03 DIAGNOSIS — O09.299 HISTORY OF MACROSOMIA IN INFANT IN PRIOR PREGNANCY, CURRENTLY PREGNANT: ICD-10-CM

## 2022-08-03 DIAGNOSIS — O24.410 DIET CONTROLLED GESTATIONAL DIABETES MELLITUS (GDM) IN SECOND TRIMESTER: ICD-10-CM

## 2022-08-03 DIAGNOSIS — Z34.82 PRENATAL CARE, SUBSEQUENT PREGNANCY IN SECOND TRIMESTER: Primary | ICD-10-CM

## 2022-08-03 PROBLEM — O24.419 GDM (GESTATIONAL DIABETES MELLITUS): Status: ACTIVE | Noted: 2022-08-03

## 2022-08-03 LAB
ERYTHROCYTE [DISTWIDTH] IN BLOOD BY AUTOMATED COUNT: 14.5 % (ref 10–15)
GLUCOSE 1H P 50 G GLC PO SERPL-MCNC: 229 MG/DL (ref 70–129)
HCT VFR BLD AUTO: 33.5 % (ref 35–47)
HGB BLD-MCNC: 10.4 G/DL (ref 11.7–15.7)
MCH RBC QN AUTO: 28.6 PG (ref 26.5–33)
MCHC RBC AUTO-ENTMCNC: 31 G/DL (ref 31.5–36.5)
MCV RBC AUTO: 92 FL (ref 78–100)
PLATELET # BLD AUTO: 195 10E3/UL (ref 150–450)
RBC # BLD AUTO: 3.64 10E6/UL (ref 3.8–5.2)
T PALLIDUM AB SER QL: NONREACTIVE
WBC # BLD AUTO: 7.1 10E3/UL (ref 4–11)

## 2022-08-03 PROCEDURE — 99207 PR PRENATAL VISIT: CPT | Performed by: ADVANCED PRACTICE MIDWIFE

## 2022-08-03 PROCEDURE — 86780 TREPONEMA PALLIDUM: CPT | Performed by: ADVANCED PRACTICE MIDWIFE

## 2022-08-03 PROCEDURE — 36415 COLL VENOUS BLD VENIPUNCTURE: CPT | Performed by: ADVANCED PRACTICE MIDWIFE

## 2022-08-03 PROCEDURE — 82950 GLUCOSE TEST: CPT | Performed by: ADVANCED PRACTICE MIDWIFE

## 2022-08-03 PROCEDURE — 85027 COMPLETE CBC AUTOMATED: CPT | Performed by: ADVANCED PRACTICE MIDWIFE

## 2022-08-03 NOTE — NURSING NOTE
"Initial /67 (BP Location: Right arm, Patient Position: Chair, Cuff Size: Adult Regular)   Pulse 104   Temp 98.8  F (37.1  C) (Tympanic)   Resp 16   Ht 1.727 m (5' 8\")   Wt 112.3 kg (247 lb 8 oz)   LMP 02/10/2022   BMI 37.63 kg/m   Estimated body mass index is 37.63 kg/m  as calculated from the following:    Height as of this encounter: 1.727 m (5' 8\").    Weight as of this encounter: 112.3 kg (247 lb 8 oz). .      "

## 2022-08-05 ENCOUNTER — VIRTUAL VISIT (OUTPATIENT)
Dept: EDUCATION SERVICES | Facility: CLINIC | Age: 32
End: 2022-08-05
Attending: ADVANCED PRACTICE MIDWIFE
Payer: COMMERCIAL

## 2022-08-05 DIAGNOSIS — Z98.891 HISTORY OF C-SECTION: ICD-10-CM

## 2022-08-05 DIAGNOSIS — O09.299 HISTORY OF MACROSOMIA IN INFANT IN PRIOR PREGNANCY, CURRENTLY PREGNANT: ICD-10-CM

## 2022-08-05 DIAGNOSIS — O24.410 DIET CONTROLLED GESTATIONAL DIABETES MELLITUS (GDM) IN SECOND TRIMESTER: Primary | ICD-10-CM

## 2022-08-05 DIAGNOSIS — O36.62X0 EXCESSIVE FETAL GROWTH AFFECTING MANAGEMENT OF PREGNANCY IN SECOND TRIMESTER, SINGLE OR UNSPECIFIED FETUS: ICD-10-CM

## 2022-08-05 PROCEDURE — G0108 DIAB MANAGE TRN  PER INDIV: HCPCS

## 2022-08-05 RX ORDER — LANCETS 33 GAUGE
1 EACH MISCELLANEOUS 4 TIMES DAILY
Qty: 200 EACH | Refills: 4 | Status: SHIPPED | OUTPATIENT
Start: 2022-08-05 | End: 2023-01-20

## 2022-08-05 RX ORDER — BLOOD-GLUCOSE METER
1 EACH MISCELLANEOUS 4 TIMES DAILY
Qty: 1 KIT | Refills: 0 | Status: SHIPPED | OUTPATIENT
Start: 2022-08-05 | End: 2023-01-20

## 2022-08-05 RX ORDER — BLOOD SUGAR DIAGNOSTIC
STRIP MISCELLANEOUS
Qty: 150 STRIP | Refills: 4 | Status: SHIPPED | OUTPATIENT
Start: 2022-08-05 | End: 2023-01-20

## 2022-08-05 NOTE — PATIENT INSTRUCTIONS
Your 1 week follow-up Diabetes Education visit is scheduled on Thursday, August 18th at 2:30pm- we will call you again around this time.    1. Check blood sugar 4 times a day, before breakfast and 1 hour after the start of each meal.   Blood glucose goal before breakfast: <95 mg/dL  1 hour after start of meals:  <140 mg/dL  2 hours after start of a meal: <120 mg/dL    Video for One Touch Verio Flex (may have to copy and paste in web browser):     [email or web address]/[email or web address]?[email or web address]#action=share     2. Check urine ketones when you wake up every morning for 7 days. If negative everyday, reduce testing to once a week.    3. Follow the recommended meal plan: eat something every 2-3 hours, include protein/fat and carbohydrate at every meal and snack, have 30-45 gm carbs at breakfast, 45-60 gm carbs at lunch, 45-60 gm carbs at supper, 15-30 gm carbs at 3 snacks per day.     Other tips:  -Minimum 175 gm carbohydrates a day recommended during pregnancy.  -Caution fruit, cereal and juice in the morning- may cause a high blood glucose.  -Good to eat protein with bedtime snack if having high fasting (before breakfast) blood glucose.  -We recommend keeping track of meals the next 1-2 weeks so you can send for us to review if you are having some higher blood glucose.    Helpful website: calorieking.com    4. Add activity to every day, try walking or being active after each meal to help control blood sugar levels.    5.Call or send a [employer] message to your educator if 3 or more blood sugars are above goal in 1 week, you have an elevated ketone result (trace or higher), or with questions or concerns.    How to attach a picture of your blood glucose:   To send a picture or file to diabetes education in [employer]:   Click on the messages icon/envelope  Click on the green Send a Message button  Choose Medical Question and then Updates About My Health  Pick your diabetes educator from your list  At the bottom left of the  message, click on the paper clip/attach button and choose Take a Photo OR Choose a File  Add text to the message, if you'd like, then send.     Shannan Benoit RDN, LD, [facility]   [phone]

## 2022-08-05 NOTE — PROGRESS NOTES
Type of Service: Telephone Visit    Originating Location (Patient Location): Home  Distant Location (Provider Location): Home  Mode of Communication:  Telephone    Telephone Visit Start Time: 8:00am  Telephone Visit End Time (telephone visit stop time): 9:00am    How would patient like to obtain AVS? Ceasr    Diabetes Self-Management Education & Support      SUBJECTIVE/OBJECTIVE:  Presents for education related to gestational diabetes.    Accompanied by: Self  Diabetes management related comments/concerns: Says this is all new for her and does not know anyone who had GDM.  Says prior to pregnancy, was walking more and eating healthy. Says she had to drink pop and eat a burger so was eating less healthy foods and feels this contributes to weight gain. Says prior to 3 weeks ago, had to take Zofran daily. Says nausea is better.  Says she had to eat more foods with sugar.    Allergy: kiwi  Lactose intolerance: milk/yogurt    Gestational weeks: 24w, 3d  Number of previous pregnancies: 2  Had any babies over 9 lbs: Yes (Last baby was >10 lbs)  Previously had Gestational Diabetes: No  Have you ever had thyroid problems or taken thyroid medication?: No  Heart disease, mitral valve prolapse or rheumatic fever?: No  Hypertension : No  High Cholesterol: No  High Triglycerides: No  Do you use tobacco products?: No  Do you drink beer, wine or hard liquor?: No (None during pregnancy.)    Cultural Influences/Ethnic Background:  Not  or       Estimated Date of Delivery: Nov 22, 2022    1 hour OGTT  Lab Results   Component Value Date    GLU1 229 (H) 08/03/2022         3 hour OGTT    Fasting  No results found for: GTTGF    1 hour  No results found for: GTTG1    2 hour  No results found for: GTTG2    3 hour  No results found for: GTTG3    Lifestyle and Health Behaviors:  Pre-pregnancy weight (lbs): 231  Exercise:: Yes (Trying to dance more.)  Days per week of moderate to strenuous exercise (like a brisk walk): 1  On  average, minutes per day of exercise at this level: 20  How intense was your typical exercise? : Light (like stretching or slow walking)  Exercise Minutes per Week: 20  Barrier to exercise: Other (Got weak from nausea during pregnancy.)  Cultural/Episcopal diet restrictions?: No  Meal planning/habits: Avoiding sweets  How many times a week on average do you eat food made away from home (restaurant/take-out)?: 0  Meals include: Breakfast, Lunch, Dinner, Afternoon Snack  Breakfast: Wakes: 8-10am: Will eat when wakes up. Monique Tea (plain) and rice with veggies and eggs OR green plantain -boiled and with butter OR eggs -fried or boiled  Lunch: 2-4pm: 1/2 cup Rice with meat and veggies  Dinner: 8-8:30pm: Fish, 1/2 cup rice and greens/green beans/aspargus/mushroom  Snacks: None- unable to eat OR rice OR plaintain  Other: Bed: 10-11am  Beverages: Water, Tea  Biggest challenges to healthy eating: Other (Not tolerating much food)  Pre- vitamin?: Yes  Supplements?: No  Experiencing nausea?: Yes  Experiencing heartburn?: Yes (at night)    Healthy Coping:  Emotional response to diabetes: Ready to learn  Informal Support system:: Family  Stage of change: PREPARATION (Decided to change - considering how)    Current Management:  Taking medications for gestational diabetes?: No    ASSESSMENT:  Reviewed target blood glucose values, sharps disposal, diagnosis criteria for GDM and importance of good blood glucose management for health of mom and baby. Patient advised to call if 3 blood glucose elevated before returns next week. Instructed on ketone checking and told to call if unable to get ketones negative.      Discussed carbohydrate sources and impact on blood glucose. Reviewed basics of healthy eating and incorporating a variety of foods into meal plan. Instructed on carbohydrate counting and label reading and recommended patient consume 2-3 CHO for breakfast, 3-4 CHO for lunch and dinner and 1-2 CHO for each snack, 3 snacks  a day.      Discussed importance of not going too low in carbohydrates since that may cause liver to produce excess glucose and contribute to elevated blood glucose readings and ketone formation. Encouraged eating breakfast within 1 hour of waking.  To also help prevent against ketone formation, protein was encouraged with meals and snacks, especially with the night snack. Reviewed benefits of exercising to help lower blood glucose and walking after meals, as tolerated and per MD approval, if seeing elevated blood glucose after a meal. Pt verbalized understanding of concepts discussed and recommendations provided.        INTERVENTION:  Patient was ordered the One Touch Verio Flex meter and provided brief overview on use.  Will send video in [employer] on how to use.    Educational topics covered today:  GDM diagnosis, pathophysiology, Risks and Complications of GDM, Means of controlling GDM, Using a Blood Glucose Monitor, Blood Glucose Goals, Logging and Interpreting Glucose Results, Ketone Testing, When to Call a Diabetes Educator or OB Provider, Healthy Eating During Pregnancy, Counting Carbohydrates, Meal Planning for GDM, and Physical Activity    Educational materials provided today:   V[facility] Gestational Diabetes  GDM Log Book  Sharps Disposal  (email received and requested handouts mailed since not have a printer)    Pt verbalized understanding of concepts discussed and recommendations provided today.     PLAN:  Check glucose 4 times daily, before breakfast and 1 hour after each meal.     Check Ketones daily for one week, if negative, reduce testing to once a week.     Physical activity recommended: after meals if blood glucose high, as tolerated and per MD approval.    Meal plan: 2-3 carbs at breakfast, 3-4 carbs at lunch, 3-4 carbs at supper, 1-2 carbs at 3 snacks a day.  Follow consistent CHO meal plan, eat CHO and protein/fat at all meals/snacks.    Call/e-mail/[employer] message diabetes educator  if 3 or more blood sugars are above the goal in 1 week, if ketones are positive, or with questions/concerns.    Shannan Benoit RDN, [place], [facility]   Time Spent: 60 minutes  Encounter Type: Individual telephone visit    Any diabetes medication dose changes were made via the CDE Protocol and Collaborative Practice Agreement with the patient's referring provider. A copy of this encounter was shared with the provider.

## 2022-08-05 NOTE — LETTER
8/5/2022         RE: Brandee Nesbitt  [id number] [place] [address]        Dear Colleague,    Thank you for referring your patient, Brandee Nesbitt, to the [facility]. Please see a copy of my visit note below.    Type of Service: Telephone Visit    Originating Location (Patient Location): Home  Distant Location (Provider Location): Home  Mode of Communication:  Telephone    Telephone Visit Start Time: 8:00am  Telephone Visit End Time (telephone visit stop time): 9:00am    How would patient like to obtain AVS? MyChart    Diabetes Self-Management Education & Support      SUBJECTIVE/OBJECTIVE:  Presents for education related to gestational diabetes.    Accompanied by: Self  Diabetes management related comments/concerns: Says this is all new for her and does not know anyone who had GDM.  Says prior to pregnancy, was walking more and eating healthy. Says she had to drink pop and eat a burger so was eating less healthy foods and feels this contributes to weight gain. Says prior to 3 weeks ago, had to take Zofran daily. Says nausea is better.  Says she had to eat more foods with sugar.    Allergy: kiwi  Lactose intolerance: milk/yogurt    Gestational weeks: 24w, 3d  Number of previous pregnancies: 2  Had any babies over 9 lbs: Yes (Last baby was >10 lbs)  Previously had Gestational Diabetes: No  Have you ever had thyroid problems or taken thyroid medication?: No  Heart disease, mitral valve prolapse or rheumatic fever?: No  Hypertension : No  High Cholesterol: No  High Triglycerides: No  Do you use tobacco products?: No  Do you drink beer, wine or hard liquor?: No (None during pregnancy.)    Cultural Influences/Ethnic Background:  Not  or       Estimated Date of Delivery: Nov 22, 2022    1 hour OGTT  Lab Results   Component Value Date    GLU1 229 (H) 08/03/2022         3 hour OGTT    Fasting  No results found for: GTTGF    1 hour  No results found for:  GTTG1    2 hour  No results found for: GTTG2    3 hour  No results found for: GTTG3    Lifestyle and Health Behaviors:  Pre-pregnancy weight (lbs): 231  Exercise:: Yes (Trying to dance more.)  Days per week of moderate to strenuous exercise (like a brisk walk): 1  On average, minutes per day of exercise at this level: 20  How intense was your typical exercise? : Light (like stretching or slow walking)  Exercise Minutes per Week: 20  Barrier to exercise: Other (Got weak from nausea during pregnancy.)  Cultural/Sikhism diet restrictions?: No  Meal planning/habits: Avoiding sweets  How many times a week on average do you eat food made away from home (restaurant/take-out)?: 0  Meals include: Breakfast, Lunch, Dinner, Afternoon Snack  Breakfast: Wakes: 8-10am: Will eat when wakes up. Monique Tea (plain) and rice with veggies and eggs OR green plantain -boiled and with butter OR eggs -fried or boiled  Lunch: 2-4pm: 1/2 cup Rice with meat and veggies  Dinner: 8-8:30pm: Fish, 1/2 cup rice and greens/green beans/aspargus/mushroom  Snacks: None- unable to eat OR rice OR plaintain  Other: Bed: 10-11am  Beverages: Water, Tea  Biggest challenges to healthy eating: Other (Not tolerating much food)  Pre- vitamin?: Yes  Supplements?: No  Experiencing nausea?: Yes  Experiencing heartburn?: Yes (at night)    Healthy Coping:  Emotional response to diabetes: Ready to learn  Informal Support system:: Family  Stage of change: PREPARATION (Decided to change - considering how)    Current Management:  Taking medications for gestational diabetes?: No    ASSESSMENT:  Reviewed target blood glucose values, sharps disposal, diagnosis criteria for GDM and importance of good blood glucose management for health of mom and baby. Patient advised to call if 3 blood glucose elevated before returns next week. Instructed on ketone checking and told to call if unable to get ketones negative.      Discussed carbohydrate sources and impact on blood  glucose. Reviewed basics of healthy eating and incorporating a variety of foods into meal plan. Instructed on carbohydrate counting and label reading and recommended patient consume 2-3 CHO for breakfast, 3-4 CHO for lunch and dinner and 1-2 CHO for each snack, 3 snacks a day.      Discussed importance of not going too low in carbohydrates since that may cause liver to produce excess glucose and contribute to elevated blood glucose readings and ketone formation. Encouraged eating breakfast within 1 hour of waking.  To also help prevent against ketone formation, protein was encouraged with meals and snacks, especially with the night snack. Reviewed benefits of exercising to help lower blood glucose and walking after meals, as tolerated and per MD approval, if seeing elevated blood glucose after a meal. Pt verbalized understanding of concepts discussed and recommendations provided.        INTERVENTION:  Patient was ordered the One Touch Verio Flex meter and provided brief overview on use.  Will send video in [employer] on how to use.    Educational topics covered today:  GDM diagnosis, pathophysiology, Risks and Complications of GDM, Means of controlling GDM, Using a Blood Glucose Monitor, Blood Glucose Goals, Logging and Interpreting Glucose Results, Ketone Testing, When to Call a Diabetes Educator or OB Provider, Healthy Eating During Pregnancy, Counting Carbohydrates, Meal Planning for GDM, and Physical Activity    Educational materials provided today:   Vu Understanding Gestational Diabetes  GDM Log Book  Sharps Disposal  (email received and requested handouts mailed since not have a printer)    Pt verbalized understanding of concepts discussed and recommendations provided today.     PLAN:  Check glucose 4 times daily, before breakfast and 1 hour after each meal.     Check Ketones daily for one week, if negative, reduce testing to once a week.     Physical activity recommended: after meals if blood glucose  high, as tolerated and per MD approval.    Meal plan: 2-3 carbs at breakfast, 3-4 carbs at lunch, 3-4 carbs at supper, 1-2 carbs at 3 snacks a day.  Follow consistent CHO meal plan, eat CHO and protein/fat at all meals/snacks.    Call/e-mail/[employer] message diabetes educator if 3 or more blood sugars are above the goal in 1 week, if ketones are positive, or with questions/concerns.    Shannan Benoit RDN, [place], [facility]   Time Spent: 60 minutes  Encounter Type: Individual telephone visit    Any diabetes medication dose changes were made via the CDE Protocol and Collaborative Practice Agreement with the patient's referring provider. A copy of this encounter was shared with the provider.

## 2022-08-12 ENCOUNTER — MYC MEDICAL ADVICE (OUTPATIENT)
Dept: EDUCATION SERVICES | Facility: CLINIC | Age: 32
End: 2022-08-12

## 2022-08-12 ENCOUNTER — TELEPHONE (OUTPATIENT)
Dept: EDUCATION SERVICES | Facility: CLINIC | Age: 32
End: 2022-08-12

## 2022-08-12 ENCOUNTER — VIRTUAL VISIT (OUTPATIENT)
Dept: EDUCATION SERVICES | Facility: CLINIC | Age: 32
End: 2022-08-12
Payer: COMMERCIAL

## 2022-08-12 DIAGNOSIS — O24.419 GESTATIONAL DIABETES MELLITUS: Primary | ICD-10-CM

## 2022-08-12 DIAGNOSIS — O24.414 INSULIN CONTROLLED GESTATIONAL DIABETES MELLITUS (GDM) IN SECOND TRIMESTER: Primary | ICD-10-CM

## 2022-08-12 PROCEDURE — G0108 DIAB MANAGE TRN  PER INDIV: HCPCS | Mod: AE

## 2022-08-12 NOTE — PATIENT INSTRUCTIONS
"Taking Insulin:    1. Take NPH (Humulin-N or Novolin-N) - 22 units at bedtime.     - Rotate injection sites, keeping at least 1 inch apart from last injection site and 2 inches away from belly button or surgical scars.    -If you have a cloudy insulin, mix the insulin gently by rolling between your hands 10 times and turning upside down and right side up 10 times. Do not shake.     2. Pen - Use a new pen needle for each injection. Always remove pen needle from the insulin pen after use and do not store insulin pens with the needle on the pen. Do a 2 unit \"prime\" before each injection, be sure a drop or stream of insulin comes out of the needle before you give your injection. After you inject, hold the needle under the skin to the count of 10 to be sure all of the insulin goes in.      3. Store insulin you are not using in the refrigerator (do not freeze). Take new insulin out of the refrigerator a few hours prior to use to bring to room temperature.     4. Once opened NPH Vial - Humulin N can be kept at room temperature for 28 days. Do not use the opened insulin after this time has passed, even if there is still medicine inside.     5. Always carry your blood sugar meter and a sugar source like glucose tablets with you in case of a low blood sugar. Treat a low blood sugar (less than 70) with 15 grams of carbohydrate (1 carb choice). Wait 15 minutes, recheck blood sugar. If blood sugar is still below 70, repeat the treatment.    6. Wear Medical ID or carry a wallet card stating you have Diabetes.    7. Call your doctor or diabetes educator if you begin having low blood sugars or if you have questions or concerns.     8. Complete and mail in the form to inform the [place] [employer] that you have started taking insulin.    9. Follow-up: Follow-up diabetes education appointment scheduled on August 18th, 2:30- telephone.    [facility] for [facility] " Area:  For Your Diabetes Education or Nutrition Appointments Call:  [phone]   For Diabetes or Nutrition Related Questions:   Phone: [phone]  Send Cloud Content Message   If you need a medication refill please contact your pharmacy. Please allow 3 business days for your refills to be completed.

## 2022-08-12 NOTE — PROGRESS NOTES
Diabetes Education Follow-up  Telephone visit: 30 min  Subjective/Objective:    Brandee Nesbitt sent in blood glucose log for review.  BG have been elevated above goal for the last week.  BG as high ast 160-180 mg/dL.  Recommend insulin start; NPH 0.2 units/kg/day based on elevation of BG.    Diabetes is being managed with Lifestyle (diet/activity).  Recommending insulin start today.       Assessment:        Reviewed benefits of starting insulin, answered questions.  Thoroughly reviewed starting insulin using kwikpen, hygiene, administration and hypoglycemia.  Will follow up with Joesph in one week.    Plan/Response:  See Patient Instructions for co-developed, patient-stated behavior change goals.    Court Gunderson RDN, LD  Outpatient Diabetes Education  Adult Diabetes Education Triage [phone]      Any diabetes medication dose changes were made via the CDE Protocol and Collaborative Practice Agreement with the patient's OB/GYN provider. A copy of this encounter was shared with the provider.

## 2022-08-12 NOTE — LETTER
8/12/2022         RE: Brandee Nesbitt  [address] [place] [address]        Dear Colleague,    Thank you for referring your patient, Brandee Nesbitt, to the [facility] DIABETES EDUC[facility] [place]. Please see a copy of my visit note below.    Diabetes Education Follow-up  Telephone visit: 30 min  Subjective/Objective:    Brandee Nesbitt sent in blood glucose log for review.  BG have been elevated above goal for the last week.  BG as high ast 160-180 mg/dL.  Recommend insulin start; NPH 0.2 units/kg/day based on elevation of BG.    Diabetes is being managed with Lifestyle (diet/activity).  Recommending insulin start today.       Assessment:        Reviewed benefits of starting insulin, answered questions.  Thoroughly reviewed starting insulin using kwikpen, hygiene, administration and hypoglycemia.  Will follow up with Joesph in one week.    Plan/Response:  See Patient Instructions for co-developed, patient-stated behavior change goals.    Court Gunderson RDN, LD  Outpatient Diabetes Education  Adult Diabetes Education Triage [phone]      Any diabetes medication dose changes were made via the CDE Protocol and Collaborative Practice Agreement with the patient's OB/GYN provider. A copy of this encounter was shared with the provider.

## 2022-08-15 NOTE — CONFIDENTIAL NOTE
Cesar response:  Joesph,    Thank you so much for reaching out.  I am one of Court's coworkers.  It looks like we are still waiting for your OB/GYN provider to sign off on the insulin prescription.  This should happen today.  Once the prescription is signed it will be sent to your pharmacy to be filled.  Please keep us posted if you have not heard anything by this afternoon.  While insulin can cause low blood sugars we do not expect this to happen.  We start at a small dose of insulin based on your weight in order to prevent this.  We then increase the insulin in small doses every few days until blood sugars are mainly in goal range. The reason you need to take the insulin at bedtime is because it works in your body for about 12 hours and we need it to help with the high morning blood sugars.  The goal of the insulin is to lower your blood sugar levels, but not necessarily cause them to go low(under 70).    Please let us know if you have any other questions.  Thanks!    Teena Pang RN, [facility]

## 2022-08-16 RX ORDER — PEN NEEDLE, DIABETIC 32GX 5/32"
NEEDLE, DISPOSABLE MISCELLANEOUS
Qty: 50 EACH | Refills: 3 | Status: SHIPPED | OUTPATIENT
Start: 2022-08-16 | End: 2023-01-20

## 2022-08-16 RX ORDER — GLUCOSAMINE HCL/CHONDROITIN SU 500-400 MG
CAPSULE ORAL
Qty: 100 EACH | Refills: 3 | Status: SHIPPED | OUTPATIENT
Start: 2022-08-16 | End: 2023-01-20

## 2022-08-17 ENCOUNTER — HOSPITAL ENCOUNTER (OUTPATIENT)
Dept: ULTRASOUND IMAGING | Facility: CLINIC | Age: 32
Discharge: HOME | End: 2022-08-17
Attending: ADVANCED PRACTICE MIDWIFE | Admitting: ADVANCED PRACTICE MIDWIFE
Payer: COMMERCIAL

## 2022-08-17 DIAGNOSIS — Z34.82 PRENATAL CARE, SUBSEQUENT PREGNANCY IN SECOND TRIMESTER: ICD-10-CM

## 2022-08-17 DIAGNOSIS — O09.299 HISTORY OF MACROSOMIA IN INFANT IN PRIOR PREGNANCY, CURRENTLY PREGNANT: ICD-10-CM

## 2022-08-17 PROCEDURE — 76816 OB US FOLLOW-UP PER FETUS: CPT

## 2022-08-18 ENCOUNTER — VIRTUAL VISIT (OUTPATIENT)
Dept: EDUCATION SERVICES | Facility: CLINIC | Age: 32
End: 2022-08-18
Payer: COMMERCIAL

## 2022-08-18 DIAGNOSIS — O24.414 INSULIN CONTROLLED GESTATIONAL DIABETES MELLITUS (GDM) IN SECOND TRIMESTER: Primary | ICD-10-CM

## 2022-08-18 PROCEDURE — 98967 PH1 ASSMT&MGMT NQHP 11-20: CPT | Mod: 95

## 2022-08-18 NOTE — PROGRESS NOTES
"Diabetes and Pregnancy Follow-up  Type of Service: Telephone Visit    How would patient like to obtain AVS? Not needed    Subjective/Objective:    Brandee Nesbitt was called for a scheduled BG review. Last date of communication was: 8/12/2022.    Gestational diabetes is being managed with diet, activity and medications    Taking diabetes medications:   yes:     Diabetes Medication(s)     Insulin       insulin  UNIT/ML injection    22 units once per day at bedtime.          Estimated Date of Delivery: Nov 22, 2022    Blood Glucose/Ketone Log:    Date Ketones Fasting Post Breakfast Post Lunch Post Supper   8/12  120 131 99 198 (rice)   8/13  96 110 116 141   8/14  73 105 140 136   8/15  105 141 114 92   8/16  100 164 102 89   8/17  82 135 130 95   8/18  92 185 104      Just got insulin yesterday --> delay in getting it ordered and sent to pharmacy, did not take insulin last night  Feels rice makes her number go up, wants to try quinoa    Breakfast this morning -- egg, avocado, wheat bread, water  Does not have a bedtime snack each night      Assessment:    Ketones: na  Fasting blood glucoses: 42% in target.  After breakfast: 71% in target.  After lunch: 100% in target.  After dinner: 50% in target.    Fasting numbers are quite bouncy, despite reporting never eating after dinner each night. She has been exercising more often, and feels this may be helping as well. Ok with her holding insulin for now given lower fasting numbers x2 days, but want her to try to incorporate a bedtime snack. Reviewed options, and she prefers to have lists sent to her via [employer]. Did also have a few questions regarding insulin (\"do I still take if if my number looks good at night?\").     Plan/Response:  Follow-up on Monday.  Hold insulin until review on [employer] Monday    MARK ANTHONY Padilla [facility]  Time Spent: 18:57 minutes    Any diabetes medication dose changes were made via the CDE Protocol and Collaborative Practice " Agreement with the patient's OB/GYN provider. A copy of this encounter was shared with the provider.

## 2022-08-20 DIAGNOSIS — O28.3 ABNORMAL ULTRASONIC FINDING ON ANTENATAL SCREENING OF MOTHER: Primary | ICD-10-CM

## 2022-08-22 ENCOUNTER — PRE VISIT (OUTPATIENT)
Dept: MATERNAL FETAL MEDICINE | Facility: CLINIC | Age: 32
End: 2022-08-22

## 2022-08-22 ENCOUNTER — TRANSCRIBE ORDERS (OUTPATIENT)
Dept: MATERNAL FETAL MEDICINE | Facility: HOSPITAL | Age: 32
End: 2022-08-22

## 2022-08-22 DIAGNOSIS — O26.90 PREGNANCY RELATED CONDITION, ANTEPARTUM: Primary | ICD-10-CM

## 2022-08-25 ENCOUNTER — PRENATAL OFFICE VISIT (OUTPATIENT)
Dept: OBGYN | Facility: CLINIC | Age: 32
End: 2022-08-25
Payer: COMMERCIAL

## 2022-08-25 ENCOUNTER — MYC MEDICAL ADVICE (OUTPATIENT)
Dept: EDUCATION SERVICES | Facility: CLINIC | Age: 32
End: 2022-08-25

## 2022-08-25 VITALS
SYSTOLIC BLOOD PRESSURE: 107 MMHG | WEIGHT: 248.2 LBS | TEMPERATURE: 97.3 F | DIASTOLIC BLOOD PRESSURE: 64 MMHG | RESPIRATION RATE: 16 BRPM | HEART RATE: 105 BPM | HEIGHT: 68 IN | BODY MASS INDEX: 37.62 KG/M2

## 2022-08-25 DIAGNOSIS — R11.0 NAUSEA: ICD-10-CM

## 2022-08-25 DIAGNOSIS — Z34.83 ENCOUNTER FOR SUPERVISION OF OTHER NORMAL PREGNANCY IN THIRD TRIMESTER: Primary | ICD-10-CM

## 2022-08-25 PROCEDURE — 99207 PR PRENATAL VISIT: CPT | Performed by: OBSTETRICS & GYNECOLOGY

## 2022-08-25 RX ORDER — ONDANSETRON 4 MG/1
4 TABLET, ORALLY DISINTEGRATING ORAL EVERY 8 HOURS PRN
Qty: 20 TABLET | Refills: 0 | Status: SHIPPED | OUTPATIENT
Start: 2022-08-25 | End: 2023-01-20

## 2022-08-25 NOTE — TELEPHONE ENCOUNTER
Gestational Diabetes Follow-up    Subjective/Objective:    Brandee Nesbitt sent in blood glucose log for review. Last date of communication was: 8/18.    Gestational diabetes is being managed with diet and activity    Taking diabetes medications:   yes:     Diabetes Medication(s)     Insulin       insulin  UNIT/ML injection    22 units once per day at bedtime.     Patient not taking: Reported on 8/25/2022      Has not yet started     Estimated Date of Delivery: Nov 22, 2022    BG/Food Log:         Assessment:  Fasting blood sugars continue to be inconsistent, but recommend starting insulin.  Since patient has lows, recommend starting at 0-0-0-18 units, then increase to 0-0-0-22 if elevated after 3 days.     Ketones: negative.   Fasting blood glucoses: 63% in target.  After breakfast: 38% in target.  After lunch: 100% in target.  After dinner: 43% in target.    Plan/Response:  Follow-up on Monday.    Taylor Martines MS, RD, LD, CDE      Any diabetes medication dose changes were made via the CDE Protocol and Collaborative Practice Agreement with the patient's OB/GYN provider. A copy of this encounter was shared with the provider.

## 2022-08-25 NOTE — PROGRESS NOTES
Concerns: considering a repeat CS @ 39 weeks- if scheduled would allow her S.O. to return from [place] for the delivery.  She is taking Pepcid  as needed for GERD  She has recurrence of nausea and would like a refill of Zofran  She has GDM A1 and controls her BS by increasing her exercise   No vaginal bleeding, no contractions, no leakage of fluid  No nausea/vomiting. No heartburn  No vaginal discharge. No dysuria.   No headache, vision changes, lower extremity swelling, upper abdominal pain, chest pain, shortness of breath  Tdap planned next visit  Discussed PTL, PROM, and when to call or come in.  Incomplete  anatomy ultrasound Ventricular outflow tracts not well seen. She is returning to [facility] for follow up ULTRASOUND   RTC 2-3 weeks.         Jonathan Schmidt MD

## 2022-08-29 ENCOUNTER — HOSPITAL ENCOUNTER (OUTPATIENT)
Dept: ULTRASOUND IMAGING | Facility: CLINIC | Age: 32
Discharge: HOME | End: 2022-08-29
Attending: ADVANCED PRACTICE MIDWIFE
Payer: COMMERCIAL

## 2022-08-29 ENCOUNTER — OFFICE VISIT (OUTPATIENT)
Dept: MATERNAL FETAL MEDICINE | Facility: CLINIC | Age: 32
End: 2022-08-29
Attending: ADVANCED PRACTICE MIDWIFE
Payer: COMMERCIAL

## 2022-08-29 DIAGNOSIS — O24.414 INSULIN CONTROLLED GESTATIONAL DIABETES MELLITUS (GDM) DURING PREGNANCY, ANTEPARTUM: ICD-10-CM

## 2022-08-29 DIAGNOSIS — O26.90 PREGNANCY RELATED CONDITION, ANTEPARTUM: ICD-10-CM

## 2022-08-29 DIAGNOSIS — O35.9XX0 FETAL ABNORMALITY AFFECTING MANAGEMENT OF MOTHER, ANTEPARTUM, SINGLE OR UNSPECIFIED FETUS: Primary | ICD-10-CM

## 2022-08-29 PROCEDURE — 76811 OB US DETAILED SNGL FETUS: CPT

## 2022-08-29 PROCEDURE — 76811 OB US DETAILED SNGL FETUS: CPT | Mod: 26 | Performed by: OBSTETRICS & GYNECOLOGY

## 2022-08-29 NOTE — PROGRESS NOTES
Please see full imaging report from [employer] program under imaging tab.      Tonya Slade MD  Maternal Fetal Medicine

## 2022-09-02 ENCOUNTER — MYC MEDICAL ADVICE (OUTPATIENT)
Dept: FAMILY MEDICINE | Facility: CLINIC | Age: 32
End: 2022-09-02

## 2022-09-02 ENCOUNTER — OFFICE VISIT (OUTPATIENT)
Dept: FAMILY MEDICINE | Facility: CLINIC | Age: 32
End: 2022-09-02
Payer: COMMERCIAL

## 2022-09-02 VITALS
WEIGHT: 248 LBS | HEART RATE: 105 BPM | DIASTOLIC BLOOD PRESSURE: 70 MMHG | TEMPERATURE: 97.7 F | HEIGHT: 68 IN | RESPIRATION RATE: 16 BRPM | SYSTOLIC BLOOD PRESSURE: 102 MMHG | BODY MASS INDEX: 37.59 KG/M2 | OXYGEN SATURATION: 99 %

## 2022-09-02 DIAGNOSIS — J45.20 MILD INTERMITTENT ASTHMA WITHOUT COMPLICATION: ICD-10-CM

## 2022-09-02 DIAGNOSIS — R91.8 PULMONARY NODULES: ICD-10-CM

## 2022-09-02 DIAGNOSIS — O24.414 INSULIN CONTROLLED GESTATIONAL DIABETES MELLITUS (GDM) IN THIRD TRIMESTER: Primary | ICD-10-CM

## 2022-09-02 PROCEDURE — 99214 OFFICE O/P EST MOD 30 MIN: CPT | Performed by: NURSE PRACTITIONER

## 2022-09-02 ASSESSMENT — ASTHMA QUESTIONNAIRES
QUESTION_5 LAST FOUR WEEKS HOW WOULD YOU RATE YOUR ASTHMA CONTROL: COMPLETELY CONTROLLED
QUESTION_1 LAST FOUR WEEKS HOW MUCH OF THE TIME DID YOUR ASTHMA KEEP YOU FROM GETTING AS MUCH DONE AT WORK, SCHOOL OR AT HOME: NONE OF THE TIME
ACT_TOTALSCORE: 24
QUESTION_3 LAST FOUR WEEKS HOW OFTEN DID YOUR ASTHMA SYMPTOMS (WHEEZING, COUGHING, SHORTNESS OF BREATH, CHEST TIGHTNESS OR PAIN) WAKE YOU UP AT NIGHT OR EARLIER THAN USUAL IN THE MORNING: NOT AT ALL
QUESTION_2 LAST FOUR WEEKS HOW OFTEN HAVE YOU HAD SHORTNESS OF BREATH: NOT AT ALL
QUESTION_4 LAST FOUR WEEKS HOW OFTEN HAVE YOU USED YOUR RESCUE INHALER OR NEBULIZER MEDICATION (SUCH AS ALBUTEROL): ONCE A WEEK OR LESS
ACT_TOTALSCORE: 24

## 2022-09-02 ASSESSMENT — PAIN SCALES - GENERAL: PAINLEVEL: NO PAIN (0)

## 2022-09-02 NOTE — PROGRESS NOTES
"  Assessment & Plan     Insulin controlled gestational diabetes mellitus (GDM) in third trimester  -following OB and diabetic educator, states glucose readings improved  -reports nausea after taking prenatal multivitamins. I advised patient that this can be due to iron in multivitamins, she has history of anemia, recommended to try SloFE iron supplement, its usually have less GI side effects and start iron free prenatal multivitamins   - ferrous sulfate (SLO-FE) 142 (45 Fe) MG CR tablet; Take 1 tablet (142 mg) by mouth daily    Pulmonary nodules  -recommended to repeat chest CT in 3-4 months, after she deliver her baby   - CT Chest w/o Contrast; Future    Asthma  -well controlled currently    MALINI Tian CNP  [facility] JULIA Pink is a 32 year old pleasant female patient who is 28 weeks pregnant, pregnancy complicated by gestational diabetes, patient presenting today to establish care      History of Present Illness       Reason for visit:  To establish primary care    She eats 2-3 servings of fruits and vegetables daily.She consumes 0 sweetened beverage(s) daily.She exercises with enough effort to increase her heart rate 30 to 60 minutes per day.  She exercises with enough effort to increase her heart rate 5 days per week. She is missing 2 dose(s) of medications per week.         Review of Systems   Constitutional, HEENT, cardiovascular, pulmonary, gi and gu systems are negative, except as otherwise noted.      Objective    /70 (BP Location: Left arm, Patient Position: Sitting, Cuff Size: Adult Large)   Pulse 105   Temp 97.7  F (36.5  C) (Tympanic)   Resp 16   Ht 1.727 m (5' 8\")   Wt 112.5 kg (248 lb)   LMP 02/10/2022   SpO2 99%   BMI 37.71 kg/m    Body mass index is 37.71 kg/m .  Physical Exam   GENERAL: healthy, alert and no distress  EYES: Eyes grossly normal to inspection, PERRL and conjunctivae and sclerae normal  RESP: lungs clear to auscultation - no " rales, rhonchi or wheezes  CV: regular rate and rhythm, normal S1 S2, no S3 or S4, no murmur, click or rub, no peripheral edema and peripheral pulses strong  NEURO: Normal strength and tone, mentation intact and speech normal  PSYCH: mentation appears normal, affect normal/bright

## 2022-09-02 NOTE — PATIENT INSTRUCTIONS
Can try SloFE 45 mg iron, extended release iron usually better tolerated by patients.  Continue multivitamins   Repeat chest CT scan in 3 months, or after you deliver your baby

## 2022-09-02 NOTE — TELEPHONE ENCOUNTER
Routed to provider.  Please see MyChart message from pt regarding iron and folic acid supplementation.  Danae Longoria RN

## 2022-09-06 ENCOUNTER — PRENATAL OFFICE VISIT (OUTPATIENT)
Dept: OBGYN | Facility: CLINIC | Age: 32
End: 2022-09-06
Payer: COMMERCIAL

## 2022-09-06 VITALS
HEIGHT: 68 IN | WEIGHT: 247.5 LBS | HEART RATE: 109 BPM | SYSTOLIC BLOOD PRESSURE: 110 MMHG | DIASTOLIC BLOOD PRESSURE: 65 MMHG | RESPIRATION RATE: 18 BRPM | BODY MASS INDEX: 37.51 KG/M2 | TEMPERATURE: 98.6 F

## 2022-09-06 DIAGNOSIS — Z3A.29 29 WEEKS GESTATION OF PREGNANCY: Primary | ICD-10-CM

## 2022-09-06 DIAGNOSIS — Z23 NEED FOR PROPHYLACTIC VACCINATION AND INOCULATION AGAINST INFLUENZA: ICD-10-CM

## 2022-09-06 DIAGNOSIS — Z34.83 ENCOUNTER FOR SUPERVISION OF OTHER NORMAL PREGNANCY IN THIRD TRIMESTER: ICD-10-CM

## 2022-09-06 PROCEDURE — 90471 IMMUNIZATION ADMIN: CPT | Performed by: OBSTETRICS & GYNECOLOGY

## 2022-09-06 PROCEDURE — 90472 IMMUNIZATION ADMIN EACH ADD: CPT | Performed by: OBSTETRICS & GYNECOLOGY

## 2022-09-06 PROCEDURE — 90715 TDAP VACCINE 7 YRS/> IM: CPT | Performed by: OBSTETRICS & GYNECOLOGY

## 2022-09-06 PROCEDURE — 90686 IIV4 VACC NO PRSV 0.5 ML IM: CPT | Performed by: OBSTETRICS & GYNECOLOGY

## 2022-09-06 PROCEDURE — 99207 PR PRENATAL VISIT: CPT | Performed by: OBSTETRICS & GYNECOLOGY

## 2022-09-06 RX ORDER — MULTIPLE VITAMINS W/ MINERALS TAB 9MG-400MCG
1 TAB ORAL DAILY
COMMUNITY
End: 2023-01-20

## 2022-09-06 NOTE — NURSING NOTE
"Initial /65 (BP Location: Left arm, Patient Position: Chair, Cuff Size: Adult Regular)   Pulse 109   Temp 98.6  F (37  C) (Tympanic)   Resp 18   Ht 1.727 m (5' 8\")   Wt 112.3 kg (247 lb 8 oz)   LMP 02/10/2022   Breastfeeding No   BMI 37.63 kg/m   Estimated body mass index is 37.63 kg/m  as calculated from the following:    Height as of this encounter: 1.727 m (5' 8\").    Weight as of this encounter: 112.3 kg (247 lb 8 oz). .    Delaney Rausch, CHELSEA    "

## 2022-09-06 NOTE — PROGRESS NOTES
"[facility]    Return OB Note    CC: Return OB     Subjective:  Brandee Pink is a 32 year old  at 29w0d   Denies vaginal bleeding, loss of fluid, or regular contractions. Pos fetal movement. No headache, visual disturbance or RUQ pain.  Complaints today: none    Objective:  /65 (BP Location: Left arm, Patient Position: Chair, Cuff Size: Adult Regular)   Pulse 109   Temp 98.6  F (37  C) (Tympanic)   Resp 18   Ht 1.727 m (5' 8\")   Wt 112.3 kg (247 lb 8 oz)   LMP 02/10/2022   Breastfeeding No   BMI 37.63 kg/m      See flowsheet.    Assessment/Plan:     32 year old  at 29w0d  Encounter Diagnoses   Name Primary?     29 weeks gestation of pregnancy Yes     Need for prophylactic vaccination and inoculation against influenza      Encounter for supervision of other normal pregnancy in third trimester      Prior  section.  Repeat  scheduled  GDMA2 - She reports more stress and her blood sugar has increased a little with the stress.  Otherwise, they have been better for most of her checks.  Incomplete anatomic survey - has follow up scheduled.  Return precautions given  Discussed  labor and fetal movement precautions.  Rhogam not indicated  Tdap done today.  Blood tests done including GTT and syphilis screen at prior visit.    RTC 2 weeks    Umu Rosales MD   "

## 2022-09-06 NOTE — PROGRESS NOTES
Prior to immunization administration, verified patients identity using patient s name and date of birth. Please see Immunization Activity for additional information.     Screening Questionnaire for Adult Immunization    Are you sick today?   No   Do you have allergies to medications, food, a vaccine component or latex? yes   Have you ever had a serious reaction after receiving a vaccination?   No   Do you have a long-term health problem with heart, lung, kidney, or metabolic disease (e.g., diabetes), asthma, a blood disorder, no spleen, complement component deficiency, a cochlear implant, or a spinal fluid leak?  Are you on long-term aspirin therapy?   Yes anemia/ asthma   Do you have cancer, leukemia, HIV/AIDS, or any other immune system problem?   No   Do you have a parent, brother, or sister with an immune system problem?   No   In the past 3 months, have you taken medications that affect  your immune system, such as prednisone, other steroids, or anticancer drugs; drugs for the treatment of rheumatoid arthritis, Crohn s disease, or psoriasis; or have you had radiation treatments?   No   Have you had a seizure, or a brain or other nervous system problem?   No   During the past year, have you received a transfusion of blood or blood    products, or been given immune (gamma) globulin or antiviral drug?   No   For women: Are you pregnant or is there a chance you could become       pregnant during the next month? yes   Have you received any vaccinations in the past 4 weeks?   No     Immunization questionnaire answers were all negative.        Per orders of Dr. Martin, injection of TDAP given by Delaney Rausch. Patient instructed to remain in clinic for 15 minutes afterwards, and to report any adverse reaction to me immediately.       Screening performed by Delaney Rausch on 9/6/2022 at 8:56 AM.

## 2022-09-06 NOTE — PATIENT INSTRUCTIONS
Thank you for choosing us for your care.      If you have concerns or questions, please call us at [phone] and use option #2, option #1 and option #2 during clinic business hours to reach my care team.  After hours, dex call [phone] for the nurse advice line.    At this time in your pregnancy, I would like you to call the care team if you have any of the followin or more contractions an hour that do not resolve with rest and increased fluid intake  Leaking of fluid like your water broke  A little spotting may be normal after intercourse or an exam in the office.  If you have heavy bleeding or bleeding associated with pain, please call  Decreased fetal movement with less than 10 movements in 2 hours during a time that you know baby is typically active  A severe headache that is unusual for you that does not improve with Tylenol per package directions, rest and/or increased fluid intake  Visual changes such as blurry vision, double vision, seeing gloaters or bright flashing lights  Severe upper abdominal pain that is new  A sudden increase in swelling in your feet, hands or face

## 2022-09-10 ENCOUNTER — NURSE TRIAGE (OUTPATIENT)
Dept: NURSING | Facility: CLINIC | Age: 32
End: 2022-09-10

## 2022-09-10 ENCOUNTER — HOSPITAL ENCOUNTER (EMERGENCY)
Facility: CLINIC | Age: 32
Discharge: HOME | End: 2022-09-10
Attending: FAMILY MEDICINE | Admitting: FAMILY MEDICINE
Payer: COMMERCIAL

## 2022-09-10 VITALS
BODY MASS INDEX: 37.59 KG/M2 | TEMPERATURE: 97.3 F | OXYGEN SATURATION: 100 % | SYSTOLIC BLOOD PRESSURE: 126 MMHG | HEIGHT: 68 IN | DIASTOLIC BLOOD PRESSURE: 84 MMHG | HEART RATE: 101 BPM | WEIGHT: 248 LBS | RESPIRATION RATE: 18 BRPM

## 2022-09-10 DIAGNOSIS — R73.9 HYPERGLYCEMIA: ICD-10-CM

## 2022-09-10 DIAGNOSIS — R11.0 NAUSEA: ICD-10-CM

## 2022-09-10 DIAGNOSIS — O24.414 INSULIN CONTROLLED GESTATIONAL DIABETES MELLITUS (GDM) IN THIRD TRIMESTER: ICD-10-CM

## 2022-09-10 LAB
ALBUMIN SERPL BCG-MCNC: 3.5 G/DL (ref 3.5–5.2)
ALP SERPL-CCNC: 165 U/L (ref 35–104)
ALT SERPL W P-5'-P-CCNC: 39 U/L (ref 10–35)
ANION GAP SERPL CALCULATED.3IONS-SCNC: 10 MMOL/L (ref 7–15)
AST SERPL W P-5'-P-CCNC: 24 U/L (ref 10–35)
BASOPHILS # BLD AUTO: 0 10E3/UL (ref 0–0.2)
BASOPHILS NFR BLD AUTO: 0 %
BILIRUB SERPL-MCNC: 0.2 MG/DL
BUN SERPL-MCNC: 6.6 MG/DL (ref 6–20)
CALCIUM SERPL-MCNC: 9.5 MG/DL (ref 8.6–10)
CHLORIDE SERPL-SCNC: 104 MMOL/L (ref 98–107)
CREAT SERPL-MCNC: 0.53 MG/DL (ref 0.51–0.95)
DEPRECATED HCO3 PLAS-SCNC: 23 MMOL/L (ref 22–29)
EOSINOPHIL # BLD AUTO: 0.1 10E3/UL (ref 0–0.7)
EOSINOPHIL NFR BLD AUTO: 2 %
ERYTHROCYTE [DISTWIDTH] IN BLOOD BY AUTOMATED COUNT: 14.4 % (ref 10–15)
GFR SERPL CREATININE-BSD FRML MDRD: >90 ML/MIN/1.73M2
GLUCOSE BLDC GLUCOMTR-MCNC: 78 MG/DL (ref 70–99)
GLUCOSE SERPL-MCNC: 145 MG/DL (ref 70–99)
HCT VFR BLD AUTO: 34.3 % (ref 35–47)
HGB BLD-MCNC: 10.8 G/DL (ref 11.7–15.7)
HOLD SPECIMEN: NORMAL
IMM GRANULOCYTES # BLD: 0.1 10E3/UL
IMM GRANULOCYTES NFR BLD: 2 %
LYMPHOCYTES # BLD AUTO: 1.6 10E3/UL (ref 0.8–5.3)
LYMPHOCYTES NFR BLD AUTO: 22 %
MCH RBC QN AUTO: 27.8 PG (ref 26.5–33)
MCHC RBC AUTO-ENTMCNC: 31.5 G/DL (ref 31.5–36.5)
MCV RBC AUTO: 88 FL (ref 78–100)
MONOCYTES # BLD AUTO: 0.5 10E3/UL (ref 0–1.3)
MONOCYTES NFR BLD AUTO: 7 %
NEUTROPHILS # BLD AUTO: 5 10E3/UL (ref 1.6–8.3)
NEUTROPHILS NFR BLD AUTO: 67 %
NRBC # BLD AUTO: 0 10E3/UL
NRBC BLD AUTO-RTO: 0 /100
PLATELET # BLD AUTO: 234 10E3/UL (ref 150–450)
POTASSIUM SERPL-SCNC: 3.9 MMOL/L (ref 3.4–5.3)
PROT SERPL-MCNC: 7.4 G/DL (ref 6.4–8.3)
RBC # BLD AUTO: 3.88 10E6/UL (ref 3.8–5.2)
SODIUM SERPL-SCNC: 137 MMOL/L (ref 136–145)
WBC # BLD AUTO: 7.4 10E3/UL (ref 4–11)

## 2022-09-10 PROCEDURE — 85004 AUTOMATED DIFF WBC COUNT: CPT | Performed by: EMERGENCY MEDICINE

## 2022-09-10 PROCEDURE — 99282 EMERGENCY DEPT VISIT SF MDM: CPT | Performed by: FAMILY MEDICINE

## 2022-09-10 PROCEDURE — 99283 EMERGENCY DEPT VISIT LOW MDM: CPT | Performed by: FAMILY MEDICINE

## 2022-09-10 PROCEDURE — 80053 COMPREHEN METABOLIC PANEL: CPT | Performed by: EMERGENCY MEDICINE

## 2022-09-10 PROCEDURE — 36415 COLL VENOUS BLD VENIPUNCTURE: CPT | Performed by: EMERGENCY MEDICINE

## 2022-09-10 PROCEDURE — 85025 COMPLETE CBC W/AUTO DIFF WBC: CPT | Performed by: FAMILY MEDICINE

## 2022-09-10 PROCEDURE — 80053 COMPREHEN METABOLIC PANEL: CPT | Performed by: FAMILY MEDICINE

## 2022-09-10 ASSESSMENT — ENCOUNTER SYMPTOMS
NAUSEA: 1
MUSCULOSKELETAL NEGATIVE: 1
PSYCHIATRIC NEGATIVE: 1
COUGH: 0
HEADACHES: 1
SHORTNESS OF BREATH: 0
WEAKNESS: 0
HEMATOLOGIC/LYMPHATIC NEGATIVE: 1
VOMITING: 0
DIZZINESS: 1
DIARRHEA: 0
CHILLS: 0
ABDOMINAL PAIN: 0
FEVER: 0

## 2022-09-10 ASSESSMENT — ACTIVITIES OF DAILY LIVING (ADL): ADLS_ACUITY_SCORE: 33

## 2022-09-10 NOTE — ED NOTES
"Pt reports noticing symptoms of increased lethargy, nausea, and dizziness after eating honey bunches of oats cereal this morning. After 20 min of persisting symptoms, pt called nurse line and was encouraged to be seen. Pt currently reports still feeling slightly \"off\" but denies nausea or dizziness.       "

## 2022-09-10 NOTE — TELEPHONE ENCOUNTER
"  Patient states she is pregnant , 29 weeks. And thsi morning she got up, and ate ho \"Honey Nut Cheerios\"   For breakfast and her BS went up to 243 , She reports it does not rise that high after eating.     Patient advised to go to the ER at [place] due to Gestational Diabetes , and her BS .  Patient expressed understanding.    Niya Adame RN   [facility] Nurse Advisor    Reason for Disposition    [1] Blood glucose > 240 mg/dL (13.3 mmol/L) AND [2] urine ketones moderate-large (or more than 1+)    Additional Information    Negative: [1] Blood glucose > 240 mg/dL (13.3 mmol/L) AND [2] rapid breathing    Negative: Unconscious or difficult to awaken    Negative: Acting confused (e.g., disoriented, slurred speech)    Negative: Very weak (e.g., can't stand)    Negative: Sounds like a life-threatening emergency to the triager    Negative: [1] Vomiting AND [2] signs of dehydration (e.g., very dry mouth, lightheaded, dark urine)    Protocols used: DIABETES - HIGH BLOOD SUGAR-A-AH      "

## 2022-09-10 NOTE — ED PROVIDER NOTES
History     Chief Complaint   Patient presents with     Dizziness     Hyperglycemia     HPI  Brandee Nesbitt is a 32 year old female who presents with dizziness and nausea and elevated blood sugar this morning while at about 29 weeks gestation pregnancy.      Patient has gestational diabetes.  She started NPH insulin 20 units in the evening about 2 weeks ago.  She has been recording her blood sugars 4 times a day including morning and after breakfast.  She typically runs about 80 in the morning and about 160 after breakfast.    Today the patient ate some cereal her sister bought.  It sounds like the cereal was honey oats with strawberries.  The patient reported after breakfast feeling dizzy, tired, headachy and nauseated.  She checked her blood sugar and it was 243 and 269 so she became concerned, called triage and then presented to the ER.    She is  3 para 2-0-0-2.  She has a 14-year-old and an 8-year-old, both girls.    She reports to me that up to this point her current pregnancy is growing well.  She is getting her care here in [place].    She has not had fever or vomiting.      Allergies:  Allergies   Allergen Reactions     Diagnostic X-Ray Materials Shortness Of Breath, Other (See Comments) and Dizziness     Contrast dye-Shortness of breath  Pt became sleepy and confused. Needed to remind the pt to keep breathing, but denied any itching, swelling, or breathing difficulties.     Naproxen Shortness Of Breath     Phentermine Other (See Comments)     Palpitations, chest pain     Vancomycin Itching, Other (See Comments) and Rash     After few minutes, redness and itching noted in forearm. Symptoms diminished in few minutes after rate decreased by 1/2.    Patient has history of vancomycin infusion reaction. For further doses, vancomycin should be infused at a rate no higher than 10 mg/min or each gram over 100 minutes.  May also consider administering diphenhydramine and famotidine one hour before  infusion.  Rash all over body itchy       Kiwi        Problem List:    Patient Active Problem List    Diagnosis Date Noted     GDM (gestational diabetes mellitus) 2022     Priority: Medium     Elevated one hour high enough to dx GDM@ 229  DE referral sent.          Pregnancy 2022     Priority: Medium     Prenatal care, subsequent pregnancy 2022     Priority: Medium     FOB- Eldeen Son       Red blood cell antibody positive 2021     Priority: Medium     Anti-M (COLD) identified on 21  Not clinically significant         History of right mastectomy 2021     Priority: Medium     Ductal carcinoma in situ (DCIS) of right breast 2021     Priority: Medium     Dx at time of breast reduction  S/p simple right mastectomy   [facility]       Estrogen receptor positive status (ER+) 2021     Priority: Medium     Macromastia 2021     Priority: Medium     Chest pain 10/04/2020     Priority: Medium     Gastroesophageal reflux disease without esophagitis 10/04/2020     Priority: Medium     Lung nodule 10/04/2020     Priority: Medium     Headaches due to old head trauma 01/15/2020     Priority: Medium     Injury of left shoulder 01/15/2020     Priority: Medium     Mild intermittent asthma without complication 2017     Priority: Medium        Past Medical History:    Past Medical History:   Diagnosis Date     Anemia      Anti-mi-2 antibody present      Breast cancer (H)      Gastroesophageal reflux disease      Mild intermittent asthma without complication      Obesity        Past Surgical History:    Past Surgical History:   Procedure Laterality Date     AS REDUCTION OF LARGE BREAST  2021      SECTION       CHOLECYSTECTOMY       right mastectomy  2021       Family History:    Family History   Problem Relation Age of Onset     No Known Problems Mother      Other - See Comments Father         murder at age 42     No Known Problems Sister      No  "Known Problems Sister      No Known Problems Brother      No Known Problems Brother      No Known Problems Maternal Grandmother      No Known Problems Maternal Grandfather      No Known Problems Paternal Grandmother      No Known Problems Paternal Grandfather        Social History:  Marital Status:  Single [1]  Social History     Tobacco Use     Smoking status: Never Smoker     Smokeless tobacco: Never Used   Vaping Use     Vaping Use: Never used   Substance Use Topics     Alcohol use: Not Currently     Comment: very occasional-quit with pregnancy     Drug use: Never        Medications:    acetone urine (KETOSTIX) test strip  albuterol (PROAIR HFA/PROVENTIL HFA/VENTOLIN HFA) 108 (90 Base) MCG/ACT inhaler  alcohol swab prep pads  blood glucose (ONETOUCH VERIO IQ) test strip  Blood Glucose Monitoring Suppl (ONETOUCH VERIO FLEX SYSTEM) w/Device KIT  ferrous sulfate (SLO-FE) 142 (45 Fe) MG CR tablet  FOLIC ACID PO  insulin  UNIT/ML injection  insulin pen needle (ULTICARE MICRO) 32G X 4 MM miscellaneous  multivitamin w/minerals (MULTI-VITAMIN) tablet  ondansetron (ZOFRAN ODT) 4 MG ODT tab  ondansetron (ZOFRAN ODT) 8 MG ODT tab  ondansetron (ZOFRAN-ODT) 4 MG ODT tab  OneTouch Delica Lancets 33G MISC          Review of Systems   Constitutional: Negative for chills and fever.   HENT: Negative.    Respiratory: Negative for cough and shortness of breath.    Cardiovascular: Negative for chest pain.   Gastrointestinal: Positive for nausea. Negative for abdominal pain, diarrhea and vomiting.   Genitourinary: Negative.    Musculoskeletal: Negative.    Skin: Negative.    Neurological: Positive for dizziness and headaches. Negative for weakness.   Hematological: Negative.    Psychiatric/Behavioral: Negative.        Physical Exam   BP: 126/84  Pulse: 101  Temp: 97.3  F (36.3  C)  Resp: 18  Height: 172.7 cm (5' 8\")  Weight: 112.5 kg (248 lb)  SpO2: 100 %      Physical Exam  Constitutional:       General: She is not in acute " distress.  HENT:      Head: Normocephalic.      Mouth/Throat:      Mouth: Mucous membranes are moist.      Pharynx: Oropharynx is clear.   Eyes:      General: No scleral icterus.     Pupils: Pupils are equal, round, and reactive to light.   Cardiovascular:      Rate and Rhythm: Normal rate and regular rhythm.      Heart sounds: No murmur heard.  Pulmonary:      Effort: No respiratory distress.      Breath sounds: Normal breath sounds.   Abdominal:      Comments: Gravid uterus, nontender to palpation.  Fetal heart tones 139.   Musculoskeletal:         General: No swelling or tenderness.      Cervical back: Neck supple. No tenderness.   Skin:     General: Skin is warm and dry.      Findings: No rash.   Neurological:      General: No focal deficit present.      Mental Status: She is alert.   Psychiatric:         Mood and Affect: Mood normal.         ED Course          1240 -this patient was interviewed and examined.  Labs reviewed.  CHEM profile drawn when she came to ER shows a blood sugar of 145 and subsequently a fingerstick glucose was 78.  The patient was given a sandwich.  She was not nauseated or significantly dizzy and tolerated liquids and solid food.         Procedures              Critical Care time:  none               Results for orders placed or performed during the hospital encounter of 09/10/22 (from the past 24 hour(s))   [place] Draw    Narrative    The following orders were created for panel order [place] Draw.  Procedure                               Abnormality         Status                     ---------                               -----------         ------                     Extra Blue Top Tube[[id number]]                              Final result               Extra Red Top Tube[[id number]]                               Final result               Extra Green Top (Lithium...[[id number]]                      Final result               Extra Purple Top Tube[[id number]]                             Final result                 Please view results for these tests on the individual orders.   Extra Blue Top Tube   Result Value Ref Range    Hold Specimen JIC    Extra Red Top Tube   Result Value Ref Range    Hold Specimen JIC    Extra Green Top (Lithium Heparin) Tube   Result Value Ref Range    Hold Specimen     Extra Purple Top Tube   Result Value Ref Range    Hold Specimen     CBC with platelets, differential    Narrative    The following orders were created for panel order CBC with platelets, differential.  Procedure                               Abnormality         Status                     ---------                               -----------         ------                     CBC with platelets and d...[[id number]]  Abnormal            Final result                 Please view results for these tests on the individual orders.   Comprehensive metabolic panel   Result Value Ref Range    Sodium 137 136 - 145 mmol/L    Potassium 3.9 3.4 - 5.3 mmol/L    Creatinine 0.53 0.51 - 0.95 mg/dL    Urea Nitrogen 6.6 6.0 - 20.0 mg/dL    Chloride 104 98 - 107 mmol/L    Carbon Dioxide (CO2) 23 22 - 29 mmol/L    Anion Gap 10 7 - 15 mmol/L    Glucose 145 (H) 70 - 99 mg/dL    Calcium 9.5 8.6 - 10.0 mg/dL    Protein Total 7.4 6.4 - 8.3 g/dL    Albumin 3.5 3.5 - 5.2 g/dL    Bilirubin Total 0.2 <=1.2 mg/dL    Alkaline Phosphatase 165 (H) 35 - 104 U/L    AST 24 10 - 35 U/L    ALT 39 (H) 10 - 35 U/L    GFR Estimate >90 >60 mL/min/1.73m2   CBC with platelets and differential   Result Value Ref Range    WBC Count 7.4 4.0 - 11.0 10e3/uL    RBC Count 3.88 3.80 - 5.20 10e6/uL    Hemoglobin 10.8 (L) 11.7 - 15.7 g/dL    Hematocrit 34.3 (L) 35.0 - 47.0 %    MCV 88 78 - 100 fL    MCH 27.8 26.5 - 33.0 pg    MCHC 31.5 31.5 - 36.5 g/dL    RDW 14.4 10.0 - 15.0 %    Platelet Count 234 150 - 450 10e3/uL    % Neutrophils 67 %    % Lymphocytes 22 %    % Monocytes 7 %    % Eosinophils 2 %    % Basophils 0 %    % Immature Granulocytes 2 %    NRBCs per 100  WBC 0 <1 /100    Absolute Neutrophils 5.0 1.6 - 8.3 10e3/uL    Absolute Lymphocytes 1.6 0.8 - 5.3 10e3/uL    Absolute Monocytes 0.5 0.0 - 1.3 10e3/uL    Absolute Eosinophils 0.1 0.0 - 0.7 10e3/uL    Absolute Basophils 0.0 0.0 - 0.2 10e3/uL    Absolute Immature Granulocytes 0.1 <=0.4 10e3/uL    Absolute NRBCs 0.0 10e3/uL   Glucose by meter   Result Value Ref Range    GLUCOSE BY METER POCT 78 70 - 99 mg/dL       Medications - No data to display    Assessments & Plan (with Medical Decision Making)     32-year-old woman at about 29-1/2 weeks pregnancy with gestational diabetes experienced the above situation.  Her blood sugars look more favorable on the readings taken in the ER so it is suspicious that the sugar load of her breakfast affected the numbers.  She was not having any additional dizziness or nausea.  Some of this may be discomforts of pregnancy in play also.  She was taking solid and liquid intake and was up and about comfortably so she is ready for discharge.  I did not change her insulin dose.  She will follow-up with OB.  She reports understanding of results and recommendations and is agreeable with this plan.          I have reviewed the nursing notes.    I have reviewed the findings, diagnosis, plan and need for follow up with the patient.       New Prescriptions    No medications on file       Final diagnoses:   Insulin controlled gestational diabetes mellitus (GDM) in third trimester   Hyperglycemia   Nausea       9/10/2022   [facility] EMERGENCY DEPT     Nolberto Noriega MD  09/10/22 [id number]

## 2022-09-10 NOTE — ED NOTES
Blood sugar at this time-78. MD at bedside and instructed that food be given. Pt agreeable to this as she states that she is feeling hungry. Turkey sandwich and apple juice given.

## 2022-09-10 NOTE — DISCHARGE INSTRUCTIONS
Elevated blood sugar may have been due to sugar load in your breakfast today.    Blood sugars in the ER appear more in line with what you have been experiencing.    At this point I would not suggest changing your insulin dosage.    Have follow-up with your OB doctors as planned.

## 2022-09-10 NOTE — ED TRIAGE NOTES
"Pt is 29 weeks pregnant, gestational diabetic. Pt checked sugars at home this AM, they were 243 and 269, pt uses \"slow acting insulin\" at bedtime only. Pt also c/o dizziness that started earlier this AM. Pt drove herself here today. Pt states that she called triage and was told to come to ER for evaluation. Pt denies any concerns about baby, feeling baby move and no vaginal bleeding/discharge. Pt states that she does have some mild cramping.      Triage Assessment     Row Name 09/10/22 1050       Triage Assessment (Adult)    Airway WDL WDL       Respiratory WDL    Respiratory WDL WDL       Skin Circulation/Temperature WDL    Skin Circulation/Temperature WDL WDL       Cardiac WDL    Cardiac WDL WDL       Peripheral/Neurovascular WDL    Peripheral Neurovascular WDL WDL       Cognitive/Neuro/Behavioral WDL    Cognitive/Neuro/Behavioral WDL WDL              "

## 2022-09-12 ENCOUNTER — VIRTUAL VISIT (OUTPATIENT)
Dept: EDUCATION SERVICES | Facility: CLINIC | Age: 32
End: 2022-09-12
Payer: COMMERCIAL

## 2022-09-12 ENCOUNTER — MYC MEDICAL ADVICE (OUTPATIENT)
Dept: NUTRITION | Facility: CLINIC | Age: 32
End: 2022-09-12

## 2022-09-12 DIAGNOSIS — O24.414 INSULIN CONTROLLED GESTATIONAL DIABETES MELLITUS (GDM) IN SECOND TRIMESTER: Primary | ICD-10-CM

## 2022-09-12 PROCEDURE — 98967 PH1 ASSMT&MGMT NQHP 11-20: CPT | Mod: 95

## 2022-09-12 NOTE — PROGRESS NOTES
Diabetes and Pregnancy Follow-up  Type of Service: Telephone Visit    How would patient like to obtain AVS? Not needed    Subjective/Objective:    Brandee Nesbitt was called for a scheduled BG review. Last date of communication was: 8/25/2022.    Gestational diabetes is being managed with diet, activity and medications    Taking diabetes medications:   yes:     Diabetes Medication(s)     Insulin       insulin  UNIT/ML injection    22 units once per day at bedtime.     Patient taking differently: 18 units once per day at bedtime.          Estimated Date of Delivery: Nov 22, 2022    Blood Glucose/Ketone Log:    Date Ketones Fasting Post Breakfast Post Lunch Post Supper   9/9  95 163 146 127 (18 unit(s) NPH)   9/10  94 143 78 137   9/11  101 248 140 113   9/12  95 137       29w6d    Assessment:    Ketones: na.   Fasting blood glucoses: 75% in target.  After breakfast: 25% in target.  After lunch: 66% in target.  After dinner: 100% in target.    On Saturday, she ate cereal for breakfast, and had a BG of 248 accompanied by nausea, dizziness and went to the ER. She wants to know how to avoid needing to go to the hospital if high blood sugars occur again. Reviewed that breakfast cereal (or added sugar, fruit, juice) can spike blood sugars in the morning, especially without protein/fat at the meal. Reviewed appropriate breakfast options to prevent elevations.     Plan/Response:  Follow-up in 3-4 days -Mychart review of post meal numbers especially    MARK ANTHONY Padilla CDCES  Time Spent: 12;42 minutes    Any diabetes medication dose changes were made via the CDE Protocol and Collaborative Practice Agreement with the patient's OB/GYN provider. A copy of this encounter was shared with the provider.

## 2022-09-15 ENCOUNTER — TELEPHONE (OUTPATIENT)
Dept: EDUCATION SERVICES | Facility: CLINIC | Age: 32
End: 2022-09-15

## 2022-09-15 NOTE — TELEPHONE ENCOUNTER
Gestational Diabetes Follow-up    Subjective/Objective:    Brandee Nesbitt sent in blood glucose log for review. Last date of communication was: 9/12/22.    Gestational diabetes is being managed with diet, activity and medications    Taking diabetes medications:   yes:     Diabetes Medication(s)     Insulin       insulin  UNIT/ML injection    22 units once per day at bedtime.     Patient taking differently: 18 units once per day at bedtime.          Estimated Date of Delivery: Nov 22, 2022, 30w2d    BG/Food Log:       Assessment:  Blood sugars in target post lunch and dinner.  Recommend increasing to 22 units due to mixed fasting and post breakfast readings.     Ketones: negative.   Fasting blood glucoses: 50% in target.  After breakfast: 33% in target.  After lunch: 100% in target.  After dinner: 100% in target.    Plan/Response:  Recommend increase to insulin - NPH 0-0-0-22.    Follow up Monday.    Taylor Martines MS, RD, LD, CDE      Any diabetes medication dose changes were made via the CDE Protocol and Collaborative Practice Agreement with the patient's OB/GYN provider. A copy of this encounter was shared with the provider.

## 2022-09-19 ENCOUNTER — HOSPITAL ENCOUNTER (OUTPATIENT)
Dept: PHYSICAL THERAPY | Facility: CLINIC | Age: 32
Setting detail: THERAPIES SERIES
Discharge: HOME | End: 2022-09-19
Attending: OBSTETRICS & GYNECOLOGY
Payer: COMMERCIAL

## 2022-09-19 PROCEDURE — 97110 THERAPEUTIC EXERCISES: CPT | Mod: GP | Performed by: PHYSICAL THERAPIST

## 2022-09-19 PROCEDURE — 97140 MANUAL THERAPY 1/> REGIONS: CPT | Mod: GP | Performed by: PHYSICAL THERAPIST

## 2022-09-20 ENCOUNTER — MYC MEDICAL ADVICE (OUTPATIENT)
Dept: NUTRITION | Facility: CLINIC | Age: 32
End: 2022-09-20

## 2022-09-20 NOTE — PROGRESS NOTES
"*This note serves as the Discharge note, as pt did not return and this is last known status.        Physical Therapy Progress Note    Patient Name: Brandee Nesbitt  MR#: [id number]  : 1990  MD name: Roro Díaz MD  Pt seen from: 6/3/22 to 22  Diagnosis: LBP during Pregnancy         22 0800   Signing Clinician's Name / Credentials   Signing clinician's name / credentials Paz Nichole, PT MA #[id number]   Session Number   Session Number / ([facility])   Authorization status 25 visits in a calendar year hard max   Progress Note/Recertification   Progress Note Due Date 22   Progress Note Completed Date 22   Ortho Goal 1   Goal Identifier STG   Goal Description 1)Pt will trial and possibly purchase SIJ belt to reduce pain with standing tasks, in 3 weeks.   Goal Progress Met: pt obtained a belt from the OB/GYN and is using on a routine basis.   Target Date 22   Date Met 22   Ortho Goal 2   Goal Identifier STG   Goal Description 2)Pt will consistently use PFM contraction with sit to stand motion to report 2/10 or less pain with sit to stand tasks, in 3 weeks.   Goal Progress Not Fully Met: pt has been forgetting this extra suppor.  (cont for 3 weeks)   Target Date 10/10/22   Ortho Goal 3   Goal Identifier LTG   Goal Description 3)Pt will report pain with sitting at 3/10 or less, in 6 weeks.   Goal Progress Not Met: states pain is 8-9/10 most of the time now.  (cont for 4 weeks.)   Target Date 10/17/22   Ortho Goal 4   Goal Identifier LTG   Goal Description 4)Pt will be indep in HEP to allow her to stay active with 2/10 or less pain during her pregnancy, in 8 weeks.   Goal Progress Not Met: pt states was staying somewhat active and pain was \"okay\" with using belt, but in last 3-4 weeks pain has worsened.  (cont for 6 weeks)   Target Date 10/31/22   Subjective Report   Subjective Report Back pain is getting worse with baby growth. Been using a mother to be belt, " "especially if going to store or on feet for extended periods. Back is also sore with lying to sleep.   Objective Measure 1   Objective Measure Leaking   Details Denies leaking   Objective Measure 2   Objective Measure Pain   Details States pain is in LB and points across (B) at waist/level of ICs. Rates at 8-9/10 \"most of the time.   Objective Measure 3   Objective Measure Pelvic Alignment   Details WNL, no rotational or upslip components detected   Therapeutic Procedure/exercise   Therapeutic Procedures: strength, endurance, ROM, flexibillity minutes ([id number]) 30   Skilled Intervention Exer: stretching, positioning, body mechanics; progression of HEP   Patient Response Pt able to demo all exers correctly, and verbalized understanding functional use of PFM.   Treatment Detail Re-instructed in PFCs for both stability strengthening, but explained is also for trying to remind body to use PFM more functionally.  Reminded to use with all sit to stand and with all bed mobility. Educated in proper body mech for lifting small loads, Instructed that lying on (L) side is preferred for circulation, digestion and general \"good baby health,\" but is okay to sleep in multiple positions and shoud be using \"lots' of pillows to support multiple body parts for comfort and to get adequate rest.  Reviewed standing piriformis mm stretch - told could do on desk while working, on armrest of couch, and on edge of bed. Taught pendulum leg swings with opposite foot on book for decreasing hip pain and pressure.   Manual Therapy   Manual Therapy: Mobilization, MFR, MLD, friction massage minutes ([id number]) 30   Skilled Intervention MT: STM, mobs, nn glides   Patient Response States \"this was so helpful last time\" with STM.   Treatment Detail SL with pillow for Pudendal nn release, done (B) with mildly more attn (R) side.  Gapping SIJ (R) in SL. STM to Glute med, Glute Max and piriformis mms.   Plan   Homework [id number]   Home program Cont with " current HEP. Added/reminded to do standing Piriformis stretch, and added pendulums.   Plan for next session See pt weekly to advance HEP and assist with pain control via MT prn.   Total Session Time   Timed Code Treatment Minutes 60 (2TE,2MT)   Total Treatment Time (sum of timed and untimed services) 60 (2TE,2MT)   Thank you for the referral of this patient.  Paz Nichole, PT, MA  #[id number]

## 2022-09-20 NOTE — TELEPHONE ENCOUNTER
Gestational Diabetes Follow-up    Subjective/Objective:    Brandee Nesbitt sent in blood glucose log for review. Last date of communication was: 9/15/2022.    Gestational diabetes is being managed with diet, activity and medications    Taking diabetes medications:   yes:     Diabetes Medication(s)     Insulin       insulin  UNIT/ML injection    22 units once per day at bedtime.     Patient taking differently: 18 units once per day at bedtime.          Estimated Date of Delivery: Nov 22, 2022    BG/Food Log:         Assessment since insulin increase 9/15:    Ketones: neg.   Fasting blood glucoses: 60% in target.  After breakfast: 17% in target.  After lunch: 83% in target.  After dinner: 25% in target.    Post meal numbers are rising considerably, especially breakfast. May need to initiate meal time insulin pending discussion on diet.     Plan/Response:  Recommend that patient begin 2 units Novolog with breakfast and dinner if unable to improve with diet in 2-3 days.  See MyChart for recommendations    MARK ANTHONY Padilla CDCES    Any diabetes medication dose changes were made via the CDE Protocol and Collaborative Practice Agreement with the patient's OB/GYN provider. A copy of this encounter was shared with the provider.

## 2022-09-22 ENCOUNTER — MEDICAL CORRESPONDENCE (OUTPATIENT)
Dept: OBGYN | Facility: CLINIC | Age: 32
End: 2022-09-22

## 2022-09-22 ENCOUNTER — TELEPHONE (OUTPATIENT)
Dept: OBGYN | Facility: CLINIC | Age: 32
End: 2022-09-22

## 2022-09-22 ENCOUNTER — PRENATAL OFFICE VISIT (OUTPATIENT)
Dept: OBGYN | Facility: CLINIC | Age: 32
End: 2022-09-22
Payer: COMMERCIAL

## 2022-09-22 VITALS
TEMPERATURE: 97.7 F | BODY MASS INDEX: 38.37 KG/M2 | HEIGHT: 68 IN | RESPIRATION RATE: 16 BRPM | SYSTOLIC BLOOD PRESSURE: 106 MMHG | WEIGHT: 253.2 LBS | HEART RATE: 99 BPM | DIASTOLIC BLOOD PRESSURE: 63 MMHG

## 2022-09-22 DIAGNOSIS — N89.8 VAGINAL DISCHARGE: Primary | ICD-10-CM

## 2022-09-22 DIAGNOSIS — O09.299 HISTORY OF MACROSOMIA IN INFANT IN PRIOR PREGNANCY, CURRENTLY PREGNANT: ICD-10-CM

## 2022-09-22 DIAGNOSIS — O24.410 DIET CONTROLLED GESTATIONAL DIABETES MELLITUS (GDM) IN SECOND TRIMESTER: ICD-10-CM

## 2022-09-22 LAB
CLUE CELLS: PRESENT
TRICHOMONAS, WET PREP: ABNORMAL
WBC'S/HIGH POWER FIELD, WET PREP: ABNORMAL
YEAST, WET PREP: ABNORMAL

## 2022-09-22 PROCEDURE — 99207 PR PRENATAL VISIT: CPT | Performed by: OBSTETRICS & GYNECOLOGY

## 2022-09-22 PROCEDURE — 87210 SMEAR WET MOUNT SALINE/INK: CPT | Performed by: OBSTETRICS & GYNECOLOGY

## 2022-09-22 RX ORDER — BLOOD SUGAR DIAGNOSTIC
STRIP MISCELLANEOUS
Qty: 150 STRIP | Refills: 4 | Status: CANCELLED | OUTPATIENT
Start: 2022-09-22

## 2022-09-22 RX ORDER — LANCETS 33 GAUGE
1 EACH MISCELLANEOUS 4 TIMES DAILY
Qty: 200 EACH | Refills: 4 | Status: CANCELLED | OUTPATIENT
Start: 2022-09-22

## 2022-09-23 DIAGNOSIS — N76.0 BV (BACTERIAL VAGINOSIS): Primary | ICD-10-CM

## 2022-09-23 DIAGNOSIS — B96.89 BV (BACTERIAL VAGINOSIS): Primary | ICD-10-CM

## 2022-09-23 RX ORDER — METRONIDAZOLE 7.5 MG/G
1 GEL VAGINAL DAILY
Qty: 50 G | Refills: 0 | Status: SHIPPED | OUTPATIENT
Start: 2022-09-23 | End: 2022-09-30

## 2022-09-23 NOTE — TELEPHONE ENCOUNTER
Prescription sent by Dr. Woodard to pharmacy which patient was going to check with for     Larissa Cartwright   [facility] Clinic  RN

## 2022-09-23 NOTE — TELEPHONE ENCOUNTER
Component      Latest Ref Rng & Units 9/22/2022   Trichomonas      Absent Absent   Yeast      Absent Absent   Clue cells      Absent Present (A)   WBCs/high power field      None 1+ (A)     Patient advised of a bacterial infection.  Patient prefers vaginal gel treatment over oral.  Patient uses the pharmacy downstairs.    Please send prescription there.    Larissa Cartwright   [facility] Clinic  RN

## 2022-09-23 NOTE — TELEPHONE ENCOUNTER
Reason for Call:  Other call back    Detailed comments: Pt calling - looking for lab results from yesterday - received them through [employer] but doesn't understand them.    Phone Number Patient can be reached at: Home number on file [phone] (home)    Best Time:     Can we leave a detailed message on this number? YES    Call taken on 9/23/2022 at 9:08 AM by Crys Christensen

## 2022-09-27 ENCOUNTER — PRENATAL OFFICE VISIT (OUTPATIENT)
Dept: OBGYN | Facility: CLINIC | Age: 32
End: 2022-09-27
Payer: COMMERCIAL

## 2022-09-27 ENCOUNTER — TELEPHONE (OUTPATIENT)
Dept: OBGYN | Facility: CLINIC | Age: 32
End: 2022-09-27

## 2022-09-27 ENCOUNTER — HOSPITAL ENCOUNTER (EMERGENCY)
Facility: CLINIC | Age: 32
Discharge: HOME | End: 2022-09-27
Attending: FAMILY MEDICINE | Admitting: FAMILY MEDICINE
Payer: COMMERCIAL

## 2022-09-27 ENCOUNTER — HOSPITAL ENCOUNTER (OUTPATIENT)
Dept: ULTRASOUND IMAGING | Facility: CLINIC | Age: 32
Discharge: HOME | End: 2022-09-27
Attending: OBSTETRICS & GYNECOLOGY | Admitting: OBSTETRICS & GYNECOLOGY
Payer: COMMERCIAL

## 2022-09-27 VITALS
WEIGHT: 253.8 LBS | TEMPERATURE: 98.2 F | HEART RATE: 110 BPM | RESPIRATION RATE: 14 BRPM | HEIGHT: 68 IN | SYSTOLIC BLOOD PRESSURE: 115 MMHG | DIASTOLIC BLOOD PRESSURE: 73 MMHG | BODY MASS INDEX: 38.46 KG/M2

## 2022-09-27 VITALS
WEIGHT: 253 LBS | TEMPERATURE: 99.5 F | HEART RATE: 122 BPM | BODY MASS INDEX: 38.34 KG/M2 | OXYGEN SATURATION: 99 % | RESPIRATION RATE: 20 BRPM | SYSTOLIC BLOOD PRESSURE: 126 MMHG | DIASTOLIC BLOOD PRESSURE: 75 MMHG | HEIGHT: 68 IN

## 2022-09-27 DIAGNOSIS — R76.8 RED BLOOD CELL ANTIBODY POSITIVE: ICD-10-CM

## 2022-09-27 DIAGNOSIS — Z34.83 ENCOUNTER FOR SUPERVISION OF OTHER NORMAL PREGNANCY IN THIRD TRIMESTER: ICD-10-CM

## 2022-09-27 DIAGNOSIS — Z34.83 PRENATAL CARE, SUBSEQUENT PREGNANCY IN THIRD TRIMESTER: ICD-10-CM

## 2022-09-27 DIAGNOSIS — D05.11 DUCTAL CARCINOMA IN SITU (DCIS) OF RIGHT BREAST: ICD-10-CM

## 2022-09-27 DIAGNOSIS — O24.414 INSULIN CONTROLLED GESTATIONAL DIABETES MELLITUS (GDM) IN THIRD TRIMESTER: Primary | ICD-10-CM

## 2022-09-27 DIAGNOSIS — U07.1 INFECTION DUE TO 2019 NOVEL CORONAVIRUS: ICD-10-CM

## 2022-09-27 LAB
ALBUMIN SERPL BCG-MCNC: 3.4 G/DL (ref 3.5–5.2)
ALBUMIN UR-MCNC: NEGATIVE MG/DL
ALP SERPL-CCNC: 157 U/L (ref 35–104)
ALT SERPL W P-5'-P-CCNC: 59 U/L (ref 10–35)
ANION GAP SERPL CALCULATED.3IONS-SCNC: 11 MMOL/L (ref 7–15)
APPEARANCE UR: CLEAR
AST SERPL W P-5'-P-CCNC: 61 U/L (ref 10–35)
BACTERIA #/AREA URNS HPF: ABNORMAL /HPF
BASOPHILS # BLD AUTO: 0 10E3/UL (ref 0–0.2)
BASOPHILS NFR BLD AUTO: 1 %
BILIRUB SERPL-MCNC: 0.2 MG/DL
BILIRUB UR QL STRIP: NEGATIVE
BUN SERPL-MCNC: 6.5 MG/DL (ref 6–20)
CALCIUM SERPL-MCNC: 8.9 MG/DL (ref 8.6–10)
CHLORIDE SERPL-SCNC: 103 MMOL/L (ref 98–107)
COLOR UR AUTO: YELLOW
CREAT SERPL-MCNC: 0.61 MG/DL (ref 0.51–0.95)
DEPRECATED HCO3 PLAS-SCNC: 23 MMOL/L (ref 22–29)
EOSINOPHIL # BLD AUTO: 0.1 10E3/UL (ref 0–0.7)
EOSINOPHIL NFR BLD AUTO: 1 %
ERYTHROCYTE [DISTWIDTH] IN BLOOD BY AUTOMATED COUNT: 14.9 % (ref 10–15)
FLUAV RNA SPEC QL NAA+PROBE: NEGATIVE
FLUBV RNA RESP QL NAA+PROBE: NEGATIVE
GFR SERPL CREATININE-BSD FRML MDRD: >90 ML/MIN/1.73M2
GLUCOSE SERPL-MCNC: 99 MG/DL (ref 70–99)
GLUCOSE UR STRIP-MCNC: NEGATIVE MG/DL
HCT VFR BLD AUTO: 33.5 % (ref 35–47)
HGB BLD-MCNC: 10.6 G/DL (ref 11.7–15.7)
HGB UR QL STRIP: NEGATIVE
IMM GRANULOCYTES # BLD: 0.1 10E3/UL
IMM GRANULOCYTES NFR BLD: 1 %
KETONES UR STRIP-MCNC: NEGATIVE MG/DL
LEUKOCYTE ESTERASE UR QL STRIP: NEGATIVE
LYMPHOCYTES # BLD AUTO: 0.6 10E3/UL (ref 0.8–5.3)
LYMPHOCYTES NFR BLD AUTO: 10 %
MCH RBC QN AUTO: 28.4 PG (ref 26.5–33)
MCHC RBC AUTO-ENTMCNC: 31.6 G/DL (ref 31.5–36.5)
MCV RBC AUTO: 90 FL (ref 78–100)
MONOCYTES # BLD AUTO: 0.8 10E3/UL (ref 0–1.3)
MONOCYTES NFR BLD AUTO: 13 %
MUCOUS THREADS #/AREA URNS LPF: PRESENT /LPF
NEUTROPHILS # BLD AUTO: 4.4 10E3/UL (ref 1.6–8.3)
NEUTROPHILS NFR BLD AUTO: 74 %
NITRATE UR QL: NEGATIVE
NRBC # BLD AUTO: 0 10E3/UL
NRBC BLD AUTO-RTO: 0 /100
PH UR STRIP: 8 [PH] (ref 5–7)
PLATELET # BLD AUTO: 193 10E3/UL (ref 150–450)
POTASSIUM SERPL-SCNC: 3.7 MMOL/L (ref 3.4–5.3)
PROT SERPL-MCNC: 6.8 G/DL (ref 6.4–8.3)
RBC # BLD AUTO: 3.73 10E6/UL (ref 3.8–5.2)
RBC URINE: <1 /HPF
SARS-COV-2 RNA RESP QL NAA+PROBE: POSITIVE
SODIUM SERPL-SCNC: 137 MMOL/L (ref 136–145)
SP GR UR STRIP: 1.01 (ref 1–1.03)
SQUAMOUS EPITHELIAL: 1 /HPF
TROPONIN T SERPL HS-MCNC: <6 NG/L
UROBILINOGEN UR STRIP-MCNC: NORMAL MG/DL
WBC # BLD AUTO: 5.8 10E3/UL (ref 4–11)
WBC URINE: <1 /HPF

## 2022-09-27 PROCEDURE — 99284 EMERGENCY DEPT VISIT MOD MDM: CPT | Mod: CS | Performed by: FAMILY MEDICINE

## 2022-09-27 PROCEDURE — 250N000009 HC RX 250: Performed by: FAMILY MEDICINE

## 2022-09-27 PROCEDURE — 87636 SARSCOV2 & INF A&B AMP PRB: CPT | Performed by: FAMILY MEDICINE

## 2022-09-27 PROCEDURE — 84484 ASSAY OF TROPONIN QUANT: CPT | Performed by: FAMILY MEDICINE

## 2022-09-27 PROCEDURE — 96361 HYDRATE IV INFUSION ADD-ON: CPT

## 2022-09-27 PROCEDURE — 85025 COMPLETE CBC W/AUTO DIFF WBC: CPT | Performed by: FAMILY MEDICINE

## 2022-09-27 PROCEDURE — 96374 THER/PROPH/DIAG INJ IV PUSH: CPT

## 2022-09-27 PROCEDURE — 81001 URINALYSIS AUTO W/SCOPE: CPT | Performed by: FAMILY MEDICINE

## 2022-09-27 PROCEDURE — 250N000011 HC RX IP 250 OP 636: Performed by: FAMILY MEDICINE

## 2022-09-27 PROCEDURE — 93005 ELECTROCARDIOGRAM TRACING: CPT

## 2022-09-27 PROCEDURE — 93010 ELECTROCARDIOGRAM REPORT: CPT | Performed by: FAMILY MEDICINE

## 2022-09-27 PROCEDURE — 76819 FETAL BIOPHYS PROFIL W/O NST: CPT

## 2022-09-27 PROCEDURE — 80053 COMPREHEN METABOLIC PANEL: CPT | Performed by: FAMILY MEDICINE

## 2022-09-27 PROCEDURE — 250N000013 HC RX MED GY IP 250 OP 250 PS 637: Performed by: FAMILY MEDICINE

## 2022-09-27 PROCEDURE — 36415 COLL VENOUS BLD VENIPUNCTURE: CPT | Performed by: FAMILY MEDICINE

## 2022-09-27 PROCEDURE — 258N000003 HC RX IP 258 OP 636: Performed by: FAMILY MEDICINE

## 2022-09-27 PROCEDURE — C9803 HOPD COVID-19 SPEC COLLECT: HCPCS

## 2022-09-27 PROCEDURE — 99284 EMERGENCY DEPT VISIT MOD MDM: CPT | Mod: 25

## 2022-09-27 PROCEDURE — 99207 PR PRENATAL VISIT: CPT | Performed by: OBSTETRICS & GYNECOLOGY

## 2022-09-27 RX ORDER — ACETAMINOPHEN 500 MG
1000 TABLET ORAL EVERY 6 HOURS PRN
Qty: 60 TABLET | Refills: 0 | Status: SHIPPED | OUTPATIENT
Start: 2022-09-27 | End: 2022-10-02

## 2022-09-27 RX ORDER — ONDANSETRON 2 MG/ML
4 INJECTION INTRAMUSCULAR; INTRAVENOUS
Status: DISCONTINUED | OUTPATIENT
Start: 2022-09-27 | End: 2022-09-27 | Stop reason: HOSPADM

## 2022-09-27 RX ORDER — ACETAMINOPHEN 500 MG
1000 TABLET ORAL ONCE
Status: COMPLETED | OUTPATIENT
Start: 2022-09-27 | End: 2022-09-27

## 2022-09-27 RX ADMIN — SODIUM CHLORIDE 1000 ML: 9 INJECTION, SOLUTION INTRAVENOUS at 17:40

## 2022-09-27 RX ADMIN — LIDOCAINE HYDROCHLORIDE 30 ML: 20 SOLUTION ORAL; TOPICAL at 17:41

## 2022-09-27 RX ADMIN — ACETAMINOPHEN 1000 MG: 500 TABLET, FILM COATED ORAL at 19:06

## 2022-09-27 RX ADMIN — ONDANSETRON 4 MG: 2 INJECTION INTRAMUSCULAR; INTRAVENOUS at 17:47

## 2022-09-27 ASSESSMENT — ACTIVITIES OF DAILY LIVING (ADL)
ADLS_ACUITY_SCORE: 35

## 2022-09-27 NOTE — TELEPHONE ENCOUNTER
Reason for Call:  Other call back    Detailed comments: Pt calling because she has been feeling ill for a couple days. Pt states she cant eat, body hurts, fever, back pain. Pt had a biophysical today and told Dr. Alonzo about her sx but they are getting worse. Pt is 32 wks along.  Phone Number Patient can be reached at: Cell number on file:    Telephone Information:   Mobile [phone]       Best Time:       Can we leave a detailed message on this number? YES    Call taken on 9/27/2022 at 3:29 PM by Breanne Henderson MA

## 2022-09-27 NOTE — TELEPHONE ENCOUNTER
Return call to patient.  Spoke with patient on the phone.    S-(situation): Patient calling as she is feeling sick. Patient reports symptoms are worsening as the day goes on. Patient has not checked a COVID test. No URI symptoms.    B-(background):  32 weeks pregnant    A-(assessment): temperature 101 took tylenol went to 99.2. after Tylenol wears off, temperature goes back up and then down.  Patient reports feeling very sweaty and then cold and shivering.  Tylenol helps her to feels better for about 30-45 min  Has eaten soup.  Chest and upper middle back hurt but no difficulty breathing or SOB   Joint pain.  Headache since yesterday  + nausea this morning, this has improved , no vomiting  Patient able to drink water and keep it down.    No congestion, sore throat or cough.    + fetal movement  No uterine contractions   No bright red bleeding   Rare uterine contractions - nothing labor like.    BPP today 8/8    BP Readings from Last 3 Encounters:   22 115/73   22 106/63   09/10/22 126/84     R-(recommendations): Reassurance provided. Continue to monitor. Tylenol per package directions. Huddled with Dr. Alonzo. She recommends further evaluation in the ER.    Pt in agreement and reports understanding.    Larissa Cartwright   Ob/Gyn Clinic  RN

## 2022-09-27 NOTE — PROGRESS NOTES
"[facility]    Return OB Note    CC: Return OB     Subjective:  Brandee Pink is a 32 year old  at 32w0d   Denies vaginal bleeding, loss of fluid, or regular contractions. Good fetal movement.  Complaints today: Not feeling great today, a little more nausea and headache.     Objective:  /73 (BP Location: Right arm, Patient Position: Sitting, Cuff Size: Adult Regular)   Pulse 110   Temp 98.2  F (36.8  C) (Tympanic)   Resp 14   Ht 1.727 m (5' 8\")   Wt 115.1 kg (253 lb 12.8 oz)   LMP 02/10/2022   BMI 38.59 kg/m      Fundal height: 36cm  FHT: 150bpm    Assessment/Plan:   Encounter Diagnoses   Name Primary?     Insulin controlled gestational diabetes mellitus (GDM) in third trimester Yes     Red blood cell antibody positive      Ductal carcinoma in situ (DCIS) of right breast      Prenatal care, subsequent pregnancy in third trimester        IUP at 32w0d  -GDMA2: Currently on PM NPH, sugars have been suboptimally controlled, fasting this morning 107. Has appointment today for check in. Also might be adding insulin with meals.  survellience has been scheduled for weekly BPP and weekly NST, continue serial growth US. Plan delivery 38-39 weeks.  -History of C section (breech, macrosomia): leaning towards scheduled C section unless spontaneous labor before this. Has been counseled on TOLAC vs. C section. Would like to schedule C section for 38 weeks.   -Obesity: following with serial growth US  -[facility]  -S/p TDap and flu shot  -Strict return precautions given    RTC 1 week    Mary Alice Alonzo DO"

## 2022-09-27 NOTE — ED TRIAGE NOTES
Sunsternal chest pain today going through to the back. Sharp and has a fever. Denies abdomen pain. Reports a fever at home 101. Took Tylenol

## 2022-09-27 NOTE — ED NOTES
Writer called O.B. to come down and monitor patient per Dr. Hernandez request. Patient is 32 weeks pregnant

## 2022-09-27 NOTE — ED PROVIDER NOTES
"  HPI   The patient is a 32-year-old female presenting with \"not feeling well.\"  She was seen by her OB provider today.  She is known to be approximately 32 weeks gestation.  She has had 2 previous pregnancies, 1 delivered vaginally and 1 by  section.  She has a known history of ductal carcinoma involving the breast, mastectomy performed.  She is followed at the [facility].  The patient describes regular, intermittent cramping and discomfort felt in the midline pelvis.  This has been going on for a few weeks.  Since yesterday she has had nausea off and on and epigastric/midline chest discomfort that radiates from the front to the back and vice versa.  She feels pressure in the chest, \"like there is gas in there.\"  No vomiting today.  2 episodes of diarrhea today, no hematochezia or melena.  She has had 2 episodes of longer duration pelvic pressure today.  The first was felt while she was sitting at a computer trying to work and she had to get up and walk around because of the discomfort.  The second is felt here in the ED and is ongoing now.  No vaginal bleeding or discharge out of the ordinary though she does report recent treatment for bacterial vaginosis.  No dysuria, urgency, frequency of urination that is different or out of the ordinary.  She does report having a fever yesterday and today, greater than 101.  She has had headache.  No cough or congestion.  No shortness of breath.  No skin rash.  No myalgia.  No neck pain or stiffness.  No ear pain.  No facial pain or pressure.  No green nasal discharge.  No dental pain.  No other known sick contacts.  No recent travel.        Allergies:  Allergies   Allergen Reactions     Diagnostic X-Ray Materials Shortness Of Breath, Other (See Comments) and Dizziness     Contrast dye-Shortness of breath  Pt became sleepy and confused. Needed to remind the pt to keep breathing, but denied any itching, swelling, or breathing difficulties.     Naproxen Shortness Of " Breath     Phentermine Other (See Comments)     Palpitations, chest pain     Vancomycin Itching, Other (See Comments) and Rash     After few minutes, redness and itching noted in forearm. Symptoms diminished in few minutes after rate decreased by 1/2.    Patient has history of vancomycin infusion reaction. For further doses, vancomycin should be infused at a rate no higher than 10 mg/min or each gram over 100 minutes.  May also consider administering diphenhydramine and famotidine one hour before infusion.  Rash all over body itchy       Kiwi      Problem List:    Patient Active Problem List    Diagnosis Date Noted     Insulin controlled gestational diabetes mellitus (GDM) in third trimester 08/03/2022     Priority: Medium     Elevated one hour high enough to dx GDM@ 229  DE referral sent.          Pregnancy 07/30/2022     Priority: Medium     Prenatal care, subsequent pregnancy 04/19/2022     Priority: Medium     FOB- Jean Claudedeen Son       Red blood cell antibody positive 06/29/2021     Priority: Medium     Anti-M (COLD) identified on 6/25/21  Not clinically significant         History of right mastectomy 06/18/2021     Priority: Medium     Ductal carcinoma in situ (DCIS) of right breast 05/11/2021     Priority: Medium     Dx at time of breast reduction  S/p simple right mastectomy 2021  Followed at [facility]       Estrogen receptor positive status (ER+) 05/11/2021     Priority: Medium     Macromastia 04/26/2021     Priority: Medium     Chest pain 10/04/2020     Priority: Medium     Gastroesophageal reflux disease without esophagitis 10/04/2020     Priority: Medium     Lung nodule 10/04/2020     Priority: Medium     Headaches due to old head trauma 01/15/2020     Priority: Medium     Injury of left shoulder 01/15/2020     Priority: Medium     Mild intermittent asthma without complication 12/27/2017     Priority: Medium      Past Medical History:    Past Medical History:   Diagnosis Date     Anemia      Anti-mi-2  antibody present      Breast cancer (H)      Gastroesophageal reflux disease      Mild intermittent asthma without complication      Obesity      Past Surgical History:    Past Surgical History:   Procedure Laterality Date     AS REDUCTION OF LARGE BREAST  2021      SECTION       CHOLECYSTECTOMY       right mastectomy  2021     Family History:    Family History   Problem Relation Age of Onset     No Known Problems Mother      Other - See Comments Father         murder at age 42     No Known Problems Sister      No Known Problems Sister      No Known Problems Brother      No Known Problems Brother      No Known Problems Maternal Grandmother      No Known Problems Maternal Grandfather      No Known Problems Paternal Grandmother      No Known Problems Paternal Grandfather      Social History:  Marital Status:  Single [1]  Social History     Tobacco Use     Smoking status: Never Smoker     Smokeless tobacco: Never Used   Vaping Use     Vaping Use: Never used   Substance Use Topics     Alcohol use: Not Currently     Comment: very occasional-quit with pregnancy     Drug use: Never      Medications:    nirmatrelvir and ritonavir (PAXLOVID) therapy pack  acetone urine (KETOSTIX) test strip  albuterol (PROAIR HFA/PROVENTIL HFA/VENTOLIN HFA) 108 (90 Base) MCG/ACT inhaler  alcohol swab prep pads  blood glucose (ONETOUCH VERIO IQ) test strip  Blood Glucose Monitoring Suppl (ONETOUCH VERIO FLEX SYSTEM) w/Device KIT  ferrous sulfate (SLO-FE) 142 (45 Fe) MG CR tablet  FOLIC ACID PO  insulin  UNIT/ML injection  insulin pen needle (ULTICARE MICRO) 32G X 4 MM miscellaneous  metroNIDAZOLE (METROGEL) 0.75 % vaginal gel  multivitamin w/minerals (THERA-VIT-M) tablet  ondansetron (ZOFRAN ODT) 4 MG ODT tab  ondansetron (ZOFRAN ODT) 8 MG ODT tab  ondansetron (ZOFRAN-ODT) 4 MG ODT tab  OneTouch Delica Lancets 33G MISC      Review of Systems   All other systems reviewed and are negative.      PE   BP: 128/79  Pulse:  "120  Temp: 99.5  F (37.5  C)  Resp: 20  Height: 172.7 cm (5' 8\")  Weight: 114.8 kg (253 lb)  SpO2: 100 %  Physical Exam  Vitals reviewed.   Constitutional:       General: She is not in acute distress.     Appearance: She is well-developed.      Comments: She will occasionally have discomfort and she holds her chest and epigastrium.  She describes it as pressure and is grimacing.   HENT:      Head: Normocephalic and atraumatic.      Right Ear: External ear normal.      Left Ear: External ear normal.      Nose: Nose normal.      Mouth/Throat:      Mouth: Mucous membranes are moist.      Pharynx: Oropharynx is clear.   Eyes:      Extraocular Movements: Extraocular movements intact.      Conjunctiva/sclera: Conjunctivae normal.      Pupils: Pupils are equal, round, and reactive to light.   Cardiovascular:      Rate and Rhythm: Regular rhythm. Tachycardia present.      Heart sounds: Normal heart sounds.   Pulmonary:      Effort: Pulmonary effort is normal.      Breath sounds: Normal breath sounds.   Abdominal:      Comments: Obviously pregnant.  Some mild tenderness in the suprapubic area and in the epigastrium.   Musculoskeletal:         General: Normal range of motion.      Cervical back: Normal range of motion.      Right lower leg: No tenderness. No edema.      Left lower leg: No tenderness. No edema.   Skin:     General: Skin is warm and dry.   Neurological:      General: No focal deficit present.      Mental Status: She is alert and oriented to person, place, and time.   Psychiatric:         Behavior: Behavior normal.         ED COURSE and MDM   1735.  The patient has multiple symptoms and concerns.  She is 32 weeks gestation and saw her OB provider today though she is complaining of pelvic pressure which is not reported in the provider's note.  Reaching out to OB now.  Lab values pending.  Fluid bolus, Zofran, GI cocktail.  Urine analysis.    1851.  1850.  Patient has COVID-19.  She has received about 500 mL of " normal saline.  We will continue to give fluid until she is able to urinate.  Will provide Tylenol.  Oxygen level is excellent.  No respiratory distress.  Low concern for PE.  She does not have ongoing sharp pain that is pleuritic.  She does not have shortness of breath.  She does not have hemoptysis.  She does not have leg pain or swelling that is new or different.    2004.  COVID-positive as above.  Urine obtained and unremarkable.  Paxlovid prescribed.  Push fluids.  Oxygen monitor provided.  Return for worsening.    EKG  (4774)   Interpretation performed by me.  Rate: 131     Rhythm: sinus     Axis: nl  Intervals: WA (12-2) 130, QRS (<12) 82, QTc (>5) 424  P wave: nl     QRS complex: nl   ST segment / T-wave: T-wave inversion in III and aVF  Conclusion: sinus tachycardia, non-specific T-wave inversion in III and aVF    LABS  Labs Ordered and Resulted from Time of ED Arrival to Time of ED Departure   COMPREHENSIVE METABOLIC PANEL - Abnormal       Result Value    Sodium 137      Potassium 3.7      Chloride 103      Carbon Dioxide (CO2) 23      Anion Gap 11      Urea Nitrogen 6.5      Creatinine 0.61      Calcium 8.9      Glucose 99      Alkaline Phosphatase 157 (*)     AST 61 (*)     ALT 59 (*)     Protein Total 6.8      Albumin 3.4 (*)     Bilirubin Total 0.2      GFR Estimate >90     ROUTINE UA WITH MICROSCOPIC REFLEX TO CULTURE - Abnormal    Color Urine Yellow      Appearance Urine Clear      Glucose Urine Negative      Bilirubin Urine Negative      Ketones Urine Negative      Specific Gravity Urine 1.010      Blood Urine Negative      pH Urine 8.0 (*)     Protein Albumin Urine Negative      Urobilinogen Urine Normal      Nitrite Urine Negative      Leukocyte Esterase Urine Negative      Bacteria Urine Few (*)     Mucus Urine Present (*)     RBC Urine <1      WBC Urine <1      Squamous Epithelials Urine 1     INFLUENZA A/B & SARS-COV2 PCR MULTIPLEX - Abnormal    Influenza A PCR Negative      Influenza B PCR  Negative      SARS CoV2 PCR Positive (*)    CBC WITH PLATELETS AND DIFFERENTIAL - Abnormal    WBC Count 5.8      RBC Count 3.73 (*)     Hemoglobin 10.6 (*)     Hematocrit 33.5 (*)     MCV 90      MCH 28.4      MCHC 31.6      RDW 14.9      Platelet Count 193      % Neutrophils 74      % Lymphocytes 10      % Monocytes 13      % Eosinophils 1      % Basophils 1      % Immature Granulocytes 1      NRBCs per 100 WBC 0      Absolute Neutrophils 4.4      Absolute Lymphocytes 0.6 (*)     Absolute Monocytes 0.8      Absolute Eosinophils 0.1      Absolute Basophils 0.0      Absolute Immature Granulocytes 0.1      Absolute NRBCs 0.0     TROPONIN T, HIGH SENSITIVITY - Normal    Troponin T, High Sensitivity <6         IMAGING  Images reviewed by me.  Radiology report also reviewed.  No orders to display       Procedures    Medications   ondansetron (ZOFRAN) injection 4 mg (4 mg Intravenous Given 9/27/22 1747)   0.9% sodium chloride BOLUS (0 mLs Intravenous Stopped 9/27/22 1950)   lidocaine (viscous) (XYLOCAINE) 2 % 15 mL, alum & mag hydroxide-simethicone (MAALOX) 15 mL GI Cocktail (30 mLs Oral Given 9/27/22 1741)   acetaminophen (TYLENOL) tablet 1,000 mg (1,000 mg Oral Given 9/27/22 1906)         IMPRESSION       ICD-10-CM    1. Infection due to 2019 novel coronavirus  U07.1             Medication List      Started    nirmatrelvir and ritonavir therapy pack  Commonly known as: PAXLOVID  3 tablets, Oral, 2 TIMES DAILY                        William Hernandez MD  09/27/22 2004

## 2022-09-27 NOTE — NURSING NOTE
"Initial /73 (BP Location: Right arm, Patient Position: Sitting, Cuff Size: Adult Regular)   Pulse 110   Temp 98.2  F (36.8  C) (Tympanic)   Resp 14   Ht 1.727 m (5' 8\")   Wt 115.1 kg (253 lb 12.8 oz)   LMP 02/10/2022   BMI 38.59 kg/m   Estimated body mass index is 38.59 kg/m  as calculated from the following:    Height as of this encounter: 1.727 m (5' 8\").    Weight as of this encounter: 115.1 kg (253 lb 12.8 oz). .      "

## 2022-09-28 ENCOUNTER — PREP FOR PROCEDURE (OUTPATIENT)
Dept: OBGYN | Facility: CLINIC | Age: 32
End: 2022-09-28

## 2022-09-28 ENCOUNTER — PATIENT OUTREACH (OUTPATIENT)
Dept: CARE COORDINATION | Facility: CLINIC | Age: 32
End: 2022-09-28

## 2022-09-28 ENCOUNTER — TELEPHONE (OUTPATIENT)
Dept: OBGYN | Facility: CLINIC | Age: 32
End: 2022-09-28

## 2022-09-28 DIAGNOSIS — O34.219 PREGNANCY WITH HISTORY OF CESAREAN SECTION, ANTEPARTUM: Primary | ICD-10-CM

## 2022-09-28 RX ORDER — OXYTOCIN/0.9 % SODIUM CHLORIDE 30/500 ML
340 PLASTIC BAG, INJECTION (ML) INTRAVENOUS CONTINUOUS PRN
Status: CANCELLED | OUTPATIENT
Start: 2022-09-28

## 2022-09-28 RX ORDER — SODIUM CHLORIDE, SODIUM LACTATE, POTASSIUM CHLORIDE, CALCIUM CHLORIDE 600; 310; 30; 20 MG/100ML; MG/100ML; MG/100ML; MG/100ML
INJECTION, SOLUTION INTRAVENOUS CONTINUOUS
Status: CANCELLED | OUTPATIENT
Start: 2022-09-28

## 2022-09-28 RX ORDER — MISOPROSTOL 100 UG/1
400 TABLET ORAL
Status: CANCELLED | OUTPATIENT
Start: 2022-09-28

## 2022-09-28 RX ORDER — OXYTOCIN 10 [USP'U]/ML
10 INJECTION, SOLUTION INTRAMUSCULAR; INTRAVENOUS
Status: CANCELLED | OUTPATIENT
Start: 2022-09-28

## 2022-09-28 RX ORDER — MISOPROSTOL 200 UG/1
800 TABLET ORAL
Status: CANCELLED | OUTPATIENT
Start: 2022-09-28

## 2022-09-28 RX ORDER — CARBOPROST TROMETHAMINE 250 UG/ML
250 INJECTION, SOLUTION INTRAMUSCULAR
Status: CANCELLED | OUTPATIENT
Start: 2022-09-28

## 2022-09-28 RX ORDER — OXYTOCIN/0.9 % SODIUM CHLORIDE 30/500 ML
100-340 PLASTIC BAG, INJECTION (ML) INTRAVENOUS CONTINUOUS PRN
Status: CANCELLED | OUTPATIENT
Start: 2022-09-28

## 2022-09-28 RX ORDER — ACETAMINOPHEN 325 MG/1
975 TABLET ORAL ONCE
Status: CANCELLED | OUTPATIENT
Start: 2022-09-28 | End: 2022-09-28

## 2022-09-28 RX ORDER — METHYLERGONOVINE MALEATE 0.2 MG/ML
200 INJECTION INTRAVENOUS
Status: CANCELLED | OUTPATIENT
Start: 2022-09-28

## 2022-09-28 RX ORDER — LIDOCAINE 40 MG/G
CREAM TOPICAL
Status: CANCELLED | OUTPATIENT
Start: 2022-09-28

## 2022-09-28 RX ORDER — CITRIC ACID/SODIUM CITRATE 334-500MG
30 SOLUTION, ORAL ORAL
Status: CANCELLED | OUTPATIENT
Start: 2022-09-28

## 2022-09-28 NOTE — PROGRESS NOTES
"Clinic Care Coordination Contact  [facility]: Post-Discharge Note  SITUATION                                                      Admission:    Admission Date: 22   Reason for Admission: \"not feeling well  Discharge:   Discharge Date: 22  Discharge Diagnosis: \"not feeling well    BACKGROUND                                                      The patient is a 32-year-old female presenting with \"not feeling well.\"  She was seen by her OB provider today.  She is known to be approximately 32 weeks gestation.  She has had 2 previous pregnancies, 1 delivered vaginally and 1 by  section.  She has a known history of ductal carcinoma involving the breast, mastectomy performed.  She is followed at the [facility].  The patient describes regular, intermittent cramping and discomfort felt in the midline pelvis.  This has been going on for a few weeks.  Since yesterday she has had nausea off and on and epigastric/midline chest discomfort that radiates from the front to the back and vice versa.  She feels pressure in the chest, \"like there is gas in there.\"  No vomiting today.  2 episodes of diarrhea today, no hematochezia or melena.  She has had 2 episodes of longer duration pelvic pressure today.  The first was felt while she was sitting at a computer trying to work and she had to get up and walk around because of the discomfort.  The second is felt here in the ED and is ongoing now.  No vaginal bleeding or discharge out of the ordinary though she does report recent treatment for bacterial vaginosis.  No dysuria, urgency, frequency of urination that is different or out of the ordinary.  She does report having a fever yesterday and today, greater than 101.  She has had headache.  No cough or congestion.  No shortness of breath.  No skin rash.  No myalgia.  No neck pain or stiffness.  No ear pain.  No facial pain or pressure.  No green nasal discharge.  No dental pain.  No other known sick contacts.  " No recent travel.    ASSESSMENT           Discharge Assessment  How are you doing now that you are home?: Patient shares that she is feeling better and temp is not as high this morning. Declines needs or concerns at this time.  How are your symptoms? (Red Flag symptoms escalate to triage hotline per guidelines): Improved  Do you feel your condition is stable enough to be safe at home until your provider visit?: Yes  Does the patient have their discharge instructions? : Yes  Does the patient have questions regarding their discharge instructions? : No  Were you started on any new medications or were there changes to any of your previous medications? : Yes  Does the patient have all of their medications?: Yes  Do you have questions regarding any of your medications? : No  Do you have all of your needed medical supplies or equipment (DME)?  (i.e. oxygen tank, CPAP, cane, etc.): Yes  Discharge follow-up appointment scheduled within 14 calendar days? : Yes  Discharge Follow Up Appointment Date: 10/04/22  Discharge Follow Up Appointment Scheduled with?: Specialty Care Provider    Post-op ([facility] Only)  If the patient had a surgery or procedure, do they have any questions for a nurse?: No    Post-op (Clinicians Only)  Did the patient have surgery or a procedure: No  Fever: No  Chills: No      PLAN                                                       Outpatient Plan:  Schedule an appointment with your primary care clinic in 2 days (9/29/2022)        Future Appointments   Date Time Provider Department Center   10/4/2022  7:40 AM RAYRAY [facility]   10/4/2022  9:00 AM Umu Rosales MD [facility]   10/6/2022  7:30 AM [facility]   10/6/2022  9:00 AM Gwyn Ram Nurse - [facility]   10/7/2022  3:00 PM CASTILLO [place]  2 MEL SENDY AT[facility]   10/7/2022  3:30 PM CASTILLO AGUIRRE [facility]   10/11/2022  8:20 AM [facility]   10/11/2022  9:30 AM Umu Rosales MD [facility]    10/13/2022  9:00 AM [place], Ob Nurse - [facility]   10/18/2022  7:40 AM W[facility] [place]   10/18/2022 10:30 AM Jonathan Schmidt MD [facility]   10/20/2022  7:30 AM Genesis Mcarthur PT [facility]   10/20/2022  9:[facility]   10/25/2022  8:20 AM UMESH1 [facility] [place]         For any urgent concerns, please contact our 24 hour nurse triage line: [phone] ([phone])         SAMANTHA Horowitz

## 2022-09-28 NOTE — PROGRESS NOTES
S: Called to ED to monitor patient. Patient placed on EFM to monitor FHT and uterine activity  B:  at 32w0d, GDM A2, Hx of c/s, patient covid positive  A: , baby active, accels present to 170, difficult to maintain tracing due to maternal habitus and coughing and movement. Variability moderate decreased to minimal variability. No decels audible or noted on monitor. No regular contractions, mild intermittent irritability, patient denies pelvic pressure or contractions at present, states that feels better.  O2 saturation 100% on RA, c/o chest pain an heaviness..  R: , no regular contractions. Patient has scheduled growth scan and follow up appointments and BPP.

## 2022-09-28 NOTE — DISCHARGE INSTRUCTIONS
RETURN TO THE EMERGENCY ROOM FOR THE FOLLOWING:    Dehydration, severely worsened breathing, or at anytime for any concern.    FOLLOW UP:    Remain in isolation for 5 days or longer if fever persist beyond that time.    TREATMENT RECOMMENDATIONS:    Consider Paxil elevated.  This is an antiviral medication which can help reduce severity of illness.  If you are not going to use it it should be started soon as possible.    Tylenol as needed for fever and comfort.    Push hydration by mouth.    NURSE ADVICE LINE:  [phone] or [phone]

## 2022-09-28 NOTE — TELEPHONE ENCOUNTER
"[id number]  Brandee Nesbitt    You are now scheduled for surgery at [facility].  Below are the details for your surgery.  Please read the \"Preparing for Your Surgery\" instructions and let us know if you have any questions.    Type of surgery:  SECTION    Surgeon:  Umu Rosales MD    Location of surgery: [facility] OR    Date of surgery: 11-15-22    Time: 7:30am   Arrival Time: 6:00am    Time can change, to be confirmed a couple of days prior by pre-op surgery nurse.    Pre-Op Appt Date: Patient to schedule with a PCP or Family Practice Provider within 30 days to the surgery.  Post-Op Appt Date: To be determined by provider     *For overnight Surgery - PCR Covid-19 test from a lab required 2-4 days prior.  See \"testing for Covid-19 handout\"    *For Same Day Surgery Covid-19 test required 1-2 days prior.  See \"testing for Covid-19 handout\"    Packet sent out: Yes  Pre-cert/Authorization completed:  TBD by Financial Securing Office.   MA Sterilization/Hysterectomy Acknowledgment Consent signed: Not Applicable    [facility]  [phone]    Fax: [phone]  Same Day Surgery [phone]  Fax: [phone]  [facility] [phone]    "

## 2022-09-29 ENCOUNTER — MYC MEDICAL ADVICE (OUTPATIENT)
Dept: NUTRITION | Facility: CLINIC | Age: 32
End: 2022-09-29

## 2022-09-29 DIAGNOSIS — O24.414 INSULIN CONTROLLED GESTATIONAL DIABETES MELLITUS (GDM) IN SECOND TRIMESTER: ICD-10-CM

## 2022-09-29 NOTE — TELEPHONE ENCOUNTER
Gestational Diabetes Follow-up    Subjective/Objective:    Brandee Nesbitt sent in blood glucose log for review. Last date of communication was: 9/15/22.    Gestational diabetes is being managed with diet, activity and medications    Taking diabetes medications:   yes:     Diabetes Medication(s)     Insulin       insulin  UNIT/ML injection    22 units once per day at bedtime.     Patient taking differently: Inject 22 Units Subcutaneous At Bedtime 22 units once per day at bedtime.          Estimated Date of Delivery: Nov 22, 2022, 32w2d    BG/Food Log:         Assessment:  Blood sugars elevated.  Recommend Dose increase and add in daytime NPH.     Ketones: negative.   Fasting blood glucoses: 44% in target.  After breakfast: 27% in target.  After lunch: 56% in target.  After dinner: 44% in target.    Plan/Response:  Recommend increase to insulin - NPH 0-0-0-22 --> 4-0-0-26, above protocol increase requested from provider.     Taylor Martines MS, RD, LD, CDE      Any diabetes medication dose changes were made via the CDE Protocol and Collaborative Practice Agreement with the patient's OB/GYN provider. A copy of this encounter was shared with the provider.

## 2022-10-01 ENCOUNTER — NURSE TRIAGE (OUTPATIENT)
Dept: NURSING | Facility: CLINIC | Age: 32
End: 2022-10-01

## 2022-10-02 NOTE — TELEPHONE ENCOUNTER
Patient was diagnosed with covid.  She was seen in ED and was started on paxlovid.  Tomorrow is patients last dose      Patient had a lot of itching hands, palms, feet    Nurse Triage SBAR    Is this a 2nd Level Triage? YES, LICENSED PRACTITIONER REVIEW IS REQUIRED    Situation:   Patient is having severe itching all over her body.    Background:   Patient is 32 weeks pregnant.  She was positive for covid on 9/27.  Was seen in ED and started on Paxlovid.  Patient symptoms are much better.    Assessment:   Yesterday patient developed severe itching all over her body.  She took a shower and applied some body butter and it helped a little but tonight the itching has increased. It is mostly on the bottom of her feet and palms of her hands.    Patient denies any breathing issues, no swallowing issues, no fever, no visible rash.    Protocol Recommended Disposition:   No disposition on file.    Recommendation:   What would you recommend?     Take benadryl and apply hydrocortisone cream.  Follow up with provider on 10/4 at scheduled appointment.    Paged to provider Dr Milian    Does the patient meet one of the following criteria for ADS visit consideration? 16+ years old, with an [facility] PCP     TIP  Providers, please consider if this condition is appropriate for management at one of our Acute and Diagnostic Services sites.     If patient is a good candidate, please use dotphrase <dot>triageresponse and select Refer to ADS to document.      Provider Recommendation Follow Up:   Reached patient/caregiver. Informed of provider's recommendations. Patient verbalized understanding and agrees with the plan.         Reason for Disposition    [1] MODERATE-SEVERE widespread itching (i.e., interferes with sleep, normal activities or school) AND [2] not improved after 24 hours of itching Care Advice    Additional Information    Negative: [1] Life-threatening reaction (anaphylaxis) in the past to similar substance (e.g., food, insect  bite/sting, chemical, etc.) AND [2] < 2 hours since exposure    Negative: Difficulty breathing or wheezing    Negative: [1] Difficulty swallowing or slurred speech AND [2] sudden onset    Negative: Sounds like a life-threatening emergency to the triager    Negative: Could be severe allergic reaction    Negative: Insect bites suspected    Negative: Looks like hives (pink bumps with pale centers)    Negative: Yellowish color of the skin AND sclera (white of the eye)    Negative: Itching in just one area or spot    Negative: Widespread rash also present    Negative: Patient sounds very sick or weak to the triager    Protocols used: ITCHING - WIDESPREAD-A-

## 2022-10-03 ENCOUNTER — LAB (OUTPATIENT)
Dept: LAB | Facility: CLINIC | Age: 32
End: 2022-10-03
Payer: COMMERCIAL

## 2022-10-03 ENCOUNTER — VIRTUAL VISIT (OUTPATIENT)
Dept: OBGYN | Facility: CLINIC | Age: 32
End: 2022-10-03

## 2022-10-03 DIAGNOSIS — Z34.83 PRENATAL CARE, SUBSEQUENT PREGNANCY IN THIRD TRIMESTER: ICD-10-CM

## 2022-10-03 DIAGNOSIS — L29.9 ITCHING: ICD-10-CM

## 2022-10-03 DIAGNOSIS — L29.9 ITCHING: Primary | ICD-10-CM

## 2022-10-03 LAB
ALBUMIN SERPL BCG-MCNC: 3.4 G/DL (ref 3.5–5.2)
ALP SERPL-CCNC: 193 U/L (ref 35–104)
ALT SERPL W P-5'-P-CCNC: 231 U/L (ref 10–35)
AST SERPL W P-5'-P-CCNC: 168 U/L (ref 10–35)
BILIRUB DIRECT SERPL-MCNC: 0.25 MG/DL (ref 0–0.3)
BILIRUB SERPL-MCNC: 0.4 MG/DL
PROT SERPL-MCNC: 7 G/DL (ref 6.4–8.3)

## 2022-10-03 PROCEDURE — 99212 OFFICE O/P EST SF 10 MIN: CPT | Performed by: OBSTETRICS & GYNECOLOGY

## 2022-10-03 PROCEDURE — 36415 COLL VENOUS BLD VENIPUNCTURE: CPT

## 2022-10-03 PROCEDURE — 99000 SPECIMEN HANDLING OFFICE-LAB: CPT

## 2022-10-03 PROCEDURE — 83789 MASS SPECTROMETRY QUAL/QUAN: CPT | Mod: 90

## 2022-10-03 PROCEDURE — 80076 HEPATIC FUNCTION PANEL: CPT

## 2022-10-03 RX ORDER — URSODIOL 300 MG/1
300 CAPSULE ORAL 2 TIMES DAILY
Qty: 60 CAPSULE | Refills: 3 | Status: ON HOLD | OUTPATIENT
Start: 2022-10-03 | End: 2022-10-27

## 2022-10-03 NOTE — PROGRESS NOTES
Joesph is a 32 year old who is being evaluated via a billable telephone visit.      What phone number would you like to be contacted at? [phone]  How would you like to obtain your AVS? Cesar     Phone call duration: 10 minutes    [facility]     Return OB Note     CC: Return OB      Subjective:  Brandee Pink is a 32 year old  at 32w6d    Denies vaginal bleeding, loss of fluid, or regular contractions. Pos fetal movement. No headache, visual disturbance or RUQ pain.  Complaints today:    1) recently covid positive 5 days ago, feeling improved, denies current symptoms   2) significant body itching, unable to sleep, worse on soles of feet, no improvement with benadryl, no rash     Objective:  Deferred VV     Assessment/Plan:   32 year old  at 32w6d     -Prior  section.  Repeat  scheduled  -GDMA2 - increasing insulin, states BS have improved after most recent increase, plan twice weekly BPPs, serial growth USs  -itching- concerning for cholestasis, bile acids ordered, given lack of improvement with benadryl will start presumptive ursodiol, discussed discontinuing if no elevated bile acids found, already planning BPPs given GDMA2  -Return precautions given  -Discussed  labor and fetal movement precautions.  -Tdap done      RTC 2 weeks     Lily Woodard MD   10/3/2022. 1:54 PM

## 2022-10-04 ENCOUNTER — HOSPITAL ENCOUNTER (OUTPATIENT)
Dept: ULTRASOUND IMAGING | Facility: CLINIC | Age: 32
Discharge: HOME | End: 2022-10-04
Attending: OBSTETRICS & GYNECOLOGY | Admitting: OBSTETRICS & GYNECOLOGY
Payer: COMMERCIAL

## 2022-10-04 DIAGNOSIS — Z34.83 ENCOUNTER FOR SUPERVISION OF OTHER NORMAL PREGNANCY IN THIRD TRIMESTER: ICD-10-CM

## 2022-10-04 PROCEDURE — 76819 FETAL BIOPHYS PROFIL W/O NST: CPT

## 2022-10-06 ENCOUNTER — ALLIED HEALTH/NURSE VISIT (OUTPATIENT)
Dept: OBGYN | Facility: CLINIC | Age: 32
End: 2022-10-06
Payer: COMMERCIAL

## 2022-10-06 DIAGNOSIS — O24.414 INSULIN CONTROLLED GESTATIONAL DIABETES MELLITUS (GDM) IN THIRD TRIMESTER: ICD-10-CM

## 2022-10-06 DIAGNOSIS — R74.8 ELEVATED LIVER ENZYMES: Primary | ICD-10-CM

## 2022-10-06 DIAGNOSIS — Z34.83 PRENATAL CARE, SUBSEQUENT PREGNANCY IN THIRD TRIMESTER: ICD-10-CM

## 2022-10-06 DIAGNOSIS — Z34.83 ENCOUNTER FOR SUPERVISION OF OTHER NORMAL PREGNANCY IN THIRD TRIMESTER: ICD-10-CM

## 2022-10-06 PROCEDURE — 59025 FETAL NON-STRESS TEST: CPT | Performed by: OBSTETRICS & GYNECOLOGY

## 2022-10-06 PROCEDURE — 99207 PR NO CHARGE NURSE ONLY: CPT

## 2022-10-06 NOTE — PROGRESS NOTES
Fetal Non-Stress Test:  Performed due to presumed cholestasis of pregnancy  Baseline 130, mod fatou, +accles, no decels  [place]: none    Cat 1 reactive NST  Shannon Milian MD  OB/GYN

## 2022-10-07 ENCOUNTER — ANCILLARY PROCEDURE (OUTPATIENT)
Dept: ULTRASOUND IMAGING | Facility: HOSPITAL | Age: 32
End: 2022-10-07
Attending: OBSTETRICS & GYNECOLOGY
Payer: COMMERCIAL

## 2022-10-07 ENCOUNTER — OFFICE VISIT (OUTPATIENT)
Dept: MATERNAL FETAL MEDICINE | Facility: HOSPITAL | Age: 32
End: 2022-10-07
Attending: OBSTETRICS & GYNECOLOGY
Payer: COMMERCIAL

## 2022-10-07 DIAGNOSIS — O26.643 CHOLESTASIS DURING PREGNANCY IN THIRD TRIMESTER: Primary | ICD-10-CM

## 2022-10-07 DIAGNOSIS — O24.414 INSULIN CONTROLLED GESTATIONAL DIABETES MELLITUS (GDM) DURING PREGNANCY, ANTEPARTUM: ICD-10-CM

## 2022-10-07 DIAGNOSIS — O35.9XX0 FETAL ABNORMALITY AFFECTING MANAGEMENT OF MOTHER, ANTEPARTUM, SINGLE OR UNSPECIFIED FETUS: ICD-10-CM

## 2022-10-07 PROCEDURE — 99212 OFFICE O/P EST SF 10 MIN: CPT | Mod: 25 | Performed by: OBSTETRICS & GYNECOLOGY

## 2022-10-07 PROCEDURE — 76819 FETAL BIOPHYS PROFIL W/O NST: CPT | Mod: 26 | Performed by: OBSTETRICS & GYNECOLOGY

## 2022-10-07 PROCEDURE — 76816 OB US FOLLOW-UP PER FETUS: CPT | Mod: 26 | Performed by: OBSTETRICS & GYNECOLOGY

## 2022-10-07 PROCEDURE — 76816 OB US FOLLOW-UP PER FETUS: CPT

## 2022-10-07 PROCEDURE — 76819 FETAL BIOPHYS PROFIL W/O NST: CPT

## 2022-10-07 NOTE — PROGRESS NOTES
"Please see \"Imaging\" tab under Chart Review for full details.    Danae Leslie MD  Maternal Fetal Medicine    "

## 2022-10-08 ENCOUNTER — HOSPITAL ENCOUNTER (OUTPATIENT)
Facility: CLINIC | Age: 32
Discharge: HOME | End: 2022-10-08
Attending: OBSTETRICS & GYNECOLOGY | Admitting: OBSTETRICS & GYNECOLOGY
Payer: COMMERCIAL

## 2022-10-08 ENCOUNTER — NURSE TRIAGE (OUTPATIENT)
Dept: NURSING | Facility: CLINIC | Age: 32
End: 2022-10-08

## 2022-10-08 ENCOUNTER — TELEPHONE (OUTPATIENT)
Dept: NURSING | Facility: CLINIC | Age: 32
End: 2022-10-08

## 2022-10-08 VITALS
HEART RATE: 100 BPM | TEMPERATURE: 98 F | DIASTOLIC BLOOD PRESSURE: 67 MMHG | SYSTOLIC BLOOD PRESSURE: 114 MMHG | RESPIRATION RATE: 16 BRPM

## 2022-10-08 PROCEDURE — G0463 HOSPITAL OUTPT CLINIC VISIT: HCPCS

## 2022-10-08 PROCEDURE — 59025 FETAL NON-STRESS TEST: CPT

## 2022-10-08 PROCEDURE — 59025 FETAL NON-STRESS TEST: CPT | Mod: 26 | Performed by: OBSTETRICS & GYNECOLOGY

## 2022-10-08 NOTE — TELEPHONE ENCOUNTER
OB Triage Call      Is patient's OB/Midwife with the [facility] or [facility]? [facility]- Proceed with triage     Reason for call: Decreased fetal movent, non since 8-9 am this morning      Assessment: Triaged to ED L&D    Plan:     Patient plans to deliver at [facility]    Patient's primary OB Provider is Dr. mUu Martin       Per protocol recommendations Patient to be evaluated in L&D. Patient's primary OB is [facility].  Labor and delivery at [facility] ([phone]) notified of patient's pending arrival.  Report given to RN.      Is patient's delivering hospital on divert? No      33w4d    Estimated Date of Delivery: 2022        OB History    Para Term  AB Living   3 2 2 0 0 2   SAB IAB Ectopic Multiple Live Births   0 0 0 0 2      # Outcome Date GA Lbr Marvin/2nd Weight Sex Delivery Anes PTL Lv   3 Current            2 Term 14 40w0d  4.848 kg (10 lb 11 oz) F CS-Unspec   SURAJ      Birth Comments: Breech      Name: Melvyna   1 Term 08 40w0d  3.402 kg (7 lb 8 oz) F Vag-Spont   SURAJ      Name: Edrina       No results found for: GBS       CRISTIANE HUSSEIN RN 10/08/22 12:18 PM  [facility]

## 2022-10-08 NOTE — TELEPHONE ENCOUNTER
"Pt calling in to nurse triage with decreased fetal movement. Pt is 33 weeks and 4 day gestation. Pt report no fetal movement at all since 8-9 am. Pt reports that fetus is generally very active and moves a lot and also is very strong with movement. Reports weaker movements this morning and no no movements.  Dispostion to L&D/ED now. RN spoke with ON- call MD Dr. Schmidt  Who agreed with disposition.     Pt. Typically sees Dr. Umu Martin and has a scheduled c section with her on 11/15/22.  RN notified [place] L&D of Pt impending arrival.  Pt contacted and will proceed there now, has a ride to transport her.    Merlyn Blackwell, JENNIFER, [place], PHN on 10/8/2022 at 12:46 PM  [facility]  RN utilized sound nursing judgement based on facility triage protocols during this encounter.           Reason for Disposition    [1] Pregnant 23 or more weeks AND [2] baby moving less today by kick count  (e.g., kick count < 5 in 1 hour or < 10 in 2 hours)    Additional Information    Negative: Sounds like a life-threatening emergency to the triager    Negative: Injury to abdomen    Negative: [1] Pregnant > 36 weeks AND [2] having contractions or other symptoms of labor    Negative: [1] Pregnant < 37 weeks AND [2] having contractions or other symptoms of labor    Negative: [1] Pregnant > 20 weeks AND [2] abdominal pain    Negative: [1] Pregnant > 20 weeks AND [2] vaginal bleeding or spotting    Negative: New blurred vision or vision changes    Negative: [1] SEVERE headache AND [2] not relieved with acetaminophen (e.g., Tylenol)    Negative: Leakage of fluid from vagina    Answer Assessment - Initial Assessment Questions  1. FETAL MOVEMENT: \"Has the baby's movement decreased or changed significantly from normal?\" (e.g., yes, no; describe)      Changed significantly from normal - only weak weaker movements this morning   2. MARY ELLEN: \"What date are you expecting to deliver?\"        planned for 2022    3. " "PREGNANCY: \"How many weeks pregnant are you?\"       33.3 weeks     4. OTHER SYMPTOMS: \"Do you have any other symptoms?\" (e.g., abdominal pain, leaking fluid from vagina, vaginal bleeding, etc.)      no    Protocols used: PREGNANCY - DECREASED FETAL MOVEMENT-A-AH    COVID 19 Nurse Triage Plan/Patient Instructions    Please be aware that novel coronavirus (COVID-19) may be circulating in the community. If you develop symptoms such as fever, cough, or SOB or if you have concerns about the presence of another infection including coronavirus (COVID-19), please contact your health care provider or visit [email or web address].     Disposition/Instructions    ED Visit recommended. Follow protocol based instructions.     Bring Your Own Device:  Please also bring your smart device(s) (smart phones, tablets, laptops) and their charging cables for your personal use and to communicate with your care team during your visit.    Thank you for taking steps to prevent the spread of this virus.  o Limit your contact with others.  o Wear a simple mask to cover your cough.  o Wash your hands well and often.    Resources    [facility]: About COVID-19: [email or web address]    CDC: What to Do If You're Sick: www.cdc.gov/coronavirus/2019-ncov/about/steps-when-sick.html    CDC: Ending Home Isolation: www.cdc.gov/coronavirus/2019-ncov/hcp/disposition-in-home-patients.html     CDC: Caring for Someone: www.cdc.gov/coronavirus/2019-ncov/if-you-are-sick/care-for-someone.html     [facility]: Interim Guidance for Hospital Discharge to Home: www.health.[facility].[place]/diseases/coronavirus/hcp/hospdischarge.pdf    [facility] clinical trials (COVID-19 research studies): [email or web address]     Below are the COVID-19 hotlines at the [place] Department of Health ([facility]). Interpreters are available.   o For health questions: Call 009-600-5099 or 1-779.554.9171 (7 a.m. to 7 p.m.)  o For questions about " schools and childcare: Call 684-219-1278 or [phone] (7 a.m. to 7 p.m.)

## 2022-10-08 NOTE — PROGRESS NOTES
Reactive fetal heart tracing. No contractions. Has felt fetal movement since being placed on the monitor. Dr. Schmidt updated; telephone order for discharge placed. Reviewed discharge instructions with patient and significant other. Verbalized understanding and agreement. Ambulated off unit at 1416

## 2022-10-08 NOTE — PROGRESS NOTES
Patient arrived from home with concern for decreased fetal movement. Hasn't felt movement since 0800 and felt like fetal movements were not as strong as usual this morning. FHTs found in the 130s. Maternal vitals stable. Patient reports a slight headache that she attributes to stress; no vision changes. Reports that her itching has gotten better. Fasting blood sugar this morning was 82; is able to keep better control of her blood sugars now that she has recovered from COVID and has gone up on her bedtime insulin. Is not feeling any residual effects from COVID. Significant other at the bedside.

## 2022-10-10 LAB
BILE AC SERPL-SCNC: >93.4 UMOL/L
CDCAE SERPL-SCNC: 14.2 UMOL/L
CHOLATE SERPL-SCNC: >75 UMOL/L
DO-CHOLATE SERPL-SCNC: 3.9 UMOL/L
URSODEOXYCHOLATE SERPL-SCNC: 0.3 UMOL/L

## 2022-10-11 ENCOUNTER — PRENATAL OFFICE VISIT (OUTPATIENT)
Dept: OBGYN | Facility: CLINIC | Age: 32
End: 2022-10-11
Payer: COMMERCIAL

## 2022-10-11 ENCOUNTER — HOSPITAL ENCOUNTER (OUTPATIENT)
Dept: ULTRASOUND IMAGING | Facility: CLINIC | Age: 32
Discharge: HOME | End: 2022-10-11
Attending: OBSTETRICS & GYNECOLOGY | Admitting: OBSTETRICS & GYNECOLOGY
Payer: COMMERCIAL

## 2022-10-11 VITALS
DIASTOLIC BLOOD PRESSURE: 67 MMHG | RESPIRATION RATE: 20 BRPM | HEART RATE: 101 BPM | SYSTOLIC BLOOD PRESSURE: 108 MMHG | HEIGHT: 68 IN | BODY MASS INDEX: 38.71 KG/M2 | TEMPERATURE: 97.3 F | WEIGHT: 255.4 LBS

## 2022-10-11 DIAGNOSIS — O26.643 CHOLESTASIS DURING PREGNANCY IN THIRD TRIMESTER: ICD-10-CM

## 2022-10-11 DIAGNOSIS — Z34.83 ENCOUNTER FOR SUPERVISION OF OTHER NORMAL PREGNANCY IN THIRD TRIMESTER: ICD-10-CM

## 2022-10-11 DIAGNOSIS — Z3A.34 34 WEEKS GESTATION OF PREGNANCY: ICD-10-CM

## 2022-10-11 DIAGNOSIS — O24.414 INSULIN CONTROLLED GESTATIONAL DIABETES MELLITUS (GDM) IN THIRD TRIMESTER: Primary | ICD-10-CM

## 2022-10-11 LAB
ALBUMIN SERPL BCG-MCNC: 3.4 G/DL (ref 3.5–5.2)
ALP SERPL-CCNC: 179 U/L (ref 35–104)
ALT SERPL W P-5'-P-CCNC: 89 U/L (ref 10–35)
AST SERPL W P-5'-P-CCNC: 28 U/L (ref 10–35)
BILIRUB DIRECT SERPL-MCNC: <0.2 MG/DL (ref 0–0.3)
BILIRUB SERPL-MCNC: 0.2 MG/DL
PROT SERPL-MCNC: 7.1 G/DL (ref 6.4–8.3)

## 2022-10-11 PROCEDURE — 83789 MASS SPECTROMETRY QUAL/QUAN: CPT | Mod: 90 | Performed by: OBSTETRICS & GYNECOLOGY

## 2022-10-11 PROCEDURE — 99207 PR PRENATAL VISIT: CPT | Performed by: OBSTETRICS & GYNECOLOGY

## 2022-10-11 PROCEDURE — 80076 HEPATIC FUNCTION PANEL: CPT | Performed by: OBSTETRICS & GYNECOLOGY

## 2022-10-11 PROCEDURE — 36415 COLL VENOUS BLD VENIPUNCTURE: CPT | Performed by: OBSTETRICS & GYNECOLOGY

## 2022-10-11 PROCEDURE — 99000 SPECIMEN HANDLING OFFICE-LAB: CPT | Performed by: OBSTETRICS & GYNECOLOGY

## 2022-10-11 PROCEDURE — 76819 FETAL BIOPHYS PROFIL W/O NST: CPT

## 2022-10-11 NOTE — NURSING NOTE
"Initial /67 (BP Location: Left arm, Patient Position: Chair, Cuff Size: Adult Large)   Pulse 101   Temp 97.3  F (36.3  C) (Tympanic)   Resp 20   Ht 1.727 m (5' 7.99\")   Wt 115.8 kg (255 lb 6.4 oz)   LMP 02/10/2022   BMI 38.84 kg/m   Estimated body mass index is 38.84 kg/m  as calculated from the following:    Height as of this encounter: 1.727 m (5' 7.99\").    Weight as of this encounter: 115.8 kg (255 lb 6.4 oz). .    Delaney Rausch CMA    "

## 2022-10-11 NOTE — PROGRESS NOTES
"[facility]    Return OB Note    CC: Return OB     Subjective:  Brandee Pink is a 32 year old   at 34w0d   Denies vaginal bleeding, loss of fluid, or regular contractions. Pos fetal movement. No headache, visual disturbance or RUQ pain.  Complaints today: wants to discuss timing of delivery.  Had MFM consult done.  Thinks her LFTs are elevated due to covid.  Has itching, more at night.  Wakes up scratching.  Medications have helped. Not sure she understood MFM recommendations for delivery, if it needs to be moved up.    Objective:  /67 (BP Location: Left arm, Patient Position: Chair, Cuff Size: Adult Large)   Pulse 101   Temp 97.3  F (36.3  C) (Tympanic)   Resp 20   Ht 1.727 m (5' 7.99\")   Wt 115.8 kg (255 lb 6.4 oz)   LMP 02/10/2022   BMI 38.84 kg/m      See flowsheet      Assessment/Plan:       32 year old year old  female with IUP at 34w0d  Encounter Diagnoses   Name Primary?     Insulin controlled gestational diabetes mellitus (GDM) in third trimester Yes     34 weeks gestation of pregnancy      Cholestasis during pregnancy in third trimester           MDGDMA2: Currently on PM NPH, sugars have been suboptimally controlled, fasting this morning 107. Has appointment today for check in. Also might be adding insulin with meals.  survellience has been scheduled for weekly BPP and weekly NST, continue serial growth US. Plan delivery 38-39 weeks.  -History of C section (breech, macrosomia): leaning towards scheduled C section unless spontaneous labor before this. Has been counseled on TOLAC vs. C section. Would like to schedule C section for 38 weeks.   -Obesity: following with serial growth US  -Hx DCIS s/p right mastectomy, follows with [facility]  -RBC antibody positive: clinically insignificant  -S/p TDap and flu shot  -covid during pregnancy  -cholestasis  MFM recommendations:   -Evaluation for chronic hepatitis, such as hepatitis C and B (neg)   - " fetal surveillance with twice weekly BPP (also due to gestational diabetes on insulin)   -Daily fetal kick counts   -Re-evaluate bile acids and LFTs at 35 weeks gestation to guide timing of delivery, with consideration for repeat testing at 36 weeks, if clinically indicated.      Timing of delivery should be based on peak total bile acids.   -If peak total bile acids 10 - 99 micromol/L, delivery at 36w0d - 39w0d   -If peak total bile acids ? 100 micromol/L, delivery at 36w0d - 36w6d   -If peak total bile acids ? 100 micromol/L and one the following criteria, consider delivery between 34w0d - 36w0d   -Excruciating/unremitting pruritus not relieved with pharmacotherapy   -Prior history of stillbirth before 36 weeks due to ICP with recurring ICP in the current pregnancy   -Preexisting or acute hepatic disease with clinical or laboratory evidence of worsening hepatic function   -If delivery less than 37w0d is planned, betamethasone administration for fetal lung maturity should be considered.   - delivery should be reserved for routine obstetric indication   -Consider re-checking bile acids and liver function tests at 6 weeks postpartum; if still abnormal, recommend referral to Hepatology.    We had a discussion about timing of delivery today. Her last bile acid result was >93.4.  Due to previous increase in LFTs, I recommended repeat labs today.  Her LFTs improved today.  She does have itching, but it has improved with medications.  Will await repeat bile acids to decide timing of delivery.  We discussed early delivery and the possibility of steroids.  We also discussed that steroids may worsen her BS control.  At this point, as her LFTs improved today, will await bile acids and she was given strict prenatal precautions.  She has twice weekly BPPs and a follow up doctor visit in a week.

## 2022-10-11 NOTE — RESULT ENCOUNTER NOTE
Results discussed directly with patient while patient was present. Any further details documented in the note.   Umu Rosales MD

## 2022-10-13 ENCOUNTER — ALLIED HEALTH/NURSE VISIT (OUTPATIENT)
Dept: OBGYN | Facility: CLINIC | Age: 32
End: 2022-10-13
Payer: COMMERCIAL

## 2022-10-13 DIAGNOSIS — O26.643 CHOLESTASIS DURING PREGNANCY IN THIRD TRIMESTER: Primary | ICD-10-CM

## 2022-10-13 DIAGNOSIS — Z34.83 PRENATAL CARE, SUBSEQUENT PREGNANCY IN THIRD TRIMESTER: ICD-10-CM

## 2022-10-13 PROCEDURE — 99207 PR NO CHARGE NURSE ONLY: CPT

## 2022-10-14 LAB
BILE AC SERPL-SCNC: 8.8 UMOL/L
CDCAE SERPL-SCNC: 1.9 UMOL/L
CHOLATE SERPL-SCNC: 0.7 UMOL/L
DO-CHOLATE SERPL-SCNC: 1.5 UMOL/L
URSODEOXYCHOLATE SERPL-SCNC: 4.7 UMOL/L

## 2022-10-18 ENCOUNTER — HOSPITAL ENCOUNTER (OUTPATIENT)
Dept: ULTRASOUND IMAGING | Facility: CLINIC | Age: 32
Discharge: HOME | End: 2022-10-18
Attending: OBSTETRICS & GYNECOLOGY | Admitting: OBSTETRICS & GYNECOLOGY
Payer: COMMERCIAL

## 2022-10-18 ENCOUNTER — TELEPHONE (OUTPATIENT)
Dept: OBGYN | Facility: CLINIC | Age: 32
End: 2022-10-18

## 2022-10-18 ENCOUNTER — PRENATAL OFFICE VISIT (OUTPATIENT)
Dept: OBGYN | Facility: CLINIC | Age: 32
End: 2022-10-18
Payer: COMMERCIAL

## 2022-10-18 VITALS
SYSTOLIC BLOOD PRESSURE: 107 MMHG | TEMPERATURE: 98 F | HEART RATE: 92 BPM | DIASTOLIC BLOOD PRESSURE: 73 MMHG | BODY MASS INDEX: 38.43 KG/M2 | HEIGHT: 68 IN | RESPIRATION RATE: 14 BRPM | WEIGHT: 253.6 LBS

## 2022-10-18 DIAGNOSIS — O24.414 INSULIN CONTROLLED GESTATIONAL DIABETES MELLITUS (GDM) IN THIRD TRIMESTER: ICD-10-CM

## 2022-10-18 DIAGNOSIS — O26.643 CHOLESTASIS DURING PREGNANCY IN THIRD TRIMESTER: ICD-10-CM

## 2022-10-18 DIAGNOSIS — Z86.16 HISTORY OF COVID-19: ICD-10-CM

## 2022-10-18 DIAGNOSIS — Z34.83 ENCOUNTER FOR SUPERVISION OF OTHER NORMAL PREGNANCY IN THIRD TRIMESTER: ICD-10-CM

## 2022-10-18 DIAGNOSIS — Z98.891 HISTORY OF CESAREAN SECTION: ICD-10-CM

## 2022-10-18 DIAGNOSIS — R76.8 RED BLOOD CELL ANTIBODY POSITIVE: ICD-10-CM

## 2022-10-18 DIAGNOSIS — Z34.83 PRENATAL CARE, SUBSEQUENT PREGNANCY IN THIRD TRIMESTER: Primary | ICD-10-CM

## 2022-10-18 LAB
ALBUMIN SERPL BCG-MCNC: 3.5 G/DL (ref 3.5–5.2)
ALP SERPL-CCNC: 185 U/L (ref 35–104)
ALT SERPL W P-5'-P-CCNC: 31 U/L (ref 10–35)
AST SERPL W P-5'-P-CCNC: 21 U/L (ref 10–35)
BILIRUB DIRECT SERPL-MCNC: <0.2 MG/DL (ref 0–0.3)
BILIRUB SERPL-MCNC: 0.2 MG/DL
PROT SERPL-MCNC: 6.9 G/DL (ref 6.4–8.3)

## 2022-10-18 PROCEDURE — 83789 MASS SPECTROMETRY QUAL/QUAN: CPT | Mod: 90 | Performed by: OBSTETRICS & GYNECOLOGY

## 2022-10-18 PROCEDURE — 36415 COLL VENOUS BLD VENIPUNCTURE: CPT | Performed by: OBSTETRICS & GYNECOLOGY

## 2022-10-18 PROCEDURE — 87653 STREP B DNA AMP PROBE: CPT | Performed by: OBSTETRICS & GYNECOLOGY

## 2022-10-18 PROCEDURE — 80076 HEPATIC FUNCTION PANEL: CPT | Performed by: OBSTETRICS & GYNECOLOGY

## 2022-10-18 PROCEDURE — 99207 PR PRENATAL VISIT: CPT | Performed by: OBSTETRICS & GYNECOLOGY

## 2022-10-18 PROCEDURE — 99000 SPECIMEN HANDLING OFFICE-LAB: CPT | Performed by: OBSTETRICS & GYNECOLOGY

## 2022-10-18 PROCEDURE — 76819 FETAL BIOPHYS PROFIL W/O NST: CPT

## 2022-10-18 NOTE — PROGRESS NOTES
"[facility]    Return OB Note    CC: Return OB     Subjective:  Brandee Pink is a 32 year old  at 35w0d   Denies vaginal bleeding, loss of fluid, or regular contractions. Good fetal movement.  Complaints today: None    Objective:  /73 (BP Location: Right arm, Patient Position: Sitting, Cuff Size: Adult Large)   Pulse 92   Temp 98  F (36.7  C) (Tympanic)   Resp 14   Ht 1.727 m (5' 7.99\")   Wt 115 kg (253 lb 9.6 oz)   LMP 02/10/2022   BMI 38.57 kg/m      Fundal height: 39cm  FHT: 130bpm  SVE: 1/40/-3    Assessment/Plan:   Encounter Diagnoses   Name Primary?     Prenatal care, subsequent pregnancy in third trimester Yes     Red blood cell antibody positive      Insulin controlled gestational diabetes mellitus (GDM) in third trimester      Cholestasis during pregnancy in third trimester      History of COVID-19      History of  section        IUP at 35w0d  -GDMA2: Currently on PM NPH and AM short acting with breakfast, sugars have been in better control over the past week, continuing to titrate insulin as needed. Continue serial growth US,  survellience has been scheduled for weekly BPP and weekly NST.  -History of C section (breech, macrosomia): Planning repeat C section, scheduled as below  -LGA: EFW 97%, AC>99%, concern for suboptimal control of blood sugars  -Obesity: following with serial growth US  -Hx DCIS s/p right mastectomy, follows with [facility]  -RBC antibody positive: clinically insignificant  -S/p TDap and flu shot  -covid during pregnancy: following with serial growth US  -Cholestasis: thought to be COVID induced with greatly improved labs last week, will recheck today, taking Actigall. Discussed case with Dr. Ruvalcaba of [facility]. Patient needs to be managed based on maximum bile acid numbers, even with improvement on medications. Would recommend delivery 36-37 weeks. C section scheduled for 22 at 37 weeks. Continue to monitor closely.  -GBS " collected today  -Strict return precautions given    RTC 1 weeks    Mary Alice Alonzo DO

## 2022-10-18 NOTE — TELEPHONE ENCOUNTER
needs to be moved earlier per Mary Alice Alonzo DO.  Scheduled for 11-15-22 but needs to be moved up to 22 with Umu Rosales MD.    Pt informed  moved to 22 10:30am  Time is tentative -     -Crys Christensen  Clinic Station

## 2022-10-18 NOTE — LETTER
[facility]   [address] [place] [address]  [phone]    2022      RE:Brandee Nesbitt  & Akil Son                                                                           [address] [place] [address]            To Whom It May Concern:      Brandee Nesbitt ( 1990) is a patient here at [facility]. She is scheduled for her  on 22. Her spouse, Akil Son, will be her and their newborns primary care giver.      Sincerely,      Umu Rosales MD

## 2022-10-18 NOTE — TELEPHONE ENCOUNTER
now moved up to 22 per Mary Alice Alonzo, DO with Shannon Milian MD.  Pt informed.  SDS informed.  De [place] updated.    -Crys Christensen  Clinic Station

## 2022-10-18 NOTE — NURSING NOTE
"Initial /73 (BP Location: Right arm, Patient Position: Sitting, Cuff Size: Adult Large)   Pulse 92   Temp 98  F (36.7  C) (Tympanic)   Resp 14   Ht 1.727 m (5' 7.99\")   Wt 115 kg (253 lb 9.6 oz)   LMP 02/10/2022   BMI 38.57 kg/m   Estimated body mass index is 38.57 kg/m  as calculated from the following:    Height as of this encounter: 1.727 m (5' 7.99\").    Weight as of this encounter: 115 kg (253 lb 9.6 oz). .      "

## 2022-10-19 LAB — GP B STREP DNA SPEC QL NAA+PROBE: POSITIVE

## 2022-10-20 ENCOUNTER — ALLIED HEALTH/NURSE VISIT (OUTPATIENT)
Dept: OBGYN | Facility: CLINIC | Age: 32
End: 2022-10-20
Payer: COMMERCIAL

## 2022-10-20 VITALS — HEART RATE: 78 BPM | SYSTOLIC BLOOD PRESSURE: 108 MMHG | DIASTOLIC BLOOD PRESSURE: 59 MMHG

## 2022-10-20 DIAGNOSIS — Z34.83 ENCOUNTER FOR SUPERVISION OF OTHER NORMAL PREGNANCY IN THIRD TRIMESTER: ICD-10-CM

## 2022-10-20 PROCEDURE — 99207 PR NO CHARGE NURSE ONLY: CPT

## 2022-10-20 PROCEDURE — 59025 FETAL NON-STRESS TEST: CPT | Performed by: OBSTETRICS & GYNECOLOGY

## 2022-10-20 NOTE — PROGRESS NOTES
Patient came into clinic for a Nurse Only NST appointment. Patient was placed on NST Monitor for 20 min. Strip removed from machine and shown to Dr. Woodard. Verbal approval to take patient off NST and send home given. NST strip placed in Dr. Schmidt (on call MD) to be reviewed and documented. Routed epic chart to Dr. Schmidt (on call MD) to document.     Delaney Rausch CMA

## 2022-10-21 LAB
BILE AC SERPL-SCNC: 10 UMOL/L
CDCAE SERPL-SCNC: 0.8 UMOL/L
CHOLATE SERPL-SCNC: 0.6 UMOL/L
DO-CHOLATE SERPL-SCNC: 0.4 UMOL/L
URSODEOXYCHOLATE SERPL-SCNC: 8.2 UMOL/L

## 2022-10-23 ENCOUNTER — HOSPITAL ENCOUNTER (INPATIENT)
Facility: CLINIC | Age: 32
LOS: 1 days | Discharge: ANOTHER HOSPITAL | End: 2022-10-24
Attending: OBSTETRICS & GYNECOLOGY | Admitting: STUDENT IN AN ORGANIZED HEALTH CARE EDUCATION/TRAINING PROGRAM
Payer: COMMERCIAL

## 2022-10-23 ENCOUNTER — NURSE TRIAGE (OUTPATIENT)
Dept: NURSING | Facility: CLINIC | Age: 32
End: 2022-10-23

## 2022-10-23 DIAGNOSIS — Z98.891 S/P CESAREAN SECTION: Primary | ICD-10-CM

## 2022-10-23 DIAGNOSIS — Z98.891 HISTORY OF CESAREAN SECTION: ICD-10-CM

## 2022-10-23 LAB
ABO/RH(D): ABNORMAL
ALBUMIN SERPL BCG-MCNC: 3.1 G/DL (ref 3.5–5.2)
ALBUMIN UR-MCNC: NEGATIVE MG/DL
ALP SERPL-CCNC: 183 U/L (ref 35–104)
ALT SERPL W P-5'-P-CCNC: 29 U/L (ref 10–35)
ANION GAP SERPL CALCULATED.3IONS-SCNC: 11 MMOL/L (ref 7–15)
ANTIBODY SCREEN: POSITIVE
APPEARANCE UR: CLEAR
AST SERPL W P-5'-P-CCNC: 30 U/L (ref 10–35)
BACTERIA #/AREA URNS HPF: ABNORMAL /HPF
BILIRUB SERPL-MCNC: 0.3 MG/DL
BILIRUB UR QL STRIP: NEGATIVE
BLD PROD TYP BPU: NORMAL
BLD PROD TYP BPU: NORMAL
BLOOD COMPONENT TYPE: NORMAL
BLOOD COMPONENT TYPE: NORMAL
BUN SERPL-MCNC: 5.2 MG/DL (ref 6–20)
CALCIUM SERPL-MCNC: 9.1 MG/DL (ref 8.6–10)
CHLORIDE SERPL-SCNC: 103 MMOL/L (ref 98–107)
CODING SYSTEM: NORMAL
CODING SYSTEM: NORMAL
COLOR UR AUTO: YELLOW
CREAT SERPL-MCNC: 0.68 MG/DL (ref 0.51–0.95)
CROSSMATCH: NORMAL
CROSSMATCH: NORMAL
DEPRECATED HCO3 PLAS-SCNC: 20 MMOL/L (ref 22–29)
ERYTHROCYTE [DISTWIDTH] IN BLOOD BY AUTOMATED COUNT: 14.5 % (ref 10–15)
GFR SERPL CREATININE-BSD FRML MDRD: >90 ML/MIN/1.73M2
GLUCOSE BLDC GLUCOMTR-MCNC: 130 MG/DL (ref 70–99)
GLUCOSE BLDC GLUCOMTR-MCNC: 76 MG/DL (ref 70–99)
GLUCOSE SERPL-MCNC: 107 MG/DL (ref 70–99)
GLUCOSE UR STRIP-MCNC: 50 MG/DL
HCT VFR BLD AUTO: 33.4 % (ref 35–47)
HGB BLD-MCNC: 10.5 G/DL (ref 11.7–15.7)
HGB UR QL STRIP: NEGATIVE
HOLD SPECIMEN: NORMAL
KETONES UR STRIP-MCNC: NEGATIVE MG/DL
LEUKOCYTE ESTERASE UR QL STRIP: NEGATIVE
MCH RBC QN AUTO: 27.2 PG (ref 26.5–33)
MCHC RBC AUTO-ENTMCNC: 31.4 G/DL (ref 31.5–36.5)
MCV RBC AUTO: 87 FL (ref 78–100)
NITRATE UR QL: NEGATIVE
PH UR STRIP: 6 [PH] (ref 5–7)
PLATELET # BLD AUTO: 219 10E3/UL (ref 150–450)
POTASSIUM SERPL-SCNC: 4 MMOL/L (ref 3.4–5.3)
PROT SERPL-MCNC: 6.8 G/DL (ref 6.4–8.3)
RBC # BLD AUTO: 3.86 10E6/UL (ref 3.8–5.2)
RBC URINE: <1 /HPF
SODIUM SERPL-SCNC: 134 MMOL/L (ref 136–145)
SP GR UR STRIP: 1.01 (ref 1–1.03)
SPECIMEN EXPIRATION DATE: ABNORMAL
SQUAMOUS EPITHELIAL: 1 /HPF
UNIT ABO/RH: NORMAL
UNIT ABO/RH: NORMAL
UNIT NUMBER: NORMAL
UNIT NUMBER: NORMAL
UNIT STATUS: NORMAL
UNIT STATUS: NORMAL
UNIT TYPE ISBT: 7300
UNIT TYPE ISBT: 7300
UROBILINOGEN UR STRIP-MCNC: 4 MG/DL
WBC # BLD AUTO: 5.5 10E3/UL (ref 4–11)
WBC URINE: <1 /HPF

## 2022-10-23 PROCEDURE — 96372 THER/PROPH/DIAG INJ SC/IM: CPT | Performed by: STUDENT IN AN ORGANIZED HEALTH CARE EDUCATION/TRAINING PROGRAM

## 2022-10-23 PROCEDURE — 86921 COMPATIBILITY TEST INCUBATE: CPT | Performed by: STUDENT IN AN ORGANIZED HEALTH CARE EDUCATION/TRAINING PROGRAM

## 2022-10-23 PROCEDURE — 86901 BLOOD TYPING SEROLOGIC RH(D): CPT | Performed by: STUDENT IN AN ORGANIZED HEALTH CARE EDUCATION/TRAINING PROGRAM

## 2022-10-23 PROCEDURE — 250N000011 HC RX IP 250 OP 636: Performed by: STUDENT IN AN ORGANIZED HEALTH CARE EDUCATION/TRAINING PROGRAM

## 2022-10-23 PROCEDURE — 250N000012 HC RX MED GY IP 250 OP 636 PS 637: Performed by: STUDENT IN AN ORGANIZED HEALTH CARE EDUCATION/TRAINING PROGRAM

## 2022-10-23 PROCEDURE — 86780 TREPONEMA PALLIDUM: CPT | Performed by: STUDENT IN AN ORGANIZED HEALTH CARE EDUCATION/TRAINING PROGRAM

## 2022-10-23 PROCEDURE — 86900 BLOOD TYPING SEROLOGIC ABO: CPT | Performed by: STUDENT IN AN ORGANIZED HEALTH CARE EDUCATION/TRAINING PROGRAM

## 2022-10-23 PROCEDURE — 86850 RBC ANTIBODY SCREEN: CPT | Performed by: STUDENT IN AN ORGANIZED HEALTH CARE EDUCATION/TRAINING PROGRAM

## 2022-10-23 PROCEDURE — 86922 COMPATIBILITY TEST ANTIGLOB: CPT | Performed by: STUDENT IN AN ORGANIZED HEALTH CARE EDUCATION/TRAINING PROGRAM

## 2022-10-23 PROCEDURE — 85027 COMPLETE CBC AUTOMATED: CPT | Performed by: STUDENT IN AN ORGANIZED HEALTH CARE EDUCATION/TRAINING PROGRAM

## 2022-10-23 PROCEDURE — 82962 GLUCOSE BLOOD TEST: CPT

## 2022-10-23 PROCEDURE — 86870 RBC ANTIBODY IDENTIFICATION: CPT | Performed by: STUDENT IN AN ORGANIZED HEALTH CARE EDUCATION/TRAINING PROGRAM

## 2022-10-23 PROCEDURE — 81001 URINALYSIS AUTO W/SCOPE: CPT | Performed by: STUDENT IN AN ORGANIZED HEALTH CARE EDUCATION/TRAINING PROGRAM

## 2022-10-23 PROCEDURE — 80053 COMPREHEN METABOLIC PANEL: CPT | Performed by: STUDENT IN AN ORGANIZED HEALTH CARE EDUCATION/TRAINING PROGRAM

## 2022-10-23 PROCEDURE — 86880 COOMBS TEST DIRECT: CPT | Performed by: STUDENT IN AN ORGANIZED HEALTH CARE EDUCATION/TRAINING PROGRAM

## 2022-10-23 PROCEDURE — 86920 COMPATIBILITY TEST SPIN: CPT | Performed by: STUDENT IN AN ORGANIZED HEALTH CARE EDUCATION/TRAINING PROGRAM

## 2022-10-23 PROCEDURE — 84999 UNLISTED CHEMISTRY PROCEDURE: CPT | Performed by: STUDENT IN AN ORGANIZED HEALTH CARE EDUCATION/TRAINING PROGRAM

## 2022-10-23 PROCEDURE — 250N000013 HC RX MED GY IP 250 OP 250 PS 637: Performed by: STUDENT IN AN ORGANIZED HEALTH CARE EDUCATION/TRAINING PROGRAM

## 2022-10-23 PROCEDURE — 86905 BLOOD TYPING RBC ANTIGENS: CPT | Performed by: STUDENT IN AN ORGANIZED HEALTH CARE EDUCATION/TRAINING PROGRAM

## 2022-10-23 PROCEDURE — 86902 BLOOD TYPE ANTIGEN DONOR EA: CPT | Performed by: STUDENT IN AN ORGANIZED HEALTH CARE EDUCATION/TRAINING PROGRAM

## 2022-10-23 PROCEDURE — 36415 COLL VENOUS BLD VENIPUNCTURE: CPT | Performed by: STUDENT IN AN ORGANIZED HEALTH CARE EDUCATION/TRAINING PROGRAM

## 2022-10-23 PROCEDURE — 258N000003 HC RX IP 258 OP 636: Performed by: STUDENT IN AN ORGANIZED HEALTH CARE EDUCATION/TRAINING PROGRAM

## 2022-10-23 RX ORDER — PROCHLORPERAZINE 25 MG
25 SUPPOSITORY, RECTAL RECTAL EVERY 12 HOURS PRN
Status: DISCONTINUED | OUTPATIENT
Start: 2022-10-23 | End: 2022-10-24 | Stop reason: HOSPADM

## 2022-10-23 RX ORDER — ONDANSETRON 2 MG/ML
4 INJECTION INTRAMUSCULAR; INTRAVENOUS EVERY 6 HOURS PRN
Status: DISCONTINUED | OUTPATIENT
Start: 2022-10-23 | End: 2022-10-24 | Stop reason: HOSPADM

## 2022-10-23 RX ORDER — METOCLOPRAMIDE 10 MG/1
10 TABLET ORAL EVERY 6 HOURS PRN
Status: DISCONTINUED | OUTPATIENT
Start: 2022-10-23 | End: 2022-10-24 | Stop reason: HOSPADM

## 2022-10-23 RX ORDER — URSODIOL 300 MG/1
300 CAPSULE ORAL 2 TIMES DAILY
Status: DISCONTINUED | OUTPATIENT
Start: 2022-10-23 | End: 2022-10-24

## 2022-10-23 RX ORDER — ONDANSETRON 4 MG/1
4 TABLET, ORALLY DISINTEGRATING ORAL EVERY 6 HOURS PRN
Status: DISCONTINUED | OUTPATIENT
Start: 2022-10-23 | End: 2022-10-24 | Stop reason: HOSPADM

## 2022-10-23 RX ORDER — PROCHLORPERAZINE MALEATE 10 MG
10 TABLET ORAL EVERY 6 HOURS PRN
Status: DISCONTINUED | OUTPATIENT
Start: 2022-10-23 | End: 2022-10-24 | Stop reason: HOSPADM

## 2022-10-23 RX ORDER — HYDROXYZINE HYDROCHLORIDE 50 MG/1
50 TABLET, FILM COATED ORAL ONCE
Status: COMPLETED | OUTPATIENT
Start: 2022-10-23 | End: 2022-10-23

## 2022-10-23 RX ORDER — MORPHINE SULFATE 10 MG/ML
10 INJECTION, SOLUTION INTRAMUSCULAR; INTRAVENOUS ONCE
Status: DISCONTINUED | OUTPATIENT
Start: 2022-10-23 | End: 2022-10-23

## 2022-10-23 RX ORDER — DEXTROSE MONOHYDRATE 25 G/50ML
25-50 INJECTION, SOLUTION INTRAVENOUS
Status: DISCONTINUED | OUTPATIENT
Start: 2022-10-23 | End: 2022-10-24 | Stop reason: HOSPADM

## 2022-10-23 RX ORDER — NICOTINE POLACRILEX 4 MG
15-30 LOZENGE BUCCAL
Status: DISCONTINUED | OUTPATIENT
Start: 2022-10-23 | End: 2022-10-24 | Stop reason: HOSPADM

## 2022-10-23 RX ORDER — ACETAMINOPHEN 325 MG/1
650 TABLET ORAL EVERY 4 HOURS PRN
Status: DISCONTINUED | OUTPATIENT
Start: 2022-10-23 | End: 2022-10-24

## 2022-10-23 RX ORDER — METOCLOPRAMIDE HYDROCHLORIDE 5 MG/ML
10 INJECTION INTRAMUSCULAR; INTRAVENOUS EVERY 6 HOURS PRN
Status: DISCONTINUED | OUTPATIENT
Start: 2022-10-23 | End: 2022-10-24 | Stop reason: HOSPADM

## 2022-10-23 RX ORDER — MORPHINE SULFATE 10 MG/ML
10 INJECTION, SOLUTION INTRAMUSCULAR; INTRAVENOUS ONCE
Status: COMPLETED | OUTPATIENT
Start: 2022-10-23 | End: 2022-10-23

## 2022-10-23 RX ADMIN — HYDROXYZINE HYDROCHLORIDE 50 MG: 50 TABLET, FILM COATED ORAL at 20:06

## 2022-10-23 RX ADMIN — INSULIN HUMAN 13 UNITS: 100 INJECTION, SUSPENSION SUBCUTANEOUS at 22:06

## 2022-10-23 RX ADMIN — ACETAMINOPHEN 650 MG: 325 TABLET, FILM COATED ORAL at 18:41

## 2022-10-23 RX ADMIN — MORPHINE SULFATE 10 MG: 10 INJECTION, SOLUTION INTRAMUSCULAR; INTRAVENOUS at 20:06

## 2022-10-23 RX ADMIN — SODIUM CHLORIDE, POTASSIUM CHLORIDE, SODIUM LACTATE AND CALCIUM CHLORIDE 1000 ML: 600; 310; 30; 20 INJECTION, SOLUTION INTRAVENOUS at 16:56

## 2022-10-23 ASSESSMENT — ACTIVITIES OF DAILY LIVING (ADL)
WEAR_GLASSES_OR_BLIND: YES
DIFFICULTY_EATING/SWALLOWING: NO
ADLS_ACUITY_SCORE: 20
DRESSING/BATHING_DIFFICULTY: NO
CONCENTRATING,_REMEMBERING_OR_MAKING_DECISIONS_DIFFICULTY: NO
FALL_HISTORY_WITHIN_LAST_SIX_MONTHS: NO
WALKING_OR_CLIMBING_STAIRS_DIFFICULTY: NO
ADLS_ACUITY_SCORE: 20
ADLS_ACUITY_SCORE: 20
CHANGE_IN_FUNCTIONAL_STATUS_SINCE_ONSET_OF_CURRENT_ILLNESS/INJURY: NO
ADLS_ACUITY_SCORE: 20
TOILETING_ISSUES: NO
DOING_ERRANDS_INDEPENDENTLY_DIFFICULTY: NO

## 2022-10-23 NOTE — PLAN OF CARE
Goal Outcome Evaluation:             35+5 gestation, here from home, instructed by triage line to come in and be assessed for labor. History of C/S, has one scheduled for 22.  Pt states having contractions that stop her from talking and walking. No leaking fluid, no bleeding.   GDM, GBS+, B+, R mastectomy, BMI of 38.57     SVE 2/50-60/-3    Cat 1 tracing, irregular contraction pattern.  Collecting covid swab, IV fluids, labs and reassess SVE in one hour.  Will continue to assess and monitor

## 2022-10-23 NOTE — H&P
[facility]  OB History and Physical      Name: Brandee Nesbitt MRN#: [id number]   Age: 32 year old YOB: 1990      Assessment and Plan:   Brandee Nesbitt is a 32 year old , at 35w5d by 10w2d US, who presents with regular and painful contractions at home and is here for rule out of  labor.     Ruling out  labor  -No distinct contractions noted on tocometer.  Patient appears in pain with uterine irritability but not to the point of inability to talk or breathe.  Her cervix was checked x2 by the same RN and was unchanged.  Patient does not appear to be in  labor at this time.  -Admission for overnight observation because of patient's history of prior , painful contractions despite conservative management at home. We discussed below options:      - Returning home tonight, which I recommended against because patient's pain is severe enough to bring her in despite home conservative management. I did not feel patient's contractions yolanda CS at 35w5d yet.      - If her pain is not alleviated by IM morphine and PO hydroxyzine overnight, it would be evidence that her contractions are possibly causing her labor pain even if her cervix is not changing.   -1 L LR bolus and continuous overnight fetal monitoring.  -IM Morphine 10mg and PO hydroxyzine 50 mg for therapeutic sleep overnight.    GDMA2, on NPH 26 units at bedtime and 4 units before breakfast  -CLD. NPH 13 units at bedtime and holding 4 units before breakfast till further evaluation.  -Continue QID blood glucose check: fasting, 1 or 2 hours postprandial     History of CSx1 for breech and macrosomia  -Planning a repeat  if patient is in  labor.    Intrahepatic cholestasis of pregnancy  -Continue ursodiol 300 mg twice daily  -[facility] recommendations: Delivery timing requires shared decision-making       - For patients with bile acids >100, recommend offering  delivery at 36 0/7 wks, given that the risk of stillbirth increased significantly around this gestational age. (Grade 1B)       - For patients with bile acids <100, recommend delivery between 36 0/7 and 39 0/7 wks. (Grade 1C). For women with BA <40, reasonable to manage towards the later end, earlier end if >40  - Patient's peak total bile acid was >93.4 on 10/3. This leave us room to interpretation because the bile acid could have easily been >100. This would make delivery at 36w0d plausible. Dr. Alonzo talked to Dr. Ruvalcaba, who recommended delivery 36-37wk.     Class II obesity  -Pharmacologic DVT prophylaxis is needed if .    Hx DCIS s/p right mastectomy  -Lactation consultation after delivery if patient desires breast-feeding.    clinically insignif RBC antibody  -Type and cross x2 units for RBCs.    COVID positive on 2022  -No retesting for COVID upon admission.    Asthma  -Avoid Hemabate.     FWB   Cat I tracing  - Continuous Fetal Monitoring  - NICU for delivery        Rosalee Turk MD  Office phone: [phone]  Pager: [phone]  Obstetrics and Gynecology  [facility]   10/23/2022      HPI:   Brandee Nesbitt is a 32 year old , at 35w5d by 10w2d US, who presents with painful contractions.    Irregular but painful contractions that started last night.  Patient was able to fall asleep after trying hydration.  Today, her contractions worsen - q2min and painful despite trying hydration and resting.    Notes normal fetal movement. Denies vaginal bleeding or gush of fluid like rupture of membrane. Denies dysuria, change in urinary frequency, change in urinary urgency, or hematuria.         ROS:   Complete 10-point ROS negative except as noted in HPI.       OB History:     Pregnancy Complications:  - GDMA2: On NPH 26 units at bedtime and 4 units before breakfast  - History of CSx1 for breech and macrosomia  - Suspected LGA  - Class II obesity  - Hx DCIS s/p right  mastectomy  - Intrahepatic cholestasis of pregnancy, on ursodiol 300 mg twice daily  - GBS positive status  - clinically insignif RBC antibody  - COVID positive on 2022  - Asthma: Usually needs to use albuterol inhaler only in winter times    Prenatal Labs:   T&S: B+, antibody positive  Hgb: 10.6 on 2022  Plt: 193 on 2022  GCT: failed (229 on 8/3/2022)  GBS: Positive  Rubella: immune on 2022  HIV/HepB/RPR: neg on 2022  AST: 21 on 10/18/2022  ALT: 31 on 10/18/2022  PAP smear: NILM 2018    OB History    Para Term  AB Living   3 2 2 0 0 2   SAB IAB Ectopic Multiple Live Births   0 0 0 0 2      # Outcome Date GA Lbr Marvin/2nd Weight Sex Delivery Anes PTL Lv   3 Current            2 Term 14 40w0d  4.848 kg (10 lb 11 oz) F CS-Unspec   SURAJ      Birth Comments: Breech      Name: Khang   1 Term 08 40w0d  3.402 kg (7 lb 8 oz) F Vag-Spont   SURAJ      Name: Prakash          Past Medical History:     Past Medical History:   Diagnosis Date     Anemia      Anti-mi-2 antibody present     anti-M (cold) red blood cell antibody     Breast cancer (H)      Gastroesophageal reflux disease      Mild intermittent asthma without complication      Obesity           Past Surgical History:     Past Surgical History:   Procedure Laterality Date     AS REDUCTION OF LARGE BREAST  2021      SECTION       CHOLECYSTECTOMY       right mastectomy  2021          Social History:     Social History     Tobacco Use     Smoking status: Never     Smokeless tobacco: Never   Substance Use Topics     Alcohol use: Not Currently     Comment: very occasional-quit with pregnancy          Family History:     Family History   Problem Relation Age of Onset     No Known Problems Mother      Other - See Comments Father         murder at age 42     No Known Problems Sister      No Known Problems Sister      No Known Problems Brother      No Known Problems Brother      No Known Problems Maternal  Grandmother      No Known Problems Maternal Grandfather      No Known Problems Paternal Grandmother      No Known Problems Paternal Grandfather           Immunizations:     Immunization History   Administered Date(s) Administered     COVID-19,PF,Pfizer (12+ Yrs) 08/10/2021, 08/31/2021     Flu, Unspecified 10/28/2019, 10/01/2021     Influenza Vaccine IM > 6 months Valent IIV4 (Alfuria,Fluzone) 10/28/2019, 09/09/2020, 10/01/2021, 09/06/2022     Pneumococcal 23 valent 08/07/2020     Tdap (Adacel,Boostrix) 08/07/2020, 09/06/2022          Allergies:     Allergies   Allergen Reactions     Diagnostic X-Ray Materials Shortness Of Breath, Other (See Comments) and Dizziness     Contrast dye-Shortness of breath  Pt became sleepy and confused. Needed to remind the pt to keep breathing, but denied any itching, swelling, or breathing difficulties.     Naproxen Shortness Of Breath     Phentermine Other (See Comments)     Palpitations, chest pain     Vancomycin Itching, Other (See Comments) and Rash     After few minutes, redness and itching noted in forearm. Symptoms diminished in few minutes after rate decreased by 1/2.    Patient has history of vancomycin infusion reaction. For further doses, vancomycin should be infused at a rate no higher than 10 mg/min or each gram over 100 minutes.  May also consider administering diphenhydramine and famotidine one hour before infusion.  Rash all over body itchy       Kiwi           Medications:     Medications Prior to Admission   Medication Sig Dispense Refill Last Dose     acetone urine (KETOSTIX) test strip Use to check urine for ketones every morning. 50 strip 3      albuterol (PROAIR HFA/PROVENTIL HFA/VENTOLIN HFA) 108 (90 Base) MCG/ACT inhaler Inhale 1-2 puffs into the lungs (Patient not taking: No sig reported)        alcohol swab prep pads Use to swab area of injection/wendy as directed. 100 each 3      blood glucose (ONETOUCH VERIO IQ) test strip Use to test blood sugar 4 times  daily (before breakfast and 1 hour after the start of each meal). 150 strip 4      Blood Glucose Monitoring Suppl (ONETOUCH VERIO FLEX SYSTEM) w/Device KIT 1 each 4 times daily 1 kit 0      ferrous sulfate (SLO-FE) 142 (45 Fe) MG CR tablet Take 1 tablet (142 mg) by mouth daily 30 tablet 2      FOLIC ACID PO Take 1 mg by mouth daily        insulin  UNIT/ML injection 4 units before breakfast and 26 units at bedtime. 30 mL 3      insulin pen needle (ULTICARE MICRO) 32G X 4 MM miscellaneous Use 1 pen needle daily or as directed. 50 each 3      multivitamin w/minerals (THERA-VIT-M) tablet Take 1 tablet by mouth daily (Patient not taking: No sig reported)        ondansetron (ZOFRAN ODT) 4 MG ODT tab Take 1 tablet (4 mg) by mouth every 8 hours as needed for nausea (Patient not taking: Reported on 10/18/2022) 20 tablet 0      ondansetron (ZOFRAN ODT) 8 MG ODT tab Take 1 tablet (8 mg) by mouth every 8 hours as needed for nausea (Patient not taking: No sig reported) 20 tablet 4      ondansetron (ZOFRAN-ODT) 4 MG ODT tab Take 2 tablets (8 mg) by mouth every 8 hours as needed for nausea 10 tablet 0      OneTouch Delica Lancets 33G MISC 1 each 4 times daily 200 each 4      ursodiol (ACTIGALL) 300 MG capsule Take 1 capsule (300 mg) by mouth 2 times daily 60 capsule 3           PE:   Vit:   Patient Vitals for the past 4 hrs:   BP Temp Temp src Pulse SpO2   10/23/22 1603 -- 98.5  F (36.9  C) Oral 106 99 %   10/23/22 1600 119/68 -- -- -- --      Gen: Well-appearing, NAD, comfortable   CV: Well-perfused  Pulm: Normal respiratory efforts  Abd: Soft, gravid, non-tender  Ext: Trace LE edema b/l  Cx: 2/long/high x2 (1603, 1715 by the same RN)    FHT: Baseline 130, moderate variability, accelerations present, no decelerations   Mc[place]ister: Uterine irritability with maybe 1 contractions in 10 minutes         Labs/Imagings:     Recent Results (from the past 24 hour(s))   CBC with platelets    Collection Time: 10/23/22  5:02 PM   Result  Value Ref Range    WBC Count 5.5 4.0 - 11.0 10e3/uL    RBC Count 3.86 3.80 - 5.20 10e6/uL    Hemoglobin 10.5 (L) 11.7 - 15.7 g/dL    Hematocrit 33.4 (L) 35.0 - 47.0 %    MCV 87 78 - 100 fL    MCH 27.2 26.5 - 33.0 pg    MCHC 31.4 (L) 31.5 - 36.5 g/dL    RDW 14.5 10.0 - 15.0 %    Platelet Count 219 150 - 450 10e3/uL   Comprehensive metabolic panel    Collection Time: 10/23/22  5:02 PM   Result Value Ref Range    Sodium 134 (L) 136 - 145 mmol/L    Potassium 4.0 3.4 - 5.3 mmol/L    Chloride 103 98 - 107 mmol/L    Carbon Dioxide (CO2) 20 (L) 22 - 29 mmol/L    Anion Gap 11 7 - 15 mmol/L    Urea Nitrogen 5.2 (L) 6.0 - 20.0 mg/dL    Creatinine 0.68 0.51 - 0.95 mg/dL    Calcium 9.1 8.6 - 10.0 mg/dL    Glucose 107 (H) 70 - 99 mg/dL    Alkaline Phosphatase 183 (H) 35 - 104 U/L    AST 30 10 - 35 U/L    ALT 29 10 - 35 U/L    Protein Total 6.8 6.4 - 8.3 g/dL    Albumin 3.1 (L) 3.5 - 5.2 g/dL    Bilirubin Total 0.3 <=1.2 mg/dL    GFR Estimate >90 >60 mL/min/1.73m2

## 2022-10-23 NOTE — TELEPHONE ENCOUNTER
OB Triage Call      Is patient's OB/Midwife with the [facility] or [facility]? [facility]- Proceed with triage     Reason for call: Cramping that is coming and going every 2 min today, started last night.    Assessment: Denies LOF, vaginal bleeding. Possibly lost her mucous plug yesterday AM. Good fetal movement.  No history of PTL.  Rest does not help.   Supposed to be scheduled for repeat C/S 2022     Plan: To labor and delivery now.    Patient plans to deliver at [facility]    Patient's primary OB Provider is  Dr Alonzo    Per protocol recommendations Patient to be evaluated in L&D. Patient's primary OB is [facility].  Labor and delivery at [facility] ([phone]) notified of patient's pending arrival.  Report given to JENNIFER Aguilera.      Is patient's delivering hospital on divert? No      35w5d    Estimated Date of Delivery: 2022        OB History    Para Term  AB Living   3 2 2 0 0 2   SAB IAB Ectopic Multiple Live Births   0 0 0 0 2      # Outcome Date GA Lbr Marvin/2nd Weight Sex Delivery Anes PTL Lv   3 Current            2 Term 14 40w0d  4.848 kg (10 lb 11 oz) F CS-Unspec   SURAJ      Birth Comments: Breech      Name: Khang   1 Term 08 40w0d  3.402 kg (7 lb 8 oz) F Vag-Spont   SURAJ      Name: Prakash       No results found for: GBS       Halie Renteria 10/23/22 2:54 PM  [facility]    Reason for Disposition    [1] Contractions < 10 minutes apart AND [2] persist > 1 hour  (i.e., 6 or more contractions an hour)    Additional Information    Negative: Passed out (i.e., lost consciousness, collapsed and was not responding)    Negative: Shock suspected (e.g., cold/pale/clammy skin, too weak to stand, low BP, rapid pulse)    Negative: Difficult to awaken or acting confused (e.g., disoriented, slurred speech)    Negative: [1] SEVERE abdominal pain (e.g., excruciating) AND [2] constant AND [3] present > 1 hour    Negative: Severe bleeding  "(e.g., continuous red blood from vagina, or large blood clots)    Negative: Umbilical cord hanging out of the vagina (shiny, white, curled appearance, \"like telephone cord\")    Negative: Uncontrollable urge to push (i.e., feels like baby is coming out now)    Negative: Can see baby    Negative: Sounds like a life-threatening emergency to the triager    Negative: MODERATE-SEVERE abdominal pain    Protocols used: PREGNANCY - LABOR - [id number]      "

## 2022-10-24 ENCOUNTER — TELEPHONE (OUTPATIENT)
Dept: OBGYN | Facility: CLINIC | Age: 32
End: 2022-10-24

## 2022-10-24 ENCOUNTER — HOSPITAL ENCOUNTER (INPATIENT)
Facility: CLINIC | Age: 32
LOS: 4 days | Discharge: HOME HEALTH | DRG: 776 | End: 2022-10-28
Attending: OBSTETRICS & GYNECOLOGY | Admitting: OBSTETRICS & GYNECOLOGY
Payer: COMMERCIAL

## 2022-10-24 ENCOUNTER — ANESTHESIA EVENT (OUTPATIENT)
Dept: SURGERY | Facility: CLINIC | Age: 32
End: 2022-10-24
Payer: COMMERCIAL

## 2022-10-24 VITALS
HEART RATE: 77 BPM | OXYGEN SATURATION: 98 % | DIASTOLIC BLOOD PRESSURE: 64 MMHG | RESPIRATION RATE: 16 BRPM | SYSTOLIC BLOOD PRESSURE: 118 MMHG | TEMPERATURE: 98.3 F

## 2022-10-24 DIAGNOSIS — O24.414 INSULIN CONTROLLED GESTATIONAL DIABETES MELLITUS (GDM) IN THIRD TRIMESTER: ICD-10-CM

## 2022-10-24 DIAGNOSIS — Z98.891 HISTORY OF CESAREAN SECTION: Primary | ICD-10-CM

## 2022-10-24 PROBLEM — O60.00 PRETERM LABOR: Status: ACTIVE | Noted: 2022-10-24

## 2022-10-24 LAB
ALT SERPL W P-5'-P-CCNC: 43 U/L (ref 0–50)
AST SERPL W P-5'-P-CCNC: 42 U/L (ref 0–45)
CREAT SERPL-MCNC: 0.53 MG/DL (ref 0.52–1.04)
ERYTHROCYTE [DISTWIDTH] IN BLOOD BY AUTOMATED COUNT: 14.6 % (ref 10–15)
GFR SERPL CREATININE-BSD FRML MDRD: >90 ML/MIN/1.73M2
GLUCOSE BLDC GLUCOMTR-MCNC: 68 MG/DL (ref 70–99)
GLUCOSE BLDC GLUCOMTR-MCNC: 73 MG/DL (ref 70–99)
HCT VFR BLD AUTO: 35.1 % (ref 35–47)
HGB BLD-MCNC: 11 G/DL (ref 11.7–15.7)
MCH RBC QN AUTO: 27.6 PG (ref 26.5–33)
MCHC RBC AUTO-ENTMCNC: 31.3 G/DL (ref 31.5–36.5)
MCV RBC AUTO: 88 FL (ref 78–100)
PLATELET # BLD AUTO: 214 10E3/UL (ref 150–450)
RBC # BLD AUTO: 3.99 10E6/UL (ref 3.8–5.2)
T PALLIDUM AB SER QL: NONREACTIVE
WBC # BLD AUTO: 7.4 10E3/UL (ref 4–11)

## 2022-10-24 PROCEDURE — 250N000013 HC RX MED GY IP 250 OP 250 PS 637: Performed by: OBSTETRICS & GYNECOLOGY

## 2022-10-24 PROCEDURE — 710N000010 HC RECOVERY PHASE 1, LEVEL 2, PER MIN: Performed by: OBSTETRICS & GYNECOLOGY

## 2022-10-24 PROCEDURE — 84450 TRANSFERASE (AST) (SGOT): CPT | Performed by: STUDENT IN AN ORGANIZED HEALTH CARE EDUCATION/TRAINING PROGRAM

## 2022-10-24 PROCEDURE — 85027 COMPLETE CBC AUTOMATED: CPT | Performed by: STUDENT IN AN ORGANIZED HEALTH CARE EDUCATION/TRAINING PROGRAM

## 2022-10-24 PROCEDURE — 250N000011 HC RX IP 250 OP 636: Performed by: STUDENT IN AN ORGANIZED HEALTH CARE EDUCATION/TRAINING PROGRAM

## 2022-10-24 PROCEDURE — 250N000011 HC RX IP 250 OP 636: Performed by: NURSE ANESTHETIST, CERTIFIED REGISTERED

## 2022-10-24 PROCEDURE — 271N000001 HC OR GENERAL SUPPLY NON-STERILE: Performed by: OBSTETRICS & GYNECOLOGY

## 2022-10-24 PROCEDURE — 59514 CESAREAN DELIVERY ONLY: CPT | Mod: AS | Performed by: PHYSICIAN ASSISTANT

## 2022-10-24 PROCEDURE — 59510 CESAREAN DELIVERY: CPT | Performed by: OBSTETRICS & GYNECOLOGY

## 2022-10-24 PROCEDURE — 120N000001 HC R&B MED SURG/OB

## 2022-10-24 PROCEDURE — 250N000011 HC RX IP 250 OP 636: Performed by: OBSTETRICS & GYNECOLOGY

## 2022-10-24 PROCEDURE — 250N000009 HC RX 250: Performed by: OBSTETRICS & GYNECOLOGY

## 2022-10-24 PROCEDURE — 360N000076 HC SURGERY LEVEL 3, PER MIN: Performed by: OBSTETRICS & GYNECOLOGY

## 2022-10-24 PROCEDURE — 82565 ASSAY OF CREATININE: CPT | Performed by: STUDENT IN AN ORGANIZED HEALTH CARE EDUCATION/TRAINING PROGRAM

## 2022-10-24 PROCEDURE — 370N000017 HC ANESTHESIA TECHNICAL FEE, PER MIN: Performed by: OBSTETRICS & GYNECOLOGY

## 2022-10-24 PROCEDURE — 120N000002 HC R&B MED SURG/OB UMMC

## 2022-10-24 PROCEDURE — 82962 GLUCOSE BLOOD TEST: CPT

## 2022-10-24 PROCEDURE — 258N000003 HC RX IP 258 OP 636: Performed by: OBSTETRICS & GYNECOLOGY

## 2022-10-24 PROCEDURE — 88307 TISSUE EXAM BY PATHOLOGIST: CPT | Mod: TC | Performed by: OBSTETRICS & GYNECOLOGY

## 2022-10-24 PROCEDURE — 84460 ALANINE AMINO (ALT) (SGPT): CPT | Performed by: STUDENT IN AN ORGANIZED HEALTH CARE EDUCATION/TRAINING PROGRAM

## 2022-10-24 PROCEDURE — 272N000001 HC OR GENERAL SUPPLY STERILE: Performed by: OBSTETRICS & GYNECOLOGY

## 2022-10-24 PROCEDURE — 36415 COLL VENOUS BLD VENIPUNCTURE: CPT | Performed by: STUDENT IN AN ORGANIZED HEALTH CARE EDUCATION/TRAINING PROGRAM

## 2022-10-24 PROCEDURE — 258N000003 HC RX IP 258 OP 636: Performed by: NURSE ANESTHETIST, CERTIFIED REGISTERED

## 2022-10-24 RX ORDER — MODIFIED LANOLIN
OINTMENT (GRAM) TOPICAL
Status: DISCONTINUED | OUTPATIENT
Start: 2022-10-24 | End: 2022-10-28 | Stop reason: HOSPADM

## 2022-10-24 RX ORDER — DEXTROSE, SODIUM CHLORIDE, SODIUM LACTATE, POTASSIUM CHLORIDE, AND CALCIUM CHLORIDE 5; .6; .31; .03; .02 G/100ML; G/100ML; G/100ML; G/100ML; G/100ML
INJECTION, SOLUTION INTRAVENOUS CONTINUOUS
Status: DISCONTINUED | OUTPATIENT
Start: 2022-10-24 | End: 2022-10-28 | Stop reason: HOSPADM

## 2022-10-24 RX ORDER — PROCHLORPERAZINE MALEATE 10 MG
10 TABLET ORAL EVERY 6 HOURS PRN
Status: DISCONTINUED | OUTPATIENT
Start: 2022-10-24 | End: 2022-10-28 | Stop reason: HOSPADM

## 2022-10-24 RX ORDER — MISOPROSTOL 200 UG/1
400 TABLET ORAL
Status: DISCONTINUED | OUTPATIENT
Start: 2022-10-24 | End: 2022-10-24 | Stop reason: HOSPADM

## 2022-10-24 RX ORDER — SODIUM CHLORIDE, SODIUM LACTATE, POTASSIUM CHLORIDE, CALCIUM CHLORIDE 600; 310; 30; 20 MG/100ML; MG/100ML; MG/100ML; MG/100ML
INJECTION, SOLUTION INTRAVENOUS CONTINUOUS
Status: DISCONTINUED | OUTPATIENT
Start: 2022-10-24 | End: 2022-10-24 | Stop reason: HOSPADM

## 2022-10-24 RX ORDER — PROCHLORPERAZINE MALEATE 10 MG
10 TABLET ORAL EVERY 6 HOURS PRN
Status: DISCONTINUED | OUTPATIENT
Start: 2022-10-24 | End: 2022-10-24 | Stop reason: HOSPADM

## 2022-10-24 RX ORDER — KETOROLAC TROMETHAMINE 30 MG/ML
30 INJECTION, SOLUTION INTRAMUSCULAR; INTRAVENOUS EVERY 6 HOURS
Status: DISCONTINUED | OUTPATIENT
Start: 2022-10-24 | End: 2022-10-25 | Stop reason: SINTOL

## 2022-10-24 RX ORDER — OXYCODONE HYDROCHLORIDE 5 MG/1
5 TABLET ORAL EVERY 4 HOURS PRN
Status: DISCONTINUED | OUTPATIENT
Start: 2022-10-24 | End: 2022-10-28 | Stop reason: HOSPADM

## 2022-10-24 RX ORDER — OXYTOCIN/0.9 % SODIUM CHLORIDE 30/500 ML
340 PLASTIC BAG, INJECTION (ML) INTRAVENOUS CONTINUOUS PRN
Status: DISCONTINUED | OUTPATIENT
Start: 2022-10-24 | End: 2022-10-24 | Stop reason: HOSPADM

## 2022-10-24 RX ORDER — DEXTROSE MONOHYDRATE 25 G/50ML
25-50 INJECTION, SOLUTION INTRAVENOUS
Status: DISCONTINUED | OUTPATIENT
Start: 2022-10-24 | End: 2022-10-24 | Stop reason: HOSPADM

## 2022-10-24 RX ORDER — NALOXONE HYDROCHLORIDE 1 MG/ML
0.2 INJECTION INTRAMUSCULAR; INTRAVENOUS; SUBCUTANEOUS
Status: DISCONTINUED | OUTPATIENT
Start: 2022-10-24 | End: 2022-10-28 | Stop reason: HOSPADM

## 2022-10-24 RX ORDER — NALOXONE HYDROCHLORIDE 1 MG/ML
0.4 INJECTION INTRAMUSCULAR; INTRAVENOUS; SUBCUTANEOUS
Status: DISCONTINUED | OUTPATIENT
Start: 2022-10-24 | End: 2022-10-28 | Stop reason: HOSPADM

## 2022-10-24 RX ORDER — FENTANYL CITRATE 50 UG/ML
50 INJECTION, SOLUTION INTRAMUSCULAR; INTRAVENOUS EVERY 5 MIN PRN
Status: DISCONTINUED | OUTPATIENT
Start: 2022-10-24 | End: 2022-10-24 | Stop reason: HOSPADM

## 2022-10-24 RX ORDER — MORPHINE SULFATE 1 MG/ML
INJECTION, SOLUTION EPIDURAL; INTRATHECAL; INTRAVENOUS
Status: COMPLETED | OUTPATIENT
Start: 2022-10-24 | End: 2022-10-24

## 2022-10-24 RX ORDER — BISACODYL 10 MG
10 SUPPOSITORY, RECTAL RECTAL DAILY PRN
Status: DISCONTINUED | OUTPATIENT
Start: 2022-10-26 | End: 2022-10-24 | Stop reason: HOSPADM

## 2022-10-24 RX ORDER — PROCHLORPERAZINE 25 MG
25 SUPPOSITORY, RECTAL RECTAL EVERY 12 HOURS PRN
Status: DISCONTINUED | OUTPATIENT
Start: 2022-10-24 | End: 2022-10-24 | Stop reason: HOSPADM

## 2022-10-24 RX ORDER — SODIUM CHLORIDE, SODIUM LACTATE, POTASSIUM CHLORIDE, CALCIUM CHLORIDE 600; 310; 30; 20 MG/100ML; MG/100ML; MG/100ML; MG/100ML
INJECTION, SOLUTION INTRAVENOUS
Status: DISPENSED
Start: 2022-10-24 | End: 2022-10-25

## 2022-10-24 RX ORDER — SIMETHICONE 80 MG
80 TABLET,CHEWABLE ORAL 4 TIMES DAILY PRN
Status: DISCONTINUED | OUTPATIENT
Start: 2022-10-24 | End: 2022-10-24 | Stop reason: HOSPADM

## 2022-10-24 RX ORDER — NALOXONE HYDROCHLORIDE 0.4 MG/ML
0.4 INJECTION, SOLUTION INTRAMUSCULAR; INTRAVENOUS; SUBCUTANEOUS
Status: DISCONTINUED | OUTPATIENT
Start: 2022-10-24 | End: 2022-10-24 | Stop reason: HOSPADM

## 2022-10-24 RX ORDER — LIDOCAINE 40 MG/G
CREAM TOPICAL
Status: DISCONTINUED | OUTPATIENT
Start: 2022-10-24 | End: 2022-10-28 | Stop reason: HOSPADM

## 2022-10-24 RX ORDER — ONDANSETRON 2 MG/ML
4 INJECTION INTRAMUSCULAR; INTRAVENOUS EVERY 30 MIN PRN
Status: DISCONTINUED | OUTPATIENT
Start: 2022-10-24 | End: 2022-10-24 | Stop reason: HOSPADM

## 2022-10-24 RX ORDER — CITRIC ACID/SODIUM CITRATE 334-500MG
30 SOLUTION, ORAL ORAL
Status: COMPLETED | OUTPATIENT
Start: 2022-10-24 | End: 2022-10-24

## 2022-10-24 RX ORDER — METHYLERGONOVINE MALEATE 0.2 MG/ML
200 INJECTION INTRAVENOUS
Status: DISCONTINUED | OUTPATIENT
Start: 2022-10-24 | End: 2022-10-28 | Stop reason: HOSPADM

## 2022-10-24 RX ORDER — FENTANYL CITRATE 50 UG/ML
INJECTION, SOLUTION INTRAMUSCULAR; INTRAVENOUS
Status: COMPLETED | OUTPATIENT
Start: 2022-10-24 | End: 2022-10-24

## 2022-10-24 RX ORDER — HYDROCORTISONE 25 MG/G
CREAM TOPICAL 3 TIMES DAILY PRN
Status: DISCONTINUED | OUTPATIENT
Start: 2022-10-24 | End: 2022-10-28 | Stop reason: HOSPADM

## 2022-10-24 RX ORDER — NALOXONE HYDROCHLORIDE 0.4 MG/ML
0.2 INJECTION, SOLUTION INTRAMUSCULAR; INTRAVENOUS; SUBCUTANEOUS
Status: DISCONTINUED | OUTPATIENT
Start: 2022-10-24 | End: 2022-10-24 | Stop reason: HOSPADM

## 2022-10-24 RX ORDER — LIDOCAINE 40 MG/G
CREAM TOPICAL
Status: DISCONTINUED | OUTPATIENT
Start: 2022-10-24 | End: 2022-10-24 | Stop reason: HOSPADM

## 2022-10-24 RX ORDER — ONDANSETRON 2 MG/ML
4 INJECTION INTRAMUSCULAR; INTRAVENOUS EVERY 6 HOURS PRN
Status: DISCONTINUED | OUTPATIENT
Start: 2022-10-24 | End: 2022-10-24 | Stop reason: HOSPADM

## 2022-10-24 RX ORDER — TRANEXAMIC ACID 10 MG/ML
1 INJECTION, SOLUTION INTRAVENOUS EVERY 30 MIN PRN
Status: DISCONTINUED | OUTPATIENT
Start: 2022-10-24 | End: 2022-10-24 | Stop reason: HOSPADM

## 2022-10-24 RX ORDER — ONDANSETRON 4 MG/1
4 TABLET, ORALLY DISINTEGRATING ORAL EVERY 6 HOURS PRN
Status: DISCONTINUED | OUTPATIENT
Start: 2022-10-24 | End: 2022-10-24 | Stop reason: HOSPADM

## 2022-10-24 RX ORDER — CARBOPROST TROMETHAMINE 250 UG/ML
250 INJECTION, SOLUTION INTRAMUSCULAR
Status: DISCONTINUED | OUTPATIENT
Start: 2022-10-24 | End: 2022-10-28 | Stop reason: HOSPADM

## 2022-10-24 RX ORDER — MISOPROSTOL 200 UG/1
400 TABLET ORAL
Status: DISCONTINUED | OUTPATIENT
Start: 2022-10-24 | End: 2022-10-28 | Stop reason: HOSPADM

## 2022-10-24 RX ORDER — METOCLOPRAMIDE 10 MG/1
10 TABLET ORAL EVERY 6 HOURS PRN
Status: DISCONTINUED | OUTPATIENT
Start: 2022-10-24 | End: 2022-10-24 | Stop reason: HOSPADM

## 2022-10-24 RX ORDER — OXYTOCIN 10 [USP'U]/ML
10 INJECTION, SOLUTION INTRAMUSCULAR; INTRAVENOUS
Status: DISCONTINUED | OUTPATIENT
Start: 2022-10-24 | End: 2022-10-24 | Stop reason: HOSPADM

## 2022-10-24 RX ORDER — AMOXICILLIN 250 MG
2 CAPSULE ORAL 2 TIMES DAILY
Status: DISCONTINUED | OUTPATIENT
Start: 2022-10-24 | End: 2022-10-24 | Stop reason: HOSPADM

## 2022-10-24 RX ORDER — DEXTROSE, SODIUM CHLORIDE, SODIUM LACTATE, POTASSIUM CHLORIDE, AND CALCIUM CHLORIDE 5; .6; .31; .03; .02 G/100ML; G/100ML; G/100ML; G/100ML; G/100ML
INJECTION, SOLUTION INTRAVENOUS CONTINUOUS
Status: DISCONTINUED | OUTPATIENT
Start: 2022-10-24 | End: 2022-10-24 | Stop reason: HOSPADM

## 2022-10-24 RX ORDER — CARBOPROST TROMETHAMINE 250 UG/ML
250 INJECTION, SOLUTION INTRAMUSCULAR
Status: DISCONTINUED | OUTPATIENT
Start: 2022-10-24 | End: 2022-10-24 | Stop reason: HOSPADM

## 2022-10-24 RX ORDER — ACETAMINOPHEN 325 MG/1
975 TABLET ORAL EVERY 6 HOURS
Status: DISCONTINUED | OUTPATIENT
Start: 2022-10-24 | End: 2022-10-28 | Stop reason: HOSPADM

## 2022-10-24 RX ORDER — KETOROLAC TROMETHAMINE 30 MG/ML
INJECTION, SOLUTION INTRAMUSCULAR; INTRAVENOUS PRN
Status: DISCONTINUED | OUTPATIENT
Start: 2022-10-24 | End: 2022-10-24

## 2022-10-24 RX ORDER — IBUPROFEN 800 MG/1
800 TABLET, FILM COATED ORAL EVERY 6 HOURS
Status: DISCONTINUED | OUTPATIENT
Start: 2022-10-25 | End: 2022-10-28 | Stop reason: HOSPADM

## 2022-10-24 RX ORDER — ENOXAPARIN SODIUM 100 MG/ML
40 INJECTION SUBCUTANEOUS EVERY 24 HOURS
Status: DISCONTINUED | OUTPATIENT
Start: 2022-10-25 | End: 2022-10-28 | Stop reason: HOSPADM

## 2022-10-24 RX ORDER — METOCLOPRAMIDE 10 MG/1
10 TABLET ORAL EVERY 6 HOURS PRN
Status: DISCONTINUED | OUTPATIENT
Start: 2022-10-24 | End: 2022-10-28 | Stop reason: HOSPADM

## 2022-10-24 RX ORDER — OXYTOCIN/0.9 % SODIUM CHLORIDE 30/500 ML
340 PLASTIC BAG, INJECTION (ML) INTRAVENOUS CONTINUOUS PRN
Status: DISCONTINUED | OUTPATIENT
Start: 2022-10-24 | End: 2022-10-28 | Stop reason: HOSPADM

## 2022-10-24 RX ORDER — MODIFIED LANOLIN
OINTMENT (GRAM) TOPICAL
Status: DISCONTINUED | OUTPATIENT
Start: 2022-10-24 | End: 2022-10-24 | Stop reason: HOSPADM

## 2022-10-24 RX ORDER — BUPIVACAINE HYDROCHLORIDE 7.5 MG/ML
INJECTION, SOLUTION INTRASPINAL
Status: COMPLETED | OUTPATIENT
Start: 2022-10-24 | End: 2022-10-24

## 2022-10-24 RX ORDER — ACETAMINOPHEN 325 MG/1
975 TABLET ORAL ONCE
Status: COMPLETED | OUTPATIENT
Start: 2022-10-24 | End: 2022-10-24

## 2022-10-24 RX ORDER — OXYCODONE HYDROCHLORIDE 5 MG/1
5 TABLET ORAL EVERY 4 HOURS PRN
Status: DISCONTINUED | OUTPATIENT
Start: 2022-10-24 | End: 2022-10-24 | Stop reason: HOSPADM

## 2022-10-24 RX ORDER — METOCLOPRAMIDE HYDROCHLORIDE 5 MG/ML
10 INJECTION INTRAMUSCULAR; INTRAVENOUS EVERY 6 HOURS PRN
Status: DISCONTINUED | OUTPATIENT
Start: 2022-10-24 | End: 2022-10-24 | Stop reason: HOSPADM

## 2022-10-24 RX ORDER — TRANEXAMIC ACID 10 MG/ML
1 INJECTION, SOLUTION INTRAVENOUS EVERY 30 MIN PRN
Status: DISCONTINUED | OUTPATIENT
Start: 2022-10-24 | End: 2022-10-28 | Stop reason: HOSPADM

## 2022-10-24 RX ORDER — METHYLERGONOVINE MALEATE 0.2 MG/ML
200 INJECTION INTRAVENOUS
Status: DISCONTINUED | OUTPATIENT
Start: 2022-10-24 | End: 2022-10-24 | Stop reason: HOSPADM

## 2022-10-24 RX ORDER — NICOTINE POLACRILEX 4 MG
15-30 LOZENGE BUCCAL
Status: DISCONTINUED | OUTPATIENT
Start: 2022-10-24 | End: 2022-10-24 | Stop reason: HOSPADM

## 2022-10-24 RX ORDER — MISOPROSTOL 200 UG/1
800 TABLET ORAL
Status: DISCONTINUED | OUTPATIENT
Start: 2022-10-24 | End: 2022-10-24 | Stop reason: HOSPADM

## 2022-10-24 RX ORDER — ONDANSETRON 4 MG/1
4 TABLET, ORALLY DISINTEGRATING ORAL EVERY 6 HOURS PRN
Status: DISCONTINUED | OUTPATIENT
Start: 2022-10-24 | End: 2022-10-28 | Stop reason: HOSPADM

## 2022-10-24 RX ORDER — AMOXICILLIN 250 MG
2 CAPSULE ORAL 2 TIMES DAILY
Status: DISCONTINUED | OUTPATIENT
Start: 2022-10-24 | End: 2022-10-28 | Stop reason: HOSPADM

## 2022-10-24 RX ORDER — ENOXAPARIN SODIUM 100 MG/ML
40 INJECTION SUBCUTANEOUS EVERY 24 HOURS
Status: DISCONTINUED | OUTPATIENT
Start: 2022-10-24 | End: 2022-10-24 | Stop reason: HOSPADM

## 2022-10-24 RX ORDER — MAGNESIUM HYDROXIDE 1200 MG/15ML
LIQUID ORAL PRN
Status: DISCONTINUED | OUTPATIENT
Start: 2022-10-24 | End: 2022-10-24 | Stop reason: HOSPADM

## 2022-10-24 RX ORDER — SIMETHICONE 80 MG
80 TABLET,CHEWABLE ORAL 4 TIMES DAILY PRN
Status: DISCONTINUED | OUTPATIENT
Start: 2022-10-24 | End: 2022-10-28 | Stop reason: HOSPADM

## 2022-10-24 RX ORDER — AMOXICILLIN 250 MG
1 CAPSULE ORAL 2 TIMES DAILY
Status: DISCONTINUED | OUTPATIENT
Start: 2022-10-24 | End: 2022-10-28 | Stop reason: HOSPADM

## 2022-10-24 RX ORDER — CEFAZOLIN SODIUM/WATER 2 G/20 ML
2 SYRINGE (ML) INTRAVENOUS
Status: COMPLETED | OUTPATIENT
Start: 2022-10-24 | End: 2022-10-24

## 2022-10-24 RX ORDER — AMOXICILLIN 250 MG
1 CAPSULE ORAL 2 TIMES DAILY
Status: DISCONTINUED | OUTPATIENT
Start: 2022-10-24 | End: 2022-10-24 | Stop reason: HOSPADM

## 2022-10-24 RX ORDER — MISOPROSTOL 200 UG/1
800 TABLET ORAL
Status: DISCONTINUED | OUTPATIENT
Start: 2022-10-24 | End: 2022-10-28 | Stop reason: HOSPADM

## 2022-10-24 RX ORDER — HYDROMORPHONE HCL IN WATER/PF 6 MG/30 ML
0.4 PATIENT CONTROLLED ANALGESIA SYRINGE INTRAVENOUS EVERY 5 MIN PRN
Status: DISCONTINUED | OUTPATIENT
Start: 2022-10-24 | End: 2022-10-24 | Stop reason: HOSPADM

## 2022-10-24 RX ORDER — ACETAMINOPHEN 325 MG/1
975 TABLET ORAL EVERY 6 HOURS
Status: DISCONTINUED | OUTPATIENT
Start: 2022-10-24 | End: 2022-10-24 | Stop reason: HOSPADM

## 2022-10-24 RX ORDER — KETOROLAC TROMETHAMINE 30 MG/ML
30 INJECTION, SOLUTION INTRAMUSCULAR; INTRAVENOUS EVERY 6 HOURS
Status: DISCONTINUED | OUTPATIENT
Start: 2022-10-24 | End: 2022-10-24 | Stop reason: HOSPADM

## 2022-10-24 RX ORDER — OXYTOCIN/0.9 % SODIUM CHLORIDE 30/500 ML
340 PLASTIC BAG, INJECTION (ML) INTRAVENOUS CONTINUOUS PRN
Status: COMPLETED | OUTPATIENT
Start: 2022-10-24 | End: 2022-10-24

## 2022-10-24 RX ORDER — METOCLOPRAMIDE HYDROCHLORIDE 5 MG/ML
10 INJECTION INTRAMUSCULAR; INTRAVENOUS EVERY 6 HOURS PRN
Status: DISCONTINUED | OUTPATIENT
Start: 2022-10-24 | End: 2022-10-28 | Stop reason: HOSPADM

## 2022-10-24 RX ORDER — OXYTOCIN 10 [USP'U]/ML
10 INJECTION, SOLUTION INTRAMUSCULAR; INTRAVENOUS
Status: DISCONTINUED | OUTPATIENT
Start: 2022-10-24 | End: 2022-10-28 | Stop reason: HOSPADM

## 2022-10-24 RX ORDER — ONDANSETRON 4 MG/1
4 TABLET, ORALLY DISINTEGRATING ORAL EVERY 30 MIN PRN
Status: DISCONTINUED | OUTPATIENT
Start: 2022-10-24 | End: 2022-10-24 | Stop reason: HOSPADM

## 2022-10-24 RX ORDER — ONDANSETRON 2 MG/ML
4 INJECTION INTRAMUSCULAR; INTRAVENOUS EVERY 6 HOURS PRN
Status: DISCONTINUED | OUTPATIENT
Start: 2022-10-24 | End: 2022-10-28 | Stop reason: HOSPADM

## 2022-10-24 RX ORDER — HYDROCORTISONE 25 MG/G
CREAM TOPICAL 3 TIMES DAILY PRN
Status: DISCONTINUED | OUTPATIENT
Start: 2022-10-24 | End: 2022-10-24 | Stop reason: HOSPADM

## 2022-10-24 RX ORDER — CEFAZOLIN SODIUM/WATER 2 G/20 ML
2 SYRINGE (ML) INTRAVENOUS SEE ADMIN INSTRUCTIONS
Status: DISCONTINUED | OUTPATIENT
Start: 2022-10-24 | End: 2022-10-24 | Stop reason: HOSPADM

## 2022-10-24 RX ORDER — PROCHLORPERAZINE 25 MG
25 SUPPOSITORY, RECTAL RECTAL EVERY 12 HOURS PRN
Status: DISCONTINUED | OUTPATIENT
Start: 2022-10-24 | End: 2022-10-28 | Stop reason: HOSPADM

## 2022-10-24 RX ORDER — IBUPROFEN 800 MG/1
800 TABLET, FILM COATED ORAL EVERY 6 HOURS
Status: DISCONTINUED | OUTPATIENT
Start: 2022-10-25 | End: 2022-10-24 | Stop reason: HOSPADM

## 2022-10-24 RX ORDER — OXYTOCIN/0.9 % SODIUM CHLORIDE 30/500 ML
100-340 PLASTIC BAG, INJECTION (ML) INTRAVENOUS CONTINUOUS PRN
Status: DISCONTINUED | OUTPATIENT
Start: 2022-10-24 | End: 2022-10-24 | Stop reason: HOSPADM

## 2022-10-24 RX ORDER — BISACODYL 10 MG
10 SUPPOSITORY, RECTAL RECTAL DAILY PRN
Status: DISCONTINUED | OUTPATIENT
Start: 2022-10-26 | End: 2022-10-28 | Stop reason: HOSPADM

## 2022-10-24 RX ADMIN — Medication 100 ML/HR: at 12:30

## 2022-10-24 RX ADMIN — SODIUM CITRATE AND CITRIC ACID MONOHYDRATE 30 ML: 500; 334 SOLUTION ORAL at 09:55

## 2022-10-24 RX ADMIN — ONDANSETRON 4 MG: 2 INJECTION INTRAMUSCULAR; INTRAVENOUS at 10:46

## 2022-10-24 RX ADMIN — ONDANSETRON 4 MG: 2 INJECTION INTRAMUSCULAR; INTRAVENOUS at 12:01

## 2022-10-24 RX ADMIN — Medication 2 G: at 10:10

## 2022-10-24 RX ADMIN — Medication 300 ML/HR: at 10:41

## 2022-10-24 RX ADMIN — SODIUM CHLORIDE, SODIUM LACTATE, POTASSIUM CHLORIDE, CALCIUM CHLORIDE AND DEXTROSE MONOHYDRATE: 5; 600; 310; 30; 20 INJECTION, SOLUTION INTRAVENOUS at 17:33

## 2022-10-24 RX ADMIN — SODIUM CHLORIDE, POTASSIUM CHLORIDE, SODIUM LACTATE AND CALCIUM CHLORIDE 500 ML: 600; 310; 30; 20 INJECTION, SOLUTION INTRAVENOUS at 18:35

## 2022-10-24 RX ADMIN — FENTANYL CITRATE 15 MCG: 50 INJECTION, SOLUTION INTRAMUSCULAR; INTRAVENOUS at 10:15

## 2022-10-24 RX ADMIN — SODIUM CHLORIDE, POTASSIUM CHLORIDE, SODIUM LACTATE AND CALCIUM CHLORIDE: 600; 310; 30; 20 INJECTION, SOLUTION INTRAVENOUS at 11:14

## 2022-10-24 RX ADMIN — ACETAMINOPHEN 975 MG: 325 TABLET, FILM COATED ORAL at 09:55

## 2022-10-24 RX ADMIN — PHENYLEPHRINE HYDROCHLORIDE 0.2 MCG/KG/MIN: 10 INJECTION INTRAVENOUS at 10:14

## 2022-10-24 RX ADMIN — BUPIVACAINE HYDROCHLORIDE IN DEXTROSE 1.8 ML: 7.5 INJECTION, SOLUTION SUBARACHNOID at 10:15

## 2022-10-24 RX ADMIN — PROCHLORPERAZINE EDISYLATE 10 MG: 5 INJECTION INTRAMUSCULAR; INTRAVENOUS at 12:25

## 2022-10-24 RX ADMIN — MORPHINE SULFATE 0.2 MG: 1 INJECTION, SOLUTION EPIDURAL; INTRATHECAL; INTRAVENOUS at 10:15

## 2022-10-24 RX ADMIN — Medication 2 G: at 09:55

## 2022-10-24 RX ADMIN — KETOROLAC TROMETHAMINE 30 MG: 30 INJECTION, SOLUTION INTRAMUSCULAR at 11:14

## 2022-10-24 RX ADMIN — ONDANSETRON 4 MG: 2 INJECTION INTRAMUSCULAR; INTRAVENOUS at 17:41

## 2022-10-24 ASSESSMENT — ACTIVITIES OF DAILY LIVING (ADL)
ADLS_ACUITY_SCORE: 20
ADLS_ACUITY_SCORE: 35
ADLS_ACUITY_SCORE: 20
ADLS_ACUITY_SCORE: 35
ADLS_ACUITY_SCORE: 20
ADLS_ACUITY_SCORE: 35
ADLS_ACUITY_SCORE: 20
ADLS_ACUITY_SCORE: 35

## 2022-10-24 NOTE — PLAN OF CARE
S:Delivery  B  Labor,35w6d    No results found for: GBS positive, membranes intact prior to c/s. .  A: Patient delivered Repeat C/S at 1039 with Dr. JODY Woodard in attendance and baby placed on mother's abdomen for delayed cord clamping. Baby received from surgeon. Baby to warmer for assessment/resusitative efforts. Infant transferred to [facility]. Apgars 9/9/8.Delivery .  IV infusion of Oxytocin  infused. Placenta removal manual. MD does want placenta sent to pathology.  See Flowsheet for VS and PP checks.  .  Labor care plan goals met, transition now to postpartum care.   R: Expect routine postpartum care. Anticipate first feeding within the hour or whenever infant displays feeding cues. Continue skin to skin. Prior discussion with mother indicates that feeding plan is Breast feeding . Educated mother on importance of exclusive breastfeeding, expected feeding readiness cues and encouraged her to observe for these cues while rooming in. Informed her that breastfeeding assistance would be provided.

## 2022-10-24 NOTE — OR NURSING
Data:   Reason for Transport: PACU phase of care completed.    Brandee Nesbitt was transported to the OB care unit via bed at 1230.  Patient was accompanied by Family. Equipment used for transport: None. Family was aware of reason for transport: yes    Action:  Report: given to OB RN    Response:   Patient's condition stable: Marci 10, VSS, continues to have some PONV. See MAR and flowsheets for details.     Little Chicas RN

## 2022-10-24 NOTE — PROGRESS NOTES
[facility]  Labor Progress Note    Subjective:  Patient states did not sleep overnight other than one hour. Continues to feel uncomfortable with contractions. No LOF. +FM.     Objective:   Patient Vitals for the past 4 hrs:   BP Temp Temp src Resp   10/24/22 0810 112/64 97.8  F (36.6  C) Oral 18     SVE: 360/-2    FHT: Baseline 130s mod variability + accels no decels  [place]: Difficult to  on toco- every few min per pt report      Assessment/Plan:  Ms. Brandee Nesbitt is a 32 year old  at 35w6d here with concern for PTL. On SVE this AM, pt exam has changed to 3 cm with thinning of cervix. She continues to report uncomfortable contractions and did not sleep overnight despite hydration and sleep meds. Recommended proceeding with RLTCS. [place]ie[place] risks, benefits, alternatives, pt agreed to proceed. Informed consent signed.       GDMA2, on NPH 26 units at bedtime and 4 units before breakfast  -NPO ovenright other than small amount of clears  -NPH 13 units at bedtime and before breakfast held. Fasting glucose this AM WNL     Intrahepatic cholestasis of pregnancy  -ursodiol 300 mg twice daily, will discontinue after delivery  -Patient's peak total bile acid was >93.4 on 10/3. This leave us room to interpretation because the bile acid could have easily been >100. This would make delivery at 36w0d plausible.      Class II obesity  -Pharmacologic DVT prophylaxis is needed if .     Hx DCIS s/p right mastectomy  -Lactation consultation after delivery if patient desires breast-feeding.     clinically insignif RBC antibody  -Type and cross x2 units for RBCs, on hand by report     COVID positive on 2022  -No retesting for COVID upon admission.    Lily Woodard MD   10/24/2022 9:31 AM

## 2022-10-24 NOTE — ANESTHESIA PREPROCEDURE EVALUATION
Anesthesia Pre-Procedure Evaluation    Patient: Brandee Nesbitt   MRN: [id number] : 1990        Procedure : Procedure(s):   SECTION          Past Medical History:   Diagnosis Date     Anemia      Anti-mi-2 antibody present     anti-M (cold) red blood cell antibody     Breast cancer (H)      Gastroesophageal reflux disease      Mild intermittent asthma without complication      Obesity       Past Surgical History:   Procedure Laterality Date     AS REDUCTION OF LARGE BREAST  2021      SECTION       CHOLECYSTECTOMY       right mastectomy  2021      Allergies   Allergen Reactions     Diagnostic X-Ray Materials Shortness Of Breath, Other (See Comments) and Dizziness     Contrast dye-Shortness of breath  Pt became sleepy and confused. Needed to remind the pt to keep breathing, but denied any itching, swelling, or breathing difficulties.     Naproxen Shortness Of Breath     Phentermine Other (See Comments)     Palpitations, chest pain     Vancomycin Itching, Other (See Comments) and Rash     After few minutes, redness and itching noted in forearm. Symptoms diminished in few minutes after rate decreased by 1/2.    Patient has history of vancomycin infusion reaction. For further doses, vancomycin should be infused at a rate no higher than 10 mg/min or each gram over 100 minutes.  May also consider administering diphenhydramine and famotidine one hour before infusion.  Rash all over body itchy       Kiwi       Social History     Tobacco Use     Smoking status: Never     Smokeless tobacco: Never   Substance Use Topics     Alcohol use: Not Currently     Comment: very occasional-quit with pregnancy      Wt Readings from Last 1 Encounters:   10/18/22 115 kg (253 lb 9.6 oz)        Anesthesia Evaluation            ROS/MED HX  ENT/Pulmonary:     (+) Intermittent, asthma     Neurologic:     (+) migraines,     Cardiovascular:       METS/Exercise Tolerance:     Hematologic:     (+) anemia,      Musculoskeletal:       GI/Hepatic:     (+) GERD,     Renal/Genitourinary:       Endo: Comment: Gestational diabetes    (+) Obesity,     Psychiatric/Substance Use:       Infectious Disease:       Malignancy:   (+) Malignancy, History of Breast.Breast CA status post Surgery.        Other:            Physical Exam    Airway  airway exam normal      Mallampati: II   TM distance: > 3 FB   Neck ROM: full   Mouth opening: > 3 cm    Respiratory Devices and Support         Dental  no notable dental history         Cardiovascular   cardiovascular exam normal          Pulmonary   pulmonary exam normal                OUTSIDE LABS:  CBC:   Lab Results   Component Value Date    WBC 5.5 10/23/2022    WBC 5.8 09/27/2022    HGB 10.5 (L) 10/23/2022    HGB 10.6 (L) 09/27/2022    HCT 33.4 (L) 10/23/2022    HCT 33.5 (L) 09/27/2022     10/23/2022     09/27/2022     BMP:   Lab Results   Component Value Date     (L) 10/23/2022     09/27/2022    POTASSIUM 4.0 10/23/2022    POTASSIUM 3.7 09/27/2022    CHLORIDE 103 10/23/2022    CHLORIDE 103 09/27/2022    CO2 20 (L) 10/23/2022    CO2 23 09/27/2022    BUN 5.2 (L) 10/23/2022    BUN 6.5 09/27/2022    CR 0.68 10/23/2022    CR 0.61 09/27/2022    GLC 73 10/24/2022     (H) 10/23/2022     COAGS: No results found for: PTT, INR, FIBR  POC:   Lab Results   Component Value Date    HCG Negative 03/06/2022     HEPATIC:   Lab Results   Component Value Date    ALBUMIN 3.1 (L) 10/23/2022    PROTTOTAL 6.8 10/23/2022    ALT 29 10/23/2022    AST 30 10/23/2022    ALKPHOS 183 (H) 10/23/2022    BILITOTAL 0.3 10/23/2022     OTHER:   Lab Results   Component Value Date    NAIDA 9.1 10/23/2022    MAG 2.0 04/08/2022    LIPASE 148 06/10/2022       Anesthesia Plan    ASA Status:  3   NPO Status:  NPO Appropriate    Anesthesia Type: Spinal.      Maintenance: TIVA.        Consents    Anesthesia Plan(s) and associated risks, benefits, and realistic alternatives discussed. Questions answered  and patient/representative(s) expressed understanding.    - Discussed:     - Discussed with:  Patient         Postoperative Care    Pain management: Peripheral nerve block (Single Shot), Multi-modal analgesia.   PONV prophylaxis: Ondansetron (or other 5HT-3), Dexamethasone or Solumedrol     Comments:                MALINI Vences CRNA

## 2022-10-24 NOTE — PROVIDER NOTIFICATION
"   10/24/22 1528   Provider Notification   Provider Name/Title Dr. Chris and [facility] (Formerly named [facility])   Method of Notification Electronic Page   Request Evaluate-Remote   Notification Reason Other     \"Pt from [facility] has arrived; requesting to eat - can we put in nutrition order?\"  "

## 2022-10-24 NOTE — ANESTHESIA PROCEDURE NOTES
"Intrathecal injection Procedure Note    Pre-Procedure   Staff -        CRNA: Shannon Tillman APRN CRNA       Performed By: CRNA       Pre-Anesthestic Checklist: patient identified, IV checked, risks and benefits discussed, informed consent, monitors and equipment checked, pre-op evaluation, at physician/surgeon's request and post-op pain management  Timeout:       Correct Patient: Yes        Correct Procedure: Yes        Correct Site: Yes        Correct Position: Yes   Procedure Documentation  Procedure: intrathecal injection       Diagnosis: c section       Patient Position: sitting       Skin prep: Betadine       Insertion Site: L2-3. (midline approach).       Needle Gauge: 25.        Needle Length (Inches): 3.5        Spinal Needle Type: Pencan       Introducer used       # of attempts: 1 and  # of redirects:  2    Assessment/Narrative         Paresthesias: Resolved.       Sensory Level: T4       CSF fluid: clear.    Medication(s) Administered   0.75% Hyperbaric Bupivacaine (Intrathecal) - Intrathecal   1.8 mL - 10/24/2022 10:15:00 AM  Fentanyl PF (Intrathecal) - Intrathecal   15 mcg - 10/24/2022 10:15:00 AM  Morphine PF 1 mg/mL (Intrathecal) - Intrathecal   0.2 mg - 10/24/2022 10:15:00 AM    FOR [facility] ([facility]) ONLY:   Pain Team Contact information: please page the Pain Team Via Virtual Command. Search \"Pain\". During daytime hours, please page the attending first. At night please page the resident first.    "

## 2022-10-24 NOTE — ANESTHESIA CARE TRANSFER NOTE
Patient: Brandee Nesbitt    Procedure: Procedure(s):   SECTION       Diagnosis: Pregnancy with history of  section, antepartum [O34.219]  Diagnosis Additional Information: No value filed.    Anesthesia Type:   Spinal     Note:    Oropharynx: spontaneously breathing  Level of Consciousness: drowsy  Oxygen Supplementation: room air    Independent Airway: airway patency satisfactory and stable  Dentition: dentition unchanged  Vital Signs Stable: post-procedure vital signs reviewed and stable  Report to RN Given: handoff report given  Patient transferred to: Phase II    Handoff Report: Identifed the Patient, Identified the Reponsible Provider, Reviewed the pertinent medical history, Discussed the surgical course, Reviewed Intra-OP anesthesia mangement and issues during anesthesia, Set expectations for post-procedure period and Allowed opportunity for questions and acknowledgement of understanding      Vitals:  Vitals Value Taken Time   /78 10/24/22 1200   Temp 37.1  C (98.7  F) 10/24/22 1200   Pulse 100 10/24/22 1200   Resp 18 10/24/22 1200   SpO2 99 % 10/24/22 1206   Vitals shown include unvalidated device data.    Electronically Signed By: MALINI Vences CRNA  2022  12:17 PM

## 2022-10-24 NOTE — TELEPHONE ENCOUNTER
Paperwork completed and given to Dr. Schmidt to sign and approve.        Sharita Henderson   Clinic Station    [facility]  [phone]

## 2022-10-24 NOTE — ANESTHESIA POSTPROCEDURE EVALUATION
Patient: Brandee Nesbitt    Procedure: Procedure(s):   SECTION       Anesthesia Type:  Spinal    Note:  Disposition: Outpatient   Postop Pain Control: Uneventful            Sign Out: Well controlled pain   PONV: No   Neuro/Psych: Uneventful            Sign Out: Acceptable/Baseline neuro status   Airway/Respiratory: Uneventful            Sign Out: Acceptable/Baseline resp. status   CV/Hemodynamics: Uneventful            Sign Out: Acceptable CV status; No obvious hypovolemia; No obvious fluid overload   Other NRE: NONE   DID A NON-ROUTINE EVENT OCCUR? No           Last vitals:  Vitals Value Taken Time   /78 10/24/22 1200   Temp 37.1  C (98.7  F) 10/24/22 1200   Pulse 100 10/24/22 1200   Resp 18 10/24/22 1200   SpO2 99 % 10/24/22 1206   Vitals shown include unvalidated device data.    Electronically Signed By: MALINI Vences CRNA  2022  12:17 PM

## 2022-10-24 NOTE — OP NOTE
[facility]  Full Operative Progress Note     Surgery Date:  10/24/2022  Surgeon:  Lily Woodard MD   Assistants:  Luis Antonio LANDIN    This assistance of this surgeon was required due to the need for additional skilled surgical hands to retract and hold instruments due to the nature of the surgical procedure and risk of complications.     Specific reasons for assist: BMI and hx of CS    Pre-op Diagnosis:  1. Intrauterine pregnancy at 35w6d     2.  labor     3. Hx of CS     4. GDMA2     5. LGA     6. Cholestasis of pregnancy    Post-op Diagnosis:  1. Same      2. Liveborn male  infant   Procedure:  Repeat low-transverse  section with double layer uterine closure via Pfannenstiel incision    Anesthesia: Spinal  EBL:  717 mL  IVF:  See anesthesia documentation'  UOP:  See anesthesia documentation'    Drains: Johnston Catheter     Specimens:  Cord blood/gases, placenta    Complications: None     Indications:   Brandee Nesbitt is a 32 year old  at 35w6d admitted for possible PTL. She was admitted overnight for monitoring with IVF and sleep medications. Despite these she continued to report painful contractions and had cervical change and thus decision made to proceed with repeat CS. The risks, benefits, and alternatives of  section were discussed with the patient, and she agreed to proceed.     Findings:   1. Moderate rectofascial adhesions. No intraabdominal adhesions.  2. Clear amniotic fluid  3. Liveborn male infant in OP presentation. Apgars 9 at 1 minute & 8 at 5 minutes.   4. Normal uterus, fallopian tubes, and ovaries.     Procedure Details:   The patient was brought to the OR, where adequate Spinal anesthesia was administered.  She was placed in the dorsal supine position with a slight leftward tilt. She was prepped and draped in the usual sterile fashion. A surgical time out was performed. A pfannenstiel skin incision was made with the scalpel, and  carried down to the underlying fascia with sharp and blunt dissection. The fascia was incised in the midline, and the incision was extended laterally with the Daniels scissors. The superior aspect of the fascia was grasped with the Kocher clamps and dissected off of the underlying rectus muscles with blunt and sharp dissection. Attention was then turned to the inferior aspect of the fascia, which was similarly dissected off of the underlying rectus muscles. The rectus muscles were  in the midline, and the peritoneum was entered bluntly, and the opening was extended with digital pressure. José Luis O placed.     The vesicouterine peritoneum was incised in the midline, and the incision was extended laterally with the Metzenbaum scissors. A bladder flap was created digitally and the bladder blade was replaced.     A transverse hysterotomy was made with the scalpel in the lower uterine segment, and the incision was extended with digital pressure. The infant was noted to be in the OP position, and was delivered atraumatically. The shoulders delivered easily. Single nuchal cord was noted. The cord was doubly clamped and cut after one min and the infant was handed off to the awaiting RN  A segment of cord was cut. The placenta was delivered with gentle traction on the umbilical cord and uterine massage. The uterus was exteriorized and cleared of all clots and debris. Uterine tone was noted to be adequate with pitocin given through the running IV and uterine massage.  The hysterotomy was closed with a running locked suture of 0 Vicryl.  The hysterotomy was then imbricated using an 0 Monocryl suture. The hysterotomy was noted to be hemostatic. The posterior cul-de-sac was cleared of all clots and debris. The uterus was returned to the abdomen. The pericolic gutters were cleared of all clots and debris. The hysterotomy was reexamined and noted to be hemostatic.      The fascia and rectus muscles were examined and areas of  oozing were controlled with electrocautery. The fascia was closed with a running 0 Vicryl suture. The subcutaneous tissue was irrigated and areas of oozing were controlled with electrocautery. The subcutaneous tissue was greater than 2 cm in thickness, and was therefore closed with running layer of 3-0 plain gut .The skin was closed with 4-0 monocryl and skin glue    All sponge, needle, and instrument counts were correct. The patient tolerated the procedure well, and was transferred to recovery in stable condition.    Lily Woodard MD   10/24/2022 1:19 PM

## 2022-10-24 NOTE — INTERVAL H&P NOTE
I have reviewed the surgical (or preoperative) H&P that is linked to this encounter, and examined the patient. There are no significant changes    Clinical Conditions Present on Arrival:  Clinically Significant Risk Factors Present on Admission           # Hyponatremia: Lowest Na = 134 mmol/L (Ref range: 136-145) in last 2 days, will monitor as appropriate      # Hypoalbuminemia: Lowest albumin = 3.1 g/dL (Ref range: 3.5-5.2) at 10/23/2022  5:02 PM, will monitor as appropriate

## 2022-10-24 NOTE — PROGRESS NOTES
"Prn morphine and vistaril administered per pt request. Pt verbalized she was tired but not able to sleep d/t cxt pain. Pt initially rated pain 5-7/10, later in the evening rated 7-10/10, pt was able to hold a discussion through cxt. Pt now rates cxt 5-7/10 and states they are now \"bearable\". Pt states she was able to sleep x1hr throughout the night. Pt appears comfortable in bed. Cxt have spaced but pt states they are more frequent than monitor reveals.   "

## 2022-10-24 NOTE — DISCHARGE SUMMARY
[facility]  Delivery Discharge Summary    Admit date: 10/23/2022  Discharge date: 10/24/2022     Admit Dx:   - 32 year old y/o  at 35w6d   - History of  section   -  contractions  - GDMA2  - hx of DCIS  - obesity  - LGA  - cholestasis of pregnancy    Discharge Dx:  - Same as above, s/p primary low transverse  section  -  labor    Procedures:  - Repeat low transverse  section with double layer closure via Pfannenstiel incision  - Spinal analgesia    Admit HPI:  Ms. Brandee Nesbitt is a 32 year old  who presented with  contractions.    Hospital course:  She was observed overnight with continued discomfort despite IVF and sleep medications and had cervical change on exam. After discussion of risk and and benefits and signing informed consent the patient was taken to the operating room for repeat  section.    Indications for : repeat in PTL     EBL from the delivery was 717 ml  Please see her  Section Operative Note for full details regarding her delivery.    Her postoperative course was uncomplicated On POD#0 her infant was found to need transfer to higher level of care. She was recovering appropriately for POD#0. Transfer discussed with Dr. Chris at [facility] who was accepting.     Physical exam on the day of discharge:  Vitals:    10/24/22 1230 10/24/22 1245 10/24/22 1300 10/24/22 1315   BP: 125/73 117/63 126/71 116/63   Cuff Size:       Pulse: 95 94 96 80   Resp:       Temp:       TempSrc:       SpO2: 97% 97% 98% 98%       General: sitting up, alert and cooperative  Abd: soft, non-distended, non-tender. Fundus firm, nontender, below umbilicus.   Incision clean/dry/intact with dermabond in place.  Extremities: calves nontender, trace edema of lower extremities bilaterally    Discharge/Disposition:  Brandee Nesbitt was  transferred to [facility] in stable condition    Lily MORAN  MD Yamilet   [facility]  10/24/2022 1:31 PM

## 2022-10-25 LAB
GLUCOSE BLDC GLUCOMTR-MCNC: 108 MG/DL (ref 70–99)
HGB BLD-MCNC: 9.9 G/DL (ref 11.7–15.7)

## 2022-10-25 PROCEDURE — 36415 COLL VENOUS BLD VENIPUNCTURE: CPT | Performed by: STUDENT IN AN ORGANIZED HEALTH CARE EDUCATION/TRAINING PROGRAM

## 2022-10-25 PROCEDURE — 120N000002 HC R&B MED SURG/OB UMMC

## 2022-10-25 PROCEDURE — 250N000013 HC RX MED GY IP 250 OP 250 PS 637: Performed by: STUDENT IN AN ORGANIZED HEALTH CARE EDUCATION/TRAINING PROGRAM

## 2022-10-25 PROCEDURE — 250N000011 HC RX IP 250 OP 636: Performed by: STUDENT IN AN ORGANIZED HEALTH CARE EDUCATION/TRAINING PROGRAM

## 2022-10-25 PROCEDURE — 85018 HEMOGLOBIN: CPT | Performed by: STUDENT IN AN ORGANIZED HEALTH CARE EDUCATION/TRAINING PROGRAM

## 2022-10-25 RX ORDER — DIPHENHYDRAMINE HCL 25 MG
25 CAPSULE ORAL EVERY 6 HOURS PRN
Status: DISCONTINUED | OUTPATIENT
Start: 2022-10-25 | End: 2022-10-28 | Stop reason: HOSPADM

## 2022-10-25 RX ORDER — DIPHENHYDRAMINE HYDROCHLORIDE 50 MG/ML
25 INJECTION INTRAMUSCULAR; INTRAVENOUS EVERY 6 HOURS PRN
Status: DISCONTINUED | OUTPATIENT
Start: 2022-10-25 | End: 2022-10-28 | Stop reason: HOSPADM

## 2022-10-25 RX ADMIN — SENNOSIDES AND DOCUSATE SODIUM 2 TABLET: 50; 8.6 TABLET ORAL at 00:05

## 2022-10-25 RX ADMIN — ENOXAPARIN SODIUM 40 MG: 40 INJECTION SUBCUTANEOUS at 08:12

## 2022-10-25 RX ADMIN — ACETAMINOPHEN 975 MG: 325 TABLET, FILM COATED ORAL at 17:53

## 2022-10-25 RX ADMIN — SIMETHICONE 80 MG: 80 TABLET, CHEWABLE ORAL at 11:38

## 2022-10-25 RX ADMIN — ACETAMINOPHEN 975 MG: 325 TABLET, FILM COATED ORAL at 10:27

## 2022-10-25 RX ADMIN — IBUPROFEN 800 MG: 800 TABLET, FILM COATED ORAL at 15:00

## 2022-10-25 RX ADMIN — SENNOSIDES AND DOCUSATE SODIUM 2 TABLET: 50; 8.6 TABLET ORAL at 21:17

## 2022-10-25 RX ADMIN — SIMETHICONE 80 MG: 80 TABLET, CHEWABLE ORAL at 17:53

## 2022-10-25 RX ADMIN — SENNOSIDES AND DOCUSATE SODIUM 2 TABLET: 50; 8.6 TABLET ORAL at 08:13

## 2022-10-25 RX ADMIN — ACETAMINOPHEN 975 MG: 325 TABLET, FILM COATED ORAL at 04:07

## 2022-10-25 RX ADMIN — IBUPROFEN 800 MG: 800 TABLET, FILM COATED ORAL at 21:17

## 2022-10-25 ASSESSMENT — ACTIVITIES OF DAILY LIVING (ADL)
ADLS_ACUITY_SCORE: 35
TOILETING_ISSUES: NO
WALKING_OR_CLIMBING_STAIRS_DIFFICULTY: NO
VISION_MANAGEMENT: GLASSES
DIFFICULTY_EATING/SWALLOWING: NO
ADLS_ACUITY_SCORE: 20
ADLS_ACUITY_SCORE: 20
CHANGE_IN_FUNCTIONAL_STATUS_SINCE_ONSET_OF_CURRENT_ILLNESS/INJURY: NO
ADLS_ACUITY_SCORE: 35
ADLS_ACUITY_SCORE: 20
ADLS_ACUITY_SCORE: 20
ADLS_ACUITY_SCORE: 35
DOING_ERRANDS_INDEPENDENTLY_DIFFICULTY: NO
ADLS_ACUITY_SCORE: 20
FALL_HISTORY_WITHIN_LAST_SIX_MONTHS: NO
DRESSING/BATHING_DIFFICULTY: NO
CONCENTRATING,_REMEMBERING_OR_MAKING_DECISIONS_DIFFICULTY: NO
ADLS_ACUITY_SCORE: 20
ADLS_ACUITY_SCORE: 20
WEAR_GLASSES_OR_BLIND: YES

## 2022-10-25 NOTE — PROGRESS NOTES
Post Partum Progress Note  PPD#1    Subjective:  She is resting comfortably in bed this morning. She has no complaints. Has minimal pain. She had n/v throughout yesterday and feels it was related to toradol. Since avoiding that she has no nausea. She has tolerated popsicles so far. Lochia present and similar to menses. Johnston in place. She has not yet passed flatus. She is ambulating without dizziness or difficulty.  She denies headache, changes in vision, nausea/vomiting, chest pain, shortness of breath, RUQ pain, or worsening edema.  Plans to breastfeed.    Objective:  Vitals:    10/24/22 1524 10/24/22 1549 10/25/22 0010 10/25/22 0355   BP: (!) 137/91 (!) 141/96 133/81 130/80   BP Location:   Left arm Left arm   Patient Position:   Semi-Marion's Semi-Marion's   Cuff Size:   Adult Regular Adult Regular   Pulse: 81 91 81 97   Resp:  18 20 18   Temp: 98.3  F (36.8  C)  98.6  F (37  C) 99.8  F (37.7  C)   TempSrc: Oral  Oral Oral   SpO2:  94% 98% 96%       General: NAD. A&Ox3.  CV: Regular rate, well perfused.   Pulm: Normal respiratory effort.  Abd: Soft, non-tender, non-distended. Fundus is firm and 1 cm below the umbilicus.    Incision: covered in glue, clean, dry, intact  Ext: no lower extremity edema bilaterally. No calf tenderness.    Assessment/Plan:  Brandee Nesbitt is a 32 year old  female who is POD#1 s/p RLTCS at 35 weeks after presenting in  labor. Doing well in the early postop period.    Routine PP care  - Encourage routine post-operative goals including ambulation and incentive spirometry  - PNC: Rh pos. Rubella immune. No intervention indicated.  - Pain: controlled on oral medications  - Heme: Hgb 10.5> > 11> AM Hgb pending. Asymptomatic  If <10 will discharge home with iron.  - GI: continue anti-emetics and stool softeners as needed.  - : Johnston in place.  - Infant: Stable in NICU  - Feeding: Plans on breastfeeding.  - BC: need to discuss  - PPX: lovenox while inpatient  (BMI >35, c/s)    GDMA2  -  (not fasting, will repeat tomorrow)  - Plan for 2h GTT at pp visit    GHTN   - diagnosed postpartum for MR BP as well as single MR BP at prenatal visit  - HELLP labs wnl (10/24)  - Asymptomatic  - Plan discharge with BP cuff and close interval follow up    Asthma, stable  Hx DCIS, s/p R mastectomy    Discharge to home on POD#2-3    Hortencia Ptits MD  ObGyn Resident, PGY-3  6:58 AM 10/25/2022     Physician Attestation   I personally examined and evaluated this patient.  I discussed the patient with the resident/fellow and care team, and agree with the assessment and plan of care as documented in the note of 10/25/22.      I personally reviewed vital signs, medications, labs and exam.    Key findings: Doing well. Pain control ok, declines meds other than tylenol. Working on pumping - not getting much yet. Will have lactation consult in NICU.   Anticipate discharge POD#3.  Alyssa Isabel MD  Date of Service (when I saw the patient) 10/25/22

## 2022-10-25 NOTE — LACTATION NOTE
"This note was copied from a baby's chart.  D:  I met with Joesph, kAil Nash is her third baby. She was a college student when she gave birth to her first child and chose not to breastfeed; with her second child she had hopes to breastfeed and tried briefly but felt her child preferred the bottle/formula. Her goal is to breastfeed Aikl Nash if she is able to. Her history includes breast cancer with a right mastectomy, and left breast reduction in 2021. We discussed variation in milk supply after reduction and latching/pumping frequently per recommendation, holding skin to skin often, while we wait to see what her body can do. Her other diagnoses include anemia, asthma, and GERD; she denies medication use. She has already started to pump. Mom had questions about nutrition and hydration during pumping that we discussed. During our visit she put Akil Nash to breast, he was quite sleepy, however we were able to discuss supportive hold, positioning, latch, breast support and compressions, skin to skin benefits, and timing of pumpings around breastfeedings.  I:  I gave her a folder of introductory materials and went over pumping guidelines.  I reviewed physiology of colostrum and milk production, pumping guidelines, and I gave her a log and encouraged her to use it.   I explained how to access the videos \"Hand Expression\" and \"Maximizing Milk Production\"; as well as other helpful books and websites.   We discussed hands-on pumping techniques and usefulness of a hands-free pumping bra. She is using a bellyband with pumping and reports that she had some support through her insurance to obtain nursing bras and hands free pumping bras.  We discussed skin to skin holding and how to reach your breastfeeding goals.  We talked about medications during breastfeeding.  She verbalized understanding via teachback. Joesph believes her pump at home is an Airflow or Ameda Lizbet Moni, that she obtained through insurance.   A:  Mom has " information she needs to initiate her supply. Sleepy breastfeeding demonstration.   P: Will continue to provide lactation support.    MICAELA Marie, RN, IBCLC

## 2022-10-25 NOTE — PLAN OF CARE
Data: Vital signs within normal limits. Postpartum checks within normal limits - see flow record. Patient eating and drinking normally. Patient able to empty bladder independently and is up ambulating. No apparent signs of infection.  Incision  healing well, RACHEAL with steristrips. Patient performing self cares and is pumping and hand expressing for infant in NICU.  Baby attempting to breastfeed in NICU.  Action: Patient medicated during the shift for pain and cramping. See MAR. Patient reassessed within 1 hour after each medication and pain was improved - patient stated she was comfortable. Patient education done about incisional care, postpartum goals. See flow record.  Response: Positive attachment behaviors observed with infant. Support persons  present.   Plan: Anticipate discharge on POD 2-3.Goal Outcome Evaluation:

## 2022-10-25 NOTE — PLAN OF CARE
Goal Outcome Evaluation:  Done        Patient arrived to [facility] unit via paramedics ,with belongings, accompanied by paramedics at 1515 Got report from Moni ([facility] charge). Unit and room orientation done. No concerns a this time. Continue with plan of care.           patient  had mild elevated BP,  DR Chris was informed and assess patient. Patient had low urine output and concentratedBolus 500 ordered and started. . Johnston in (pt came with it from [place]). MD consented  it to stay in. Nausea's and intervene with  Zofran, currently patient eating.

## 2022-10-25 NOTE — PLAN OF CARE
Goal Outcome Evaluation:             VSS. Afebrile. Up ad socorro. Voiding. Encouraged to void every 3-4hrs. Pumping frequently. Down to NICU to visit baby.  Family here and supportive. Will continue to monitor.

## 2022-10-25 NOTE — H&P
History and Physical   2022  Dave Nesbitt  [id number]      HPI: Dave Nesbitt is a 32 year old , POD#0 s/p RLTCS at 35w6d at [facility] after presenting there in  labor. She was transferred here for routine pp cares in the setting of infant needing to be here for higher level of NICU care.    Since delivery this morning, she has been unable to tolerate food, getting incredibly nauseous when she sits up in bed. She has had n/v several times today. Feeling hungry so frustrated she hasn't been able to eat. Denies any incisional pain at the moment. Lochia present.  Johnston in place. She has not yet passed flatus or ambulated.  She denies headache, changes in vision, nausea/vomiting, chest pain, shortness of breath, RUQ pain, or worsening edema.    ROS: No headaches, vision changes, nausea, vomiting, fevers, chills, chest pain, SOB, abdominal pain, constipation, diarrhea, dysuria, and foul-odor discharge    Her pregnancy was complicated by:  - GDMA2  - History of CS  - Suspected LGA   - Class II obesity  - Hx DCIS s/p right mastectomy  - Intrahepatic cholestasis of pregnancy  - GBS positive status  - clinically insignif RBC antibody  - COVID positive on 2022  - Asthma    OBHX:   OB History    Para Term  AB Living   3 3 2 1 0 3   SAB IAB Ectopic Multiple Live Births   0 0 0 0 3      # Outcome Date GA Lbr Marvin/2nd Weight Sex Delivery Anes PTL Lv   3  10/24/22 35w6d  3.433 kg (7 lb 9.1 oz) M CS-LTranv Spinal  SURAJ      Name: LANE,MALE-DAVE JARAMILLO      Apgar1: 9  Apgar5: 8   2 Term 14 40w0d  4.848 kg (10 lb 11 oz) F CS-Unspec   SURAJ      Birth Comments: Breech      Name: Rolavmercy   1 Term 08 40w0d  3.402 kg (7 lb 8 oz) F Vag-Spont   SURAJ      Name: Prakash AshrafHX:   Past Medical History:   Diagnosis Date     Anemia      Anti-mi-2 antibody present     anti-M (cold) red blood cell antibody     Breast cancer (H)       Gastroesophageal reflux disease      Mild intermittent asthma without complication      Obesity        SurgicalHX:   Past Surgical History:   Procedure Laterality Date     AS REDUCTION OF LARGE BREAST  2021      SECTION       CHOLECYSTECTOMY       right mastectomy  2021       Medications:   [COMPLETED] acetaminophen (TYLENOL) tablet 975 mg  [COMPLETED] ceFAZolin Sodium (ANCEF) injection 2 g  [COMPLETED] hydrOXYzine (ATARAX) tablet 50 mg  [COMPLETED] insulin NPH injection 13 Units  [COMPLETED] Morphine Sulfate (PF) injection 10 mg  [COMPLETED] oxytocin (PITOCIN) 30 units in 500 mL 0.9% NaCl infusion  [COMPLETED] sodium citrate-citric acid (BICITRA) solution 30 mL    acetone urine (KETOSTIX) test strip, Use to check urine for ketones every morning.  albuterol (PROAIR HFA/PROVENTIL HFA/VENTOLIN HFA) 108 (90 Base) MCG/ACT inhaler, Inhale 1-2 puffs into the lungs (Patient not taking: No sig reported)  alcohol swab prep pads, Use to swab area of injection/wendy as directed.  blood glucose (ONETOUCH VERIO IQ) test strip, Use to test blood sugar 4 times daily (before breakfast and 1 hour after the start of each meal).  Blood Glucose Monitoring Suppl (ONETOUCH VERIO FLEX SYSTEM) w/Device KIT, 1 each 4 times daily  ferrous sulfate (SLO-FE) 142 (45 Fe) MG CR tablet, Take 1 tablet (142 mg) by mouth daily  FOLIC ACID PO, Take 1 mg by mouth daily  insulin  UNIT/ML injection, 4 units before breakfast and 26 units at bedtime.  insulin pen needle (ULTICARE MICRO) 32G X 4 MM miscellaneous, Use 1 pen needle daily or as directed.  multivitamin w/minerals (THERA-VIT-M) tablet, Take 1 tablet by mouth daily (Patient not taking: No sig reported)  ondansetron (ZOFRAN ODT) 4 MG ODT tab, Take 1 tablet (4 mg) by mouth every 8 hours as needed for nausea (Patient not taking: Reported on 10/18/2022)  ondansetron (ZOFRAN ODT) 8 MG ODT tab, Take 1 tablet (8 mg) by mouth every 8 hours as needed for nausea (Patient not taking: No  sig reported)  ondansetron (ZOFRAN-ODT) 4 MG ODT tab, Take 2 tablets (8 mg) by mouth every 8 hours as needed for nausea  OneTouch Delica Lancets 33G MISC, 1 each 4 times daily  ursodiol (ACTIGALL) 300 MG capsule, Take 1 capsule (300 mg) by mouth 2 times daily        Allergies:  Allergies   Allergen Reactions     Diagnostic X-Ray Materials Shortness Of Breath, Other (See Comments) and Dizziness     Contrast dye-Shortness of breath  Pt became sleepy and confused. Needed to remind the pt to keep breathing, but denied any itching, swelling, or breathing difficulties.     Naproxen Shortness Of Breath     Phentermine Other (See Comments)     Palpitations, chest pain     Vancomycin Itching, Other (See Comments) and Rash     After few minutes, redness and itching noted in forearm. Symptoms diminished in few minutes after rate decreased by 1/2.    Patient has history of vancomycin infusion reaction. For further doses, vancomycin should be infused at a rate no higher than 10 mg/min or each gram over 100 minutes.  May also consider administering diphenhydramine and famotidine one hour before infusion.  Rash all over body itchy       Kiwi        FamilyHX:  Family History   Problem Relation Age of Onset     No Known Problems Mother      Other - See Comments Father         murder at age 42     No Known Problems Sister      No Known Problems Sister      No Known Problems Brother      No Known Problems Brother      No Known Problems Maternal Grandmother      No Known Problems Maternal Grandfather      No Known Problems Paternal Grandmother      No Known Problems Paternal Grandfather        SocialHX:   Denies drinking, smoking, drug use in pregnancy    ROS: 10-point ROS negative except as indicated in HPI.    Physical Exam:  Vitals:    10/24/22 1524 10/24/22 1549   BP: (!) 137/91 (!) 141/96   Pulse: 81 91   Resp:  18   Temp: 98.3  F (36.8  C)    TempSrc: Oral    SpO2:  94%     General: alert, oriented female, resting in bed in  NAD  CV: regular rate and rhythm  Lungs: clear bilaterally, no crackles or wheezes  Abdomen: soft, gravid, non-tender, incision covered in sterile glue, c/d/i  Extremities: bilateral lower extremities non-tender with no edema    Prenatal Labs:      Lab Results   Component Value Date    AS Positive (A) 10/23/2022    HEPBANG Nonreactive 2022    HGB 11.0 (L) 10/24/2022     Assessment: 32 year old , POD#0 s/p RLTCS at 35w6d at [facility] after presenting there in  labor. She was transferred here for routine pp cares in the setting of infant needing to be here for higher level of NICU care. Doing well in early postop period.    Routine PP care  - Encourage routine post-operative goals including ambulation and incentive spirometry  - PNC: Rh pos. Rubella immune. No intervention indicated.  - Pain: controlled on oral medications  - Heme: Hgb 10.5> > 11. Will repeat AM labs. If <10 will discharge home with iron.  - GI: continue anti-emetics and stool softeners as needed.  - : Johnston in place, remove tonight or tomorrow.  - Infant: Stable in NICU  - Feeding: Need to discuss  - BC: Need to discuss    GDMA2  - FBG in Am    GHTN  - diagnosed here based on MR BP, with additional MR BP x1 seen in prenatal records  - HELLP labs wnl on admission  - asymptomatic  - will continue to monitor    Discharge to home on POD#2-3    The patient was discussed with Dr. Chris who is in agreement with the treatment plan.    Hortencia Pitts MD  ObGyn Resident, PGY-3  10/24/2022 7:45 PM       Physician Attestation   I, Laney Chris, personally saw and evaluated Brandee Pink.  I have reviewed her vitals, labs and performed an exam.  I discussed with the resident their admission plan and agree with the note above.       Laney Chris MD  Date of Service (when I saw the patient): 10/24/22

## 2022-10-25 NOTE — PLAN OF CARE
Goal Outcome Evaluation:  Vital signs have been stable- see flowsheet. Around 0330 today, pt reported chills and had a temp of 99.8 but has subsided throughout shift. Pain is well controlled by tylenol. PP assessment is WDL. Pt has alcala in and output is adequate. Pt is breast pumping every 2-3hrs around the clock. Spoke with provider regarding toradol- order has been discontinue due to allergies. Continue with plan of care.

## 2022-10-25 NOTE — TELEPHONE ENCOUNTER
Paperwork completed and signed by Dr. Schmidt faxed to [phone].    Sharita Henderson   Clinic Station    [facility]  [phone]

## 2022-10-25 NOTE — PLAN OF CARE
Goal Outcome Evaluation:  [id number]  Patient pleasant. Patient VSS and WNL. Patient is nauseaous and has had vomitting this shift. Patient encouraged to take small sips of water to let stomach settle with nausea meds. Patient hungry and wants to eat. Patient agreed to plan. Patient's spouse arrived about 2100.  Patient wanted to visit NICU. Patient was asked to go when feeling better and not vomitting. At 2130 patient requested a popsicle and was able to keep down. Patient asked for additional. Patient agreed to wait until 2200 to allow time for stomach to settle. 2215 patient was given additional popsicle and was able to keep down. Patient was then escorted to the NICU with spouse to see infant. Patient was taken at 2230 and returned at 2300.  Patient wanted soup, it was made and then she declined. Patient asked for additional popsicle and patient was able to keep down. Patient is pumping every 2 hours for infant. No blood pressures on RIGHT arm due to mastectomy of right breast. Continue plan of care.

## 2022-10-25 NOTE — PROGRESS NOTES
[facility]  MATERNAL CHILD HEALTH   INITIAL NICU PSYCHOSOCIAL ASSESSMENT     DATA:     Presenting Information: Mom is a  who delivered an infant boy on 10/24/2022 at 35w6d gestation in the setting of . Baby was admitted to the NICU for RDS further evaluation and management.  SW was consulted to meet with this family per NICU admission of infant.    Living Situation: Parents are  and live together in their home in [place]. Family currently owns their home.     Social Support: Family identifies their extended family are supportive to them and they have friends that they are close to.     Education/Employment: Mother is full time employed through a bank and father is a full time in the [employer].     Insurance: Chart review indicates [employer] commercial plan     Source of Financial Support: Parental employment    Mental Health History: Mom denies any formal diagnosis or need for mental health supports, although mom describes her own personal guilt for struggles with her lactation and being able to supply milk for her . Mom shares that she has undergone medical procedures that make it 50% chance that she will be able to produce milk which is a source of frustration and guilt for her.     History of Postpartum Mood Disorders: denies    Chemical Health History: deferred    Legal/Child Protection Involvement: deferred    INTERVENTION:       Chart review    Conducted Psychosocial Assessment    Introduction to Maternal Child Health SW role and scope of practice    Orientation to the NICU (parking, lodging, meals, visitation)    Validated emotions and provided supportive listening    Provided resources and referrals    Week parking pass     Provided psychoeducation on  mood disorders and indicated that SW would continue to monitor mood and support bridging to mental health resources as needed.    Provided SW contact info    ASSESSMENT:      Coping: Overall coping appropriately. Mom and father expressed desire to engage with social work if needed with any lodging accommodations. SW provided a basic overview of resources and encouraged family to take it one day at time and primary social work will be available to support specific questions. Father expressed interest with receiving a work-letter.    Assessment of parental risk for PMAD: Higher than average risk, considering medically complexity     Resiliency Factors & Strengths: loving, caring, engaged     PLAN:     SW will continue to follow for supportive intervention.    Primary :  ARY Shaffer, MACY    [address] [place] [id number]  [email or web address]  [facility] CenterBardol[facility] Grille[email or web address].org  Office: [phone]  Pager: [phone]          ARY Camacho, MACY, [facility]  Pediatric Float    Office: [phone]  Email: [email or web address]  After hours and weekend pager: [phone]  *NO LETTER*

## 2022-10-25 NOTE — DISCHARGE SUMMARY
DELIVERY DISCHARGE SUMMARY    Admit date: 10/24/2022  Discharge date: 10/28/2022    Admit Dx:   - 32 year old y/o   - POD#0 s/p RLTCS  - GDMA2  - Class II obesity  - Hx DCIS s/p right mastectomy  - Intrahepatic cholestasis of pregnancy  - GBS positive status  - clinically insignif RBC antibody  - COVID positive on 2022  - Asthma    Discharge Dx:  - Same as above  - Gestational hypertension    Procedures:  - None    Admit HPI:  Ms. Brandee Nesbitt is a 32 year old , POD#0 s/p RLTCS at 35w6d at [facility] after presenting there in  labor. She was transferred here for routine pp cares in the setting of infant needing to be here for higher level of NICU care. Doing well in early postop period.  Please see her admit H&P for full details of her PMH, PSH, Meds, Allergies and exam on admit.    Hospital course:  Her postoperative course was uncomplicated. On POD#2 she had a mild range BP, with a second mild range BP on POD#3, qualifying her for gestational hypertension. Nifedipine 30mg daily was started on POD#3 and she was kept inpatient for blood pressure management. Also on POD#3 she noted a change in her left breast after initiating pumping, and was advised to continue breast awareness and if the firmness/tenderness persists until 1 week postpartum, re-evaluate for possible imaging.  On POD#4, she was meeting all of her postpartum goals and deemed stable for discharge. She was voiding without difficulty, tolerating a regular diet without nausea and vomiting, her pain was well controlled on oral pain medicines and her lochia was appropriate. Her hemoglobin prior to delivery was 10.5 and after delivery was 9.9. Her Rh status was pos and Rhogam was not indicated.    Discharge Medications:     Review of your medicines      UNREVIEWED medicines. Ask your doctor about these medicines      Dose / Directions   albuterol 108 (90 Base) MCG/ACT inhaler  Commonly known as: PROAIR  HFA/PROVENTIL HFA/VENTOLIN HFA      Dose: 1-2 puff  Inhale 1-2 puffs into the lungs  Refills: 0     multivitamin w/minerals tablet      Dose: 1 tablet  Take 1 tablet by mouth daily  Refills: 0     * ondansetron 8 MG ODT tab  Commonly known as: ZOFRAN ODT      Dose: 8 mg  Take 1 tablet (8 mg) by mouth every 8 hours as needed for nausea  Quantity: 20 tablet  Refills: 4     * ondansetron 4 MG ODT tab  Commonly known as: ZOFRAN ODT  Used for: Nausea      Dose: 4 mg  Take 1 tablet (4 mg) by mouth every 8 hours as needed for nausea  Quantity: 20 tablet  Refills: 0         * This list has 2 medication(s) that are the same as other medications prescribed for you. Read the directions carefully, and ask your doctor or other care provider to review them with you.            START taking      Dose / Directions   acetaminophen 325 MG tablet  Commonly known as: TYLENOL  Used for: History of  section      Dose: 650 mg  Take 2 tablets (650 mg) by mouth every 6 hours as needed for mild pain Start after Delivery.  Quantity: 100 tablet  Refills: 0     ibuprofen 600 MG tablet  Commonly known as: ADVIL/MOTRIN  Used for: History of  section      Dose: 600 mg  Take 1 tablet (600 mg) by mouth every 6 hours as needed for moderate pain Start after delivery  Quantity: 60 tablet  Refills: 0     NIFEdipine ER 30 MG 24 hr tablet  Commonly known as: ADALAT CC  Used for: History of  section      Dose: 30 mg  Take 1 tablet (30 mg) by mouth daily  Quantity: 60 tablet  Refills: 1     senna-docusate 8.6-50 MG tablet  Commonly known as: SENOKOT-S/PERICOLACE  Used for: History of  section      Dose: 1 tablet  Take 1 tablet by mouth daily Start after delivery.  Quantity: 100 tablet  Refills: 0        CONTINUE these medicines which have NOT CHANGED      Dose / Directions   acetone urine test strip  Commonly known as: KETOSTIX  Used for: History of macrosomia in infant in prior pregnancy, currently pregnant, Diet controlled  gestational diabetes mellitus (GDM) in second trimester      Use to check urine for ketones every morning.  Quantity: 50 strip  Refills: 3     alcohol swab prep pads  Used for: Insulin controlled gestational diabetes mellitus (GDM) in second trimester      Use to swab area of injection/wendy as directed.  Quantity: 100 each  Refills: 3     ferrous sulfate 142 (45 Fe) MG CR tablet  Commonly known as: SLO-FE  Used for: Insulin controlled gestational diabetes mellitus (GDM) in third trimester      Dose: 142 mg  Take 1 tablet (142 mg) by mouth daily  Quantity: 30 tablet  Refills: 2     FOLIC ACID PO      Dose: 1 mg  Take 1 mg by mouth daily  Refills: 0     * ondansetron 4 MG ODT tab  Commonly known as: ZOFRAN ODT      Dose: 8 mg  Take 2 tablets (8 mg) by mouth every 8 hours as needed for nausea  Quantity: 10 tablet  Refills: 0     OneTouch Delica Lancets 33G Misc  Used for: History of macrosomia in infant in prior pregnancy, currently pregnant, Diet controlled gestational diabetes mellitus (GDM) in second trimester      Dose: 1 each  1 each 4 times daily  Quantity: 200 each  Refills: 4     OneTouch Verio Flex System w/Device Kit  Used for: History of macrosomia in infant in prior pregnancy, currently pregnant, Diet controlled gestational diabetes mellitus (GDM) in second trimester      Dose: 1 each  1 each 4 times daily  Quantity: 1 kit  Refills: 0     ONETOUCH VERIO IQ test strip  Used for: History of macrosomia in infant in prior pregnancy, currently pregnant, Diet controlled gestational diabetes mellitus (GDM) in second trimester  Generic drug: blood glucose      Use to test blood sugar 4 times daily (before breakfast and 1 hour after the start of each meal).  Quantity: 150 strip  Refills: 4     ULTICARE MICRO 32G X 4 MM miscellaneous  Used for: Insulin controlled gestational diabetes mellitus (GDM) in second trimester  Generic drug: insulin pen needle      Use 1 pen needle daily or as directed.  Quantity: 50  each  Refills: 3         * This list has 1 medication(s) that are the same as other medications prescribed for you. Read the directions carefully, and ask your doctor or other care provider to review them with you.            STOP taking    insulin  UNIT/ML injection        ursodiol 300 MG capsule  Commonly known as: ACTIGALL              Where to get your medicines      These medications were sent to [facility] [place], [place] - [address]  [address] [facility] [address], [facility] [address]    Phone: [phone]     acetaminophen 325 MG tablet    ferrous sulfate 142 (45 Fe) MG CR tablet    ibuprofen 600 MG tablet    NIFEdipine ER 30 MG 24 hr tablet    senna-docusate 8.6-50 MG tablet             Discharge/Disposition:  Brandee Nesbitt was discharged to home in stable condition with the following instructions/medications:  1) Call for temperature > 100.4, bright red vaginal bleeding >1 pad an hour x 2 hours, foul smelling vaginal discharge, pain not controlled by usual oral pain meds, persistent nausea and vomiting not controlled on medications, drainage or redness from incision site  2) She declined for contraception.  3) For feeding she decided to breastfeed.  4) Gestational hypertension: Continue nifedipine. Use home BP cuff to take BP daily, return to clinic at 1 week postpartum for blood pressure check. Call for any BP > 140/90, headache that persists after treatment, change in vision, chest pain, SOB, abdominal pain, edema.  4) She was instructed to follow-up with her primary OB in 6 weeks for a routine postpartum visit.  5) Discharge activity:  No heavy lifting >15 lbs or strenuous activity for 6 weeks, pelvic rest for 6 weeks, no driving or operating machinery while on narcotics.    Hortencia Pitts MD  ObGyn Resident, PGY-3  10/28/2022       Physician Attestation   I, Laney Chris, have seen and examined this patient.  I have reviewed the above note and agree with  discharge plan as documented in the above note.     Laney Chris MD  Date of Service (when I saw the patient): 10/28/22

## 2022-10-26 LAB
GLUCOSE BLDC GLUCOMTR-MCNC: 120 MG/DL (ref 70–99)
Lab: NORMAL
PATH REPORT.COMMENTS IMP SPEC: NORMAL
PATH REPORT.COMMENTS IMP SPEC: NORMAL
PATH REPORT.FINAL DX SPEC: NORMAL
PATH REPORT.GROSS SPEC: NORMAL
PATH REPORT.MICROSCOPIC SPEC OTHER STN: NORMAL
PATH REPORT.RELEVANT HX SPEC: NORMAL
PERFORMING LABORATORY: NORMAL
PHOTO IMAGE: NORMAL
SPECIMEN STATUS: NORMAL
TEST NAME: NORMAL

## 2022-10-26 PROCEDURE — 250N000013 HC RX MED GY IP 250 OP 250 PS 637: Performed by: STUDENT IN AN ORGANIZED HEALTH CARE EDUCATION/TRAINING PROGRAM

## 2022-10-26 PROCEDURE — 120N000002 HC R&B MED SURG/OB UMMC

## 2022-10-26 PROCEDURE — 250N000011 HC RX IP 250 OP 636: Performed by: STUDENT IN AN ORGANIZED HEALTH CARE EDUCATION/TRAINING PROGRAM

## 2022-10-26 PROCEDURE — 88307 TISSUE EXAM BY PATHOLOGIST: CPT | Mod: 26 | Performed by: PATHOLOGY

## 2022-10-26 RX ORDER — CETIRIZINE HYDROCHLORIDE 10 MG/1
10 TABLET ORAL DAILY
Status: DISCONTINUED | OUTPATIENT
Start: 2022-10-26 | End: 2022-10-28 | Stop reason: HOSPADM

## 2022-10-26 RX ORDER — ALBUTEROL SULFATE 90 UG/1
1-2 AEROSOL, METERED RESPIRATORY (INHALATION) EVERY 4 HOURS PRN
Status: DISCONTINUED | OUTPATIENT
Start: 2022-10-26 | End: 2022-10-28 | Stop reason: HOSPADM

## 2022-10-26 RX ADMIN — ACETAMINOPHEN 975 MG: 325 TABLET, FILM COATED ORAL at 16:58

## 2022-10-26 RX ADMIN — ACETAMINOPHEN 975 MG: 325 TABLET, FILM COATED ORAL at 22:55

## 2022-10-26 RX ADMIN — ACETAMINOPHEN 975 MG: 325 TABLET, FILM COATED ORAL at 04:53

## 2022-10-26 RX ADMIN — IBUPROFEN 800 MG: 800 TABLET, FILM COATED ORAL at 04:53

## 2022-10-26 RX ADMIN — SIMETHICONE 80 MG: 80 TABLET, CHEWABLE ORAL at 15:28

## 2022-10-26 RX ADMIN — SENNOSIDES AND DOCUSATE SODIUM 2 TABLET: 50; 8.6 TABLET ORAL at 21:39

## 2022-10-26 RX ADMIN — SENNOSIDES AND DOCUSATE SODIUM 2 TABLET: 50; 8.6 TABLET ORAL at 07:31

## 2022-10-26 RX ADMIN — SIMETHICONE 80 MG: 80 TABLET, CHEWABLE ORAL at 07:31

## 2022-10-26 RX ADMIN — SIMETHICONE 80 MG: 80 TABLET, CHEWABLE ORAL at 22:55

## 2022-10-26 RX ADMIN — ENOXAPARIN SODIUM 40 MG: 40 INJECTION SUBCUTANEOUS at 09:43

## 2022-10-26 RX ADMIN — IBUPROFEN 800 MG: 800 TABLET, FILM COATED ORAL at 22:55

## 2022-10-26 RX ADMIN — CETIRIZINE HYDROCHLORIDE 10 MG: 10 TABLET, FILM COATED ORAL at 09:43

## 2022-10-26 RX ADMIN — IBUPROFEN 800 MG: 800 TABLET, FILM COATED ORAL at 16:58

## 2022-10-26 RX ADMIN — ACETAMINOPHEN 975 MG: 325 TABLET, FILM COATED ORAL at 10:59

## 2022-10-26 RX ADMIN — IBUPROFEN 800 MG: 800 TABLET, FILM COATED ORAL at 10:59

## 2022-10-26 ASSESSMENT — ACTIVITIES OF DAILY LIVING (ADL)
ADLS_ACUITY_SCORE: 20

## 2022-10-26 NOTE — PLAN OF CARE
Goal Outcome Evaluation:       Data: BPs mild range, otherwise VSS. Postpartum checks within normal limits - see flow record. Patient eating and drinking normally. Patient able to empty bladder independently and is up ambulating. No apparent signs of infection. Incision healing well, open to air with liquid glue. Complained of feeling feverish and chilled, Temp MAX 99.6F oral.  Updated Dr Pretty G2 of patient concerns for mastitis and also feeling lump in L breast.  They will come evaluate once patient back form NICU.  Action: Patient medicated during the shift for pain and cramping. See MAR. Patient reassessed within 1 hour after each medication and pain was improved - patient stated she was comfortable. Patient education done about engorgement vs masitis, fever parameters postpartum, pump frequency and cleaning. See flow record.  Response: Positive attachment behaviors observed with infant in NICU. Support persons  present.   Plan: Anticipate discharge tomorrow.

## 2022-10-26 NOTE — PROGRESS NOTES
Post Partum Progress Note  PPD#2    Subjective:  She is resting comfortably in bed this morning. Complains developed nasal congestion and feeling feverish. Has minimal pain. Tolerating PO with no n/v. Lochia present and similar to menses. Voiding. She is ambulating without dizziness or difficulty.  She denies headache, changes in vision, nausea/vomiting, chest pain, shortness of breath, RUQ pain, or worsening edema.  Plans to breastfeed.    Objective:  Vitals:    10/25/22 2117 10/26/22 0042 10/26/22 0457 10/26/22 0733   BP: 121/79 113/81 124/84 130/79   BP Location: Left arm Left arm Left arm    Patient Position: Semi-Marion's Semi-Marion's Semi-Marion's    Cuff Size: Adult Regular Adult Regular Adult Regular    Pulse: 101 87 99 79   Resp: 18 18 18 16   Temp: 99  F (37.2  C) 98.8  F (37.1  C) 97.6  F (36.4  C) 97  F (36.1  C)   TempSrc: Oral Oral Oral Axillary   SpO2: 96%   97%   Weight:    112.2 kg (247 lb 4.8 oz)       General: NAD. A&Ox3.  CV: Regular rate, well perfused.   Pulm: Normal respiratory effort.  Abd: Soft, non-tender, non-distended. Fundus is firm and 1 cm below the umbilicus.    Incision: covered in glue, clean, dry, intact  Ext: no lower extremity edema bilaterally. No calf tenderness.    Assessment/Plan:  Brandee Nesbitt is a 32 year old  female who is POD#2 s/p RLTCS at 35 weeks after presenting in  labor. Doing well in the early postop period. Will add zyrtec for nasal congestion.    Routine PP care  - Encourage routine post-operative goals including ambulation and incentive spirometry  - PNC: Rh pos. Rubella immune. No intervention indicated.  - Pain: controlled on oral medications  - Heme: Hgb 10.5> > 11> 9.9. Asymptomatic  Will discharge home with iron.  - GI: continue anti-emetics and stool softeners as needed.  - : Johnston in place.  - Infant: Stable in NICU  - Feeding: Plans on breastfeeding.  - BC: need to discuss  - PPX: lovenox while inpatient (BMI >35,  c/s)    GDMA2  -   - Plan for 2h GTT at pp visit    GHTN   - diagnosed postpartum for MR BP as well as single MR BP at prenatal visit  - HELLP labs wnl (10/24)  - Asymptomatic  - Plan discharge with BP cuff and close interval follow up    Asthma, stable  Hx DCIS, s/p R mastectomy    Discharge to home on POD#2-3    Hortencia Pitts MD  ObGyn Resident, PGY-3  8:45 AM 10/26/2022     Patient seen and examined by me, agree with above resident note. Overall doing well and meeting early goals.  Ambulation encouraged, cont routine cares    DAHIANA MEJIA MD

## 2022-10-26 NOTE — PROVIDER NOTIFICATION
Could you please restart patient's albuteral inhaler prn? Patient requesting.     10/26/22 0000   Provider Notification   Provider Name/Title dr anne scheiesing   Method of Notification Electronic Page   Request Evaluate-Remote   Notification Reason Medication Request

## 2022-10-27 LAB
GLUCOSE BLDC GLUCOMTR-MCNC: 100 MG/DL (ref 70–99)
PLATELET # BLD AUTO: 234 10E3/UL (ref 150–450)

## 2022-10-27 PROCEDURE — 250N000013 HC RX MED GY IP 250 OP 250 PS 637: Performed by: STUDENT IN AN ORGANIZED HEALTH CARE EDUCATION/TRAINING PROGRAM

## 2022-10-27 PROCEDURE — 36415 COLL VENOUS BLD VENIPUNCTURE: CPT | Performed by: STUDENT IN AN ORGANIZED HEALTH CARE EDUCATION/TRAINING PROGRAM

## 2022-10-27 PROCEDURE — 85049 AUTOMATED PLATELET COUNT: CPT | Performed by: STUDENT IN AN ORGANIZED HEALTH CARE EDUCATION/TRAINING PROGRAM

## 2022-10-27 PROCEDURE — 250N000011 HC RX IP 250 OP 636: Performed by: STUDENT IN AN ORGANIZED HEALTH CARE EDUCATION/TRAINING PROGRAM

## 2022-10-27 PROCEDURE — 120N000002 HC R&B MED SURG/OB UMMC

## 2022-10-27 RX ORDER — ACETAMINOPHEN 325 MG/1
650 TABLET ORAL EVERY 6 HOURS PRN
Qty: 100 TABLET | Refills: 0 | Status: SHIPPED | OUTPATIENT
Start: 2022-10-27 | End: 2023-01-20

## 2022-10-27 RX ORDER — IBUPROFEN 600 MG/1
600 TABLET, FILM COATED ORAL EVERY 6 HOURS PRN
Qty: 60 TABLET | Refills: 0 | Status: SHIPPED | OUTPATIENT
Start: 2022-10-27 | End: 2023-01-20

## 2022-10-27 RX ORDER — NIFEDIPINE 30 MG/1
30 TABLET, EXTENDED RELEASE ORAL DAILY
Status: DISCONTINUED | OUTPATIENT
Start: 2022-10-27 | End: 2022-10-28 | Stop reason: HOSPADM

## 2022-10-27 RX ORDER — AMOXICILLIN 250 MG
1 CAPSULE ORAL DAILY
Qty: 100 TABLET | Refills: 0 | Status: SHIPPED | OUTPATIENT
Start: 2022-10-27 | End: 2023-01-20

## 2022-10-27 RX ADMIN — IBUPROFEN 800 MG: 800 TABLET, FILM COATED ORAL at 23:11

## 2022-10-27 RX ADMIN — IBUPROFEN 800 MG: 800 TABLET, FILM COATED ORAL at 05:15

## 2022-10-27 RX ADMIN — ENOXAPARIN SODIUM 40 MG: 40 INJECTION SUBCUTANEOUS at 10:01

## 2022-10-27 RX ADMIN — ACETAMINOPHEN 975 MG: 325 TABLET, FILM COATED ORAL at 11:22

## 2022-10-27 RX ADMIN — NIFEDIPINE 30 MG: 30 TABLET, FILM COATED, EXTENDED RELEASE ORAL at 10:00

## 2022-10-27 RX ADMIN — CETIRIZINE HYDROCHLORIDE 10 MG: 10 TABLET, FILM COATED ORAL at 10:00

## 2022-10-27 RX ADMIN — IBUPROFEN 800 MG: 800 TABLET, FILM COATED ORAL at 17:15

## 2022-10-27 RX ADMIN — ACETAMINOPHEN 975 MG: 325 TABLET, FILM COATED ORAL at 05:15

## 2022-10-27 RX ADMIN — ACETAMINOPHEN 975 MG: 325 TABLET, FILM COATED ORAL at 23:12

## 2022-10-27 RX ADMIN — SENNOSIDES AND DOCUSATE SODIUM 1 TABLET: 50; 8.6 TABLET ORAL at 20:44

## 2022-10-27 RX ADMIN — SENNOSIDES AND DOCUSATE SODIUM 1 TABLET: 50; 8.6 TABLET ORAL at 10:00

## 2022-10-27 RX ADMIN — ACETAMINOPHEN 975 MG: 325 TABLET, FILM COATED ORAL at 17:14

## 2022-10-27 RX ADMIN — IBUPROFEN 800 MG: 800 TABLET, FILM COATED ORAL at 11:22

## 2022-10-27 ASSESSMENT — ACTIVITIES OF DAILY LIVING (ADL)
ADLS_ACUITY_SCORE: 20

## 2022-10-27 NOTE — PROGRESS NOTES
Post Partum Progress Note  PPD#4    Subjective:  She is resting comfortably in bed this morning. She complains of LE swelling. Pain is improving and well controlled on current medication regimen. She is tolerating PO intake. Lochia present and scant.  She is voiding without difficulty. She has passed flatus and has not yet had a BM. She is ambulating without dizziness or difficulty.  She denies headache, changes in vision, nausea/vomiting, chest pain, shortness of breath, RUQ pain, or worsening edema.  Plans to breastfeed.    Objective:  Vitals:    10/27/22 0954 10/27/22 1636 10/27/22 2043 10/28/22 0500   BP: 119/80 139/88 135/87 137/89   BP Location: Left arm Left arm Left arm Left arm   Patient Position: Chair Sitting Semi-Marion's Sitting   Cuff Size: Adult Regular Adult Regular Adult Regular Adult Regular   Pulse: 82 80 93 84   Resp: 17 16 16 16   Temp: 97.6  F (36.4  C) 98.6  F (37  C) 99  F (37.2  C) 98  F (36.7  C)   TempSrc: Oral Oral Oral Oral   SpO2:       Weight:           General: NAD. A&Ox3.  CV: Regular rate, well perfused.   Pulm: Normal respiratory effort.  Abd: Soft, non-tender, non-distended. Fundus is firm and 1 cm below the umbilicus.    Incision: covered in glue, clean, dry, intact  Ext: no lower extremity edema bilaterally. No calf tenderness.    Assessment/Plan:  Brandee Nesbitt is a 32 year old  female who is POD#4 s/p RLTCS at 35 weeks after presenting in  labor. Doing well in the early postop period.    Routine PP care  - Encourage routine post-operative goals including ambulation and incentive spirometry  - PNC: Rh pos. Rubella immune. No intervention indicated.  - Pain: controlled on oral medications  - Heme: Hgb 10.5> > 11> 9.9. Asymptomatic  Will discharge home with iron.  - GI: continue anti-emetics and stool softeners as needed.  - : Johnston in place.  - Infant: Stable in NICU  - Feeding: Plans on breastfeeding.  - BC: declines. Reviewed pregnancy  spacing  - PPX: lovenox while inpatient (BMI >35, c/s)    GDMA2  -   - Plan for 2h GTT at pp visit    GHTN   - BP mild range, now D#2 Procardia XL 30mg daily  - HELLP labs wnl (10/24)  - Asymptomatic  - Plan discharge with BP cuff and close interval follow up    Asthma, stable  Hx DCIS, s/p R mastectomy    Discharge to home today    Hortencia Pitts MD  ObGyn Resident, PGY-3  8:18 AM 10/28/2022       Physician Attestation   I, Laney Chris, personally saw and examined Brandee Pink.  I have reviewed her vitals, labs, and medications. I have discussed the plan of care with the resident.   She is doing well. Will be discharged today to boarding room to be with her baby. Baby has BG instability so will be here a little longer.   Hemoglobin   Date Value Ref Range Status   10/25/2022 9.9 (L) 11.7 - 15.7 g/dL Final   10/24/2022 11.0 (L) 11.7 - 15.7 g/dL Final      Recent Labs   Lab 10/27/22  0518 10/26/22  0530 10/25/22  0559 10/24/22  1350 10/24/22  0807 10/23/22  2205   * 120* 108* 68* 73 130*     discussed routine post op/ pp cares and normal expectations for recovery.  Plan discharge to boarding room. Will give patient a BP cuff prior to discharge and have her follow-up in clinic in 1 wk for BP check.      Laney Chris MD  Date of Service (when I saw the patient): October 28, 2022

## 2022-10-27 NOTE — PROGRESS NOTES
Post Partum Progress Note  PPD#3    Subjective:  She is resting comfortably in bed this morning. Continues to note nasal congestion, reports it improved with zyrtec yesterday. Also notes new firmness in left breast that is tender to palpation, concerning to her given her history of DCIS and R mastectomy. She has been pumping since delivery and just yesterday started producing colostrum. Reports that she gets annual mammograms, last one was 2022, per chart review BI-RADS Cat 2, benign.    Denies SOB, cough, has not felt feverish since yesterday. Minimal pain. Tolerating PO without nausea/vomiting. Lochia present, like moderate menses. Voiding spontaneously. Ambulating without dizziness or lightheadedness. She denies headache, changes in vision, nausea/vomiting, chest pain, shortness of breath, RUQ pain, or worsening edema.  Plans to breastfeed.    Objective:  Patient Vitals for the past 24 hrs:   BP Temp Temp src Pulse Resp   10/27/22 1636 139/88 98.6  F (37  C) Oral 80 16   10/27/22 0954 119/80 97.6  F (36.4  C) Oral 82 17   10/27/22 0520 (!) 144/90 97.5  F (36.4  C) Oral 81 16   10/26/22 2248 136/86 98.4  F (36.9  C) Oral 83 16   ]    General: NAD. A&Ox3.  CV: Regular rate, well perfused.   Pulm: Normal respiratory effort.  Abd: Soft, non-tender, non-distended. Fundus is firm and 1 cm below the umbilicus.    Breast: left breast with 3-4cm long x 2cm wide firmness extending horizontally at 9 o'clock, 2-3 cm from nipple. Mild tenderness to palpation. No skin or nipple retractions. No erythema, focal edema, fluctuance, or skin changes appreciated.  Incision: covered in glue, clean, dry, intact  Ext: 1+ bilateral lower extremity edema from feet to distal leg/ankle. No calf tenderness.    Assessment/Plan:  Brandee Nesbitt is a 32 year old  female who is POD#3 s/p RLTCS at 35 weeks after presenting in  labor. Doing well in the early postop period. Will continue Zyrtec for  congestion.    Routine PP care  - Encourage routine post-operative goals including ambulation and incentive spirometry  - PNC: Rh pos. Rubella immune. No intervention indicated.  - Pain: controlled on oral medications  - Heme: Hgb 10.5> > 11> 9.9. Asymptomatic  Will discharge home with iron.  - GI: continue anti-emetics and stool softeners as needed.  - : Voiding spontaneously  - Infant: Stable, on floor with Peds  - Feeding: Currently pumping. Plans on breastfeeding.  - BC: need to discuss  - PPX: lovenox while inpatient (BMI >35, c/s)    GDMA2  -   - Plan for 2h GTT at pp visit    GHTN   - diagnosed postpartum for MR BP as well as single MR BP at prenatal visit  - Repeat MR BP this morning; will start Nifedipine 30mg daily and monitor blood pressures today  - HELLP labs wnl (10/24)  - Asymptomatic  - Monitor   - Plan discharge with BP cuff and close interval follow up (1 week postpartum clinic visit). Patient has a blood pressure cuff at home.    Left breast pain   Hx DCIS  S/p R mastectomy  - new focal left breast tenderness in early postpartum setting of pumping. Exam consistent with physiologic changes associated with milk production, less suspicious for infection, mastitis, malignancy. Recommended patient stay aware of it over the next week, and if it persists and is unchanged by 1 week blood pressure check appointment, may re-evaluate and consider imaging.    Asthma, stable      Discharge to home on POD#4    Elidia Knott MD  OB/Gyn PGY-1  10/27/22 8:22 AM    Physician Attestation   I personally examined and evaluated this patient.  I discussed the patient with the resident/fellow and care team, and agree with the assessment and plan of care as documented in the note of 10/27/22.      I personally reviewed vital signs, medications and labs.    Johnson findings: Joesph Nesbitt is a 32 year old  POD#3 s/p RLTCS, with mild range BPs over the past 24 hours.  Patient denies any headache,  changes in vision, chest pain, chest pressure, shortness of breath.  Will start nifedipine 30mg XL daily and plan to monitor until tomorrow.  Plan discharge to home tomorrow.    Neetu Ortega MD  Date of Service (when I saw the patient): 10/27/22

## 2022-10-27 NOTE — PLAN OF CARE
Goal Outcome Evaluation:      Plan of Care Reviewed With: patient, spouse    Overall Patient Progress: improvingOverall Patient Progress: improving    Outcome Evaluation: VSS. BP in mild range this afternoon at 139/88. Pt started on Nifedipine today, education provided. Pt pumping left breast for infant in the NICU, getting around 10 ml colostrum with each pumping session. Pt tolerating regular diet, ambulating in room, voiding without difficulty and had a postpartum bowel movement. Postpartum checks WNL. Pt frequently visiting infant in the NICU today and is hoping to discharge to board with infant tomorrow.

## 2022-10-27 NOTE — PLAN OF CARE
Goal Outcome Evaluation:         Data: Vital signs within normal limits except mild range BP. Postpartum checks within normal limits - see flow record. Patient eating and drinking normally. Patient able to empty bladder independently and is up ambulating. No apparent signs of infection. Incision healing well. Patient performing self cares and is able to care for infant. Fasting glucose 100 this morning.   Action: Patient medicated during the shift for pain. See MAR. Patient reassessed within 1 hour after each medication and pain was improved - patient stated she was comfortable. Patient education done about plan of care. See flow record.  Response: Positive attachment behaviors observed with infant. Support person, , present.   Plan: Anticipate discharge today.

## 2022-10-28 VITALS
WEIGHT: 247.3 LBS | RESPIRATION RATE: 16 BRPM | TEMPERATURE: 98.9 F | DIASTOLIC BLOOD PRESSURE: 75 MMHG | HEART RATE: 75 BPM | OXYGEN SATURATION: 97 % | BODY MASS INDEX: 37.61 KG/M2 | SYSTOLIC BLOOD PRESSURE: 128 MMHG

## 2022-10-28 PROCEDURE — 250N000011 HC RX IP 250 OP 636: Performed by: STUDENT IN AN ORGANIZED HEALTH CARE EDUCATION/TRAINING PROGRAM

## 2022-10-28 PROCEDURE — 250N000013 HC RX MED GY IP 250 OP 250 PS 637: Performed by: STUDENT IN AN ORGANIZED HEALTH CARE EDUCATION/TRAINING PROGRAM

## 2022-10-28 RX ORDER — NIFEDIPINE 30 MG
30 TABLET, EXTENDED RELEASE ORAL DAILY
Qty: 60 TABLET | Refills: 1 | Status: SHIPPED | OUTPATIENT
Start: 2022-10-28 | End: 2023-01-20

## 2022-10-28 RX ADMIN — ACETAMINOPHEN 975 MG: 325 TABLET, FILM COATED ORAL at 11:26

## 2022-10-28 RX ADMIN — IBUPROFEN 800 MG: 800 TABLET, FILM COATED ORAL at 05:00

## 2022-10-28 RX ADMIN — ENOXAPARIN SODIUM 40 MG: 40 INJECTION SUBCUTANEOUS at 09:47

## 2022-10-28 RX ADMIN — ACETAMINOPHEN 975 MG: 325 TABLET, FILM COATED ORAL at 05:00

## 2022-10-28 RX ADMIN — SENNOSIDES AND DOCUSATE SODIUM 1 TABLET: 50; 8.6 TABLET ORAL at 09:46

## 2022-10-28 RX ADMIN — IBUPROFEN 800 MG: 800 TABLET, FILM COATED ORAL at 11:26

## 2022-10-28 RX ADMIN — CETIRIZINE HYDROCHLORIDE 10 MG: 10 TABLET, FILM COATED ORAL at 09:47

## 2022-10-28 RX ADMIN — NIFEDIPINE 30 MG: 30 TABLET, FILM COATED, EXTENDED RELEASE ORAL at 09:47

## 2022-10-28 ASSESSMENT — ACTIVITIES OF DAILY LIVING (ADL)
ADLS_ACUITY_SCORE: 20

## 2022-10-28 NOTE — PROGRESS NOTES
STEVE responded to page that family wants to  Check in before discharge. STEVE met with parents in the [facility] room. They report they are discharging today and baby moved up to 11th floor room. They report it is bittersweet to leave today because they have to leave baby Eldeen here, but are excited to return home and see their other children. SW validated this and provided supportive counseling.     Parents are asking about local accommodations because the pull out couch in the private room is hard with her c/s recovery. SW validated this and the only option is a list of local hotels that are lower cost since they are in a private room. They live 45 min away so want to stay closer. SW explored other options with them and they have family in [place] they might be able to stay with.     Mom reports this was a difficult pregnancy with multiple complications. SW discussed signs, symptoms and resources for  mental health concerns. SW offered to help connect pt with therapy, peer support and/or support groups. Parent has seen a therapist in the past, but is not interested. She relies on her meditation misael for stress relief and help sleeping, which works for her. SW validated this and encouraged mom to reach out if she needs further assistance connecting with resources.     Family has a parking pass until next Tuesday. They are hopeful they will be discharged before this.     STEVE discussed how to get social security card, insurance and birth certificate.     STEVE provided work letter to dad per his request.     STEVE will continue to follow.     AYR Shaffer, [facility]  Maternal and Child Health   Office: [phone]  Pager: [phone]  After Hours Pager: [phone]  [email or web address]

## 2022-10-28 NOTE — PROVIDER NOTIFICATION
10/28/22 1006   Provider Notification   Provider Name/Title Keya Kinsey   Method of Notification Electronic Page   Request Evaluate-Remote   Notification Reason Other  (Pt would like to see you! Has some questions!)     Keya returned page and will be up to see pt.

## 2022-10-28 NOTE — PLAN OF CARE
Goal Outcome Evaluation:    Problem: Postpartum ( Delivery)  Goal: Successful Maternal Role Transition  Outcome: Progressing  Goal: Effective Bowel Elimination  Outcome: Progressing     Problem: Plan of Care - These are the overarching goals to be used throughout the patient stay.    Goal: Plan of Care Review  Description: The Plan of Care Review/Shift note should be completed every shift.  The Outcome Evaluation is a brief statement about your assessment that the patient is improving, declining, or no change.  This information will be displayed automatically on your shift note.  Outcome: Progressing      VSS. Pt. Pain is controlled with tylenol and ibuprofen. Pt pumping left breast for infant in the NICU. Pt tolerating regular diet, ambulating in room and to the NICU. Voiding without difficulty and had a postpartum bowel movement. Postpartum checks WDL. Pt frequently visiting infant in the NICU, Spouse present and supportive at bedside.

## 2022-10-28 NOTE — DISCHARGE INSTRUCTIONS
Postop  Birth Instructions    Activity     Do not lift more than 10 pounds for 6 weeks after surgery.  Ask family and friends for help when you need it.  No driving until you have stopped taking your pain medications (usually two weeks after surgery).  No heavy exercise or activity for 6 weeks.  Don't do anything that will put a strain on your surgery site.  Don't strain when using the toilet.  Your care team may prescribe a stool softener if you have problems with your bowel movements.     To care for your incision:     Keep the incision clean and dry.  Do not soak your incision in water. No swimming or hot tubs until it has fully healed. You may soak in the bathtub if the water level is below your incision.  Do not use peroxide, gel, cream, lotion, or ointment on your incision.  Adjust your clothes to avoid pressure on your surgery site (check the elastic in your underwear for example).     You may see a small amount of clear or pink drainage and this is normal.  Check with your health care provider:     If the drainage increases or has an odor.  If the incision reddens, you have swelling, or develop a rash.  If you have increased pain and the medicine we prescribed doesn't help.  If you have a fever above 100.4 F (38 C) with or without chills when placing thermometer under your tongue.   The area around your incision (surgery wound), will feel numb.  This is normal. The numbness should go away in less than a year.     Keep your hands clean:  Always wash your hands before touching your incision (surgery wound). This helps reduce your risk of infection. If your hands aren't dirty, you may use an alcohol hand-rub to clean your hands. Keep your nails clean and short.    Call your healthcare provider if you have any of these symptoms:     You soak a sanitary pad with blood within 1 hour, or you see blood clots larger than a golf ball.  Bleeding that lasts more than 6 weeks.  Vaginal discharge that smells  bad.  Severe pain, cramping or tenderness in your lower belly area.  A need to urinate more frequently (use the toilet more often), more urgently (use the toilet very quickly), or it burns when you urinate.  Nausea and vomiting.  Redness, swelling or pain around a vein in your leg.  Problems breastfeeding or a red or painful area on your breast.  Chest pain and cough or are gasping for air.  Problems with coping with sadness, anxiety or depression. If you have concerns about hurting yourself or the baby, call your provider immediately.    You have questions or concerns after you return home.     Preeclampsia   Call your doctor right away if you have any of the following:  - Edema (swelling) in your face or hands  - Rapid weight gain-about 1 pound or more in a day  - Headache  - Abdominal pain on your right side  - Vision problems (flashes or spots)  - You have questions or concerns once you return home.

## 2022-10-30 ENCOUNTER — PATIENT OUTREACH (OUTPATIENT)
Dept: CARE COORDINATION | Facility: CLINIC | Age: 32
End: 2022-10-30

## 2022-10-30 NOTE — PROGRESS NOTES
Clinic Care Coordination Contact  [facility]: Post-Discharge Note  SITUATION                                                      Admission:    Admission Date: 10/24/22   Reason for Admission: Maternity care  Discharge:   Discharge Date: 10/28/22  Discharge Diagnosis: Maternity care    BACKGROUND                                                      Per hospital discharge summary and inpatient provider notes:    Ms. Brandee Nesbitt is a 32 year old , POD#0 s/p RLTCS at 35w6d at [facility] after presenting there in  labor. She was transferred here for routine pp cares in the setting of infant needing to be here for higher level of NICU care. Doing well in early postop period.  Please see her admit H&P for full details of her PMH, PSH, Meds, Allergies and exam on admit    ASSESSMENT           Discharge Assessment  How are you doing now that you are home?: I am doing good. Sometimes my feet are swollen but otherwise that is it.  How are your symptoms? (Red Flag symptoms escalate to triage hotline per guidelines): Improved  Do you feel your condition is stable enough to be safe at home until your provider visit?: Yes  Does the patient have their discharge instructions? : Yes  Does the patient have questions regarding their discharge instructions? : No  Were you started on any new medications or were there changes to any of your previous medications? : Yes  Does the patient have all of their medications?: Yes  Do you have questions regarding any of your medications? : No  Do you have all of your needed medical supplies or equipment (DME)?  (i.e. oxygen tank, CPAP, cane, etc.): Yes  Discharge follow-up appointment scheduled within 14 calendar days? : No (Patient is going to call OB tomorrow.)  Is patient agreeable to assistance with scheduling? : No    Post-op ([facility]A Only)  If the patient had a surgery or procedure, do they have any questions for a nurse?: No             PLAN                                                       Outpatient Plan: Follow up with provider in 2 weeks and 6 weeks for post-delivery checks    No future appointments.      For any urgent concerns, please contact our 24 hour nurse triage line: [phone] ([phone])         FRANCISCO JAVIER Mendoza  [phone]

## 2022-11-01 ENCOUNTER — ANESTHESIA (OUTPATIENT)
Dept: SURGERY | Facility: CLINIC | Age: 32
End: 2022-11-01
Payer: COMMERCIAL

## 2022-11-04 ENCOUNTER — PRENATAL OFFICE VISIT (OUTPATIENT)
Dept: OBGYN | Facility: CLINIC | Age: 32
End: 2022-11-04
Payer: COMMERCIAL

## 2022-11-04 VITALS
RESPIRATION RATE: 14 BRPM | BODY MASS INDEX: 36.87 KG/M2 | HEIGHT: 68 IN | TEMPERATURE: 97.9 F | HEART RATE: 86 BPM | WEIGHT: 243.3 LBS | SYSTOLIC BLOOD PRESSURE: 114 MMHG | DIASTOLIC BLOOD PRESSURE: 74 MMHG

## 2022-11-04 PROCEDURE — 99207 PR POST PARTUM EXAM: CPT | Performed by: OBSTETRICS & GYNECOLOGY

## 2022-11-04 NOTE — PROGRESS NOTES
" Postpartum Visit Note    S:  Brandee Nesbitt is here for her 6-week postpartum checkup.     Delivery Date: 10/24  Delivering provider: Yamilet  Type of delivery:  RLTCS  Feeding Method:  breast  Bleeding:  Light  Bowel/Urinary problems:  denies  Mood: denies concern, states she is doing well  Contraception Planned:  review at PP visit  ================================================================  ROS: 10 point ROS neg other than the symptoms noted above in the HPI.     O:  EXAM:  /74 (BP Location: Left arm, Patient Position: Sitting, Cuff Size: Adult Large)   Pulse 86   Temp 97.9  F (36.6  C) (Tympanic)   Resp 14   Ht 1.727 m (5' 8\")   Wt 110.4 kg (243 lb 4.8 oz)   LMP 02/10/2022   BMI 36.99 kg/m      General: alert, oriented, well appearing  Psych: mood appropriate,   Breasts:  no swelling or redness  Abdomen: soft, non tender, uterus properly involuted,   Incision:  well healed, no redness or swelling    A/P:   32 year old  who is 2w s/p PLTCS for  labor, cholestasis. Patient is doing well. Denies concern today. Will plan to have follow up at PP visit for further management. BP normal on current regimen, reviewed sx of concern. Will discuss contraception at 6w visit as well.    Lily Woodard MD   2022 9:18 AM     "

## 2022-11-15 ENCOUNTER — MEDICAL CORRESPONDENCE (OUTPATIENT)
Dept: HEALTH INFORMATION MANAGEMENT | Facility: CLINIC | Age: 32
End: 2022-11-15

## 2022-12-06 ENCOUNTER — PRENATAL OFFICE VISIT (OUTPATIENT)
Dept: OBGYN | Facility: CLINIC | Age: 32
End: 2022-12-06
Payer: COMMERCIAL

## 2022-12-06 VITALS
BODY MASS INDEX: 37.13 KG/M2 | WEIGHT: 245 LBS | SYSTOLIC BLOOD PRESSURE: 123 MMHG | HEIGHT: 68 IN | TEMPERATURE: 96.8 F | HEART RATE: 72 BPM | RESPIRATION RATE: 18 BRPM | DIASTOLIC BLOOD PRESSURE: 76 MMHG

## 2022-12-06 DIAGNOSIS — Z87.59 HISTORY OF GESTATIONAL HYPERTENSION: ICD-10-CM

## 2022-12-06 DIAGNOSIS — O24.419 GDM, CLASS A2: ICD-10-CM

## 2022-12-06 DIAGNOSIS — Z30.430 ENCOUNTER FOR IUD INSERTION: ICD-10-CM

## 2022-12-06 DIAGNOSIS — Z30.430 ENCOUNTER FOR INSERTION OF INTRAUTERINE CONTRACEPTIVE DEVICE: ICD-10-CM

## 2022-12-06 LAB
NON GESTATIONAL GTT 2 HR POST DOSE: 125 MG/DL (ref 60–199)
NON GESTATIONAL GTT FASTING: 108 MG/DL (ref 60–125)

## 2022-12-06 PROCEDURE — 82947 ASSAY GLUCOSE BLOOD QUANT: CPT | Performed by: STUDENT IN AN ORGANIZED HEALTH CARE EDUCATION/TRAINING PROGRAM

## 2022-12-06 PROCEDURE — 58300 INSERT INTRAUTERINE DEVICE: CPT | Performed by: STUDENT IN AN ORGANIZED HEALTH CARE EDUCATION/TRAINING PROGRAM

## 2022-12-06 PROCEDURE — 82950 GLUCOSE TEST: CPT | Performed by: STUDENT IN AN ORGANIZED HEALTH CARE EDUCATION/TRAINING PROGRAM

## 2022-12-06 PROCEDURE — 36415 COLL VENOUS BLD VENIPUNCTURE: CPT | Performed by: STUDENT IN AN ORGANIZED HEALTH CARE EDUCATION/TRAINING PROGRAM

## 2022-12-06 PROCEDURE — 99207 PR POST PARTUM EXAM: CPT | Performed by: STUDENT IN AN ORGANIZED HEALTH CARE EDUCATION/TRAINING PROGRAM

## 2022-12-06 RX ORDER — COPPER 313.4 MG/1
1 INTRAUTERINE DEVICE INTRAUTERINE ONCE
COMMUNITY
End: 2023-01-20

## 2022-12-06 RX ORDER — COPPER 313.4 MG/1
1 INTRAUTERINE DEVICE INTRAUTERINE ONCE
COMMUNITY

## 2022-12-06 RX ORDER — COPPER 313.4 MG/1
1 INTRAUTERINE DEVICE INTRAUTERINE ONCE
Status: COMPLETED
Start: 2022-12-06 | End: 2022-12-06

## 2022-12-06 RX ORDER — PRENATAL VIT/IRON FUM/FOLIC AC 27MG-0.8MG
1 TABLET ORAL DAILY
COMMUNITY

## 2022-12-06 RX ADMIN — COPPER 1 EACH: 313.4 INTRAUTERINE DEVICE INTRAUTERINE at 10:07

## 2022-12-06 ASSESSMENT — ANXIETY QUESTIONNAIRES
GAD7 TOTAL SCORE: 0
6. BECOMING EASILY ANNOYED OR IRRITABLE: NOT AT ALL
2. NOT BEING ABLE TO STOP OR CONTROL WORRYING: NOT AT ALL
GAD7 TOTAL SCORE: 0
IF YOU CHECKED OFF ANY PROBLEMS ON THIS QUESTIONNAIRE, HOW DIFFICULT HAVE THESE PROBLEMS MADE IT FOR YOU TO DO YOUR WORK, TAKE CARE OF THINGS AT HOME, OR GET ALONG WITH OTHER PEOPLE: NOT DIFFICULT AT ALL
3. WORRYING TOO MUCH ABOUT DIFFERENT THINGS: NOT AT ALL
5. BEING SO RESTLESS THAT IT IS HARD TO SIT STILL: NOT AT ALL
7. FEELING AFRAID AS IF SOMETHING AWFUL MIGHT HAPPEN: NOT AT ALL
1. FEELING NERVOUS, ANXIOUS, OR ON EDGE: NOT AT ALL

## 2022-12-06 ASSESSMENT — PATIENT HEALTH QUESTIONNAIRE - PHQ9
5. POOR APPETITE OR OVEREATING: NOT AT ALL
SUM OF ALL RESPONSES TO PHQ QUESTIONS 1-9: 0

## 2022-12-06 NOTE — PROGRESS NOTES
"[facility]  Post Partum Office Visit    Assessment and Plan:   Brandee Nesbitt, 32 year old  s/p RLTCS for h/o CS +  labor on 10/24/2022 at 35w6d, presents for a 6 week post-partum visit. Her pregnancy was complicated by GDMA2, h/o CS, class II obesity, ICP, asthma, COVID positive, and h/o DCIS s/p right mastectomy. Her delivery was uncomplicated. Pt was transferred to [facility] to be with infant, who required higher level of care. She was also diagnosed with gHTN during her intrapartum course.    Postpartum care  - Appropriate post partum recovery.  - Contraction: ParaGard IUD placed today. See procedure note below  - Pap smear 2020 NILM w/ neg HPV. Due in 2025. Return as needed or at time interval for next routine pap, pelvic, or breast exam.   - Continue a multi vitamin supplement, especially if breastfeeding.    H/o DCIS  -From [facility]'s note: \"She states that she underwent breast reduction surgery 2021 and during that process there was an incidental finding of right breast DCIS. She then underwent a right mastectomy with implant reconstruction 2021. Her surgery was complicated by debridement and fluid in the right breast and she continues to have problems with her implant flipping on a daily basis. She has had to learn how to flip the implant around properly. She states her lymph nodes were negative. She has been seen by [facility] and has had negative testing. Her last left screening mammogram was done 2022 and negative. She is asking for a breast MRI secondary to breast density, history of cancer, and in preparation for her upcoming Kamala flap reconstruction to be done in summer of 2022.\"  -Recommended follow up mammogram screening with [place], starting 3 months after cessation of breast feeding.    GDMA2: 2hr GTT is ordered to screen for T2DM  GHTN: BP today is normal. No repeat labs are ordered since her labs were nl in " "pregnancy.    ParaGard IUD placement note  Consent:  Risks (infection, minor bleeding, uterine perforation, IUD expulsion, IUD migration) and benefits of treatment were discussed.  Patient's questions were elicited and answered.  and Written consent signed and scanned into medical record.   Counseling: Patient is aware of the fact that ParaGard IUD is associated with heavier and more painful menses. No sexual intercourse for 2 weeks to avoid infection.    Procedure details: Pt declined UPT because she has not been sexually active since delivery.  Medium Graves speculum was placed.  The cervix was cleansed with Betadine swabs x3.  Single-tooth tenaculum was placed horizontally on the anterior cervical lip.  The uterus was found to be at 9cm.  The ParaGard IUD was placed per  instruction with its strings trimmed at 3 cm.  The patient tolerated the procedure well without complications.  Minimal bleeding.    ParaGard IUD  information:  Expiration: 04/2028  Lot#: [id number]  GTIN:[id number]  S/N [id number]     Rosalee Turk MD  Office phone: [phone]  Obstetrics and Gynecology  [facility]   12/06/2022    ---------------------------------------------------------------------------------------------------------------------------  Subjective: Vaginal bleeding has stopped 1 week ago but she might be getting her menses, which started 2 days ago. Has not resumed sexual intercourse. Interested in ParaGard IUD for birth control. Denied any urinary or GI concerns. Breast feeding and formula feeding. Mood is good.     Objective:   Vitals:    12/06/22 0845   BP: 123/76   BP Location: Left arm   Patient Position: Chair   Cuff Size: Adult Large   Pulse: 72   Resp: 18   Temp: 96.8  F (36  C)   TempSrc: Tympanic   Weight: 111.1 kg (245 lb)   Height: 1.727 m (5' 8\")      Estimated body mass index is 37.25 kg/m  as calculated from the following:    Height as of this encounter: 1.727 m (5' 8\").    Weight " as of this encounter: 111.1 kg (245 lb).    General appearance: well-hydrated, A&O x 3, no apparent distress  Lungs: Equal expansion bilaterally, no accessory muscle use  Heart: No heaves or thrills. No peripheral varicosities  Abdomen: Soft, non-tender, non-distended. No rebound, rigidity, or guarding.  Incision: Pfannenstiel incision is healing well without signs of infection.  Extremities: trace edema, no calf tenderness  Neuro: CN II-XII grossly intact  Genitourinary:  External genitalia: no erythema, no lesions.   Urethral meatus appropriate location without lesions or prolapse  Urethra: No masses, tenderness, or scarring  Bladder no fullness, masses, or tenderness.  Anus and Perineum: Unremarkable, no visible lesions  Vagina: Normal, healthy pink mucosa without any lesions. Physiologic vaginal discharge.   Cervix: normal appearance, no cervical motion tenderness.

## 2022-12-06 NOTE — NURSING NOTE
"Initial /76 (BP Location: Left arm, Patient Position: Chair, Cuff Size: Adult Large)   Pulse 72   Temp 96.8  F (36  C) (Tympanic)   Resp 18   Ht 1.727 m (5' 8\")   Wt 111.1 kg (245 lb)   LMP 02/10/2022   Breastfeeding Yes   BMI 37.25 kg/m   Estimated body mass index is 37.25 kg/m  as calculated from the following:    Height as of this encounter: 1.727 m (5' 8\").    Weight as of this encounter: 111.1 kg (245 lb). .      "

## 2022-12-06 NOTE — ADDENDUM NOTE
Addended by: ALMA QUINONES on: 12/6/2022 10:07 AM     Modules accepted: Orders, Level of Service, SmartSet

## 2022-12-26 ENCOUNTER — HOSPITAL ENCOUNTER (OUTPATIENT)
Dept: CT IMAGING | Facility: CLINIC | Age: 32
Discharge: HOME | End: 2022-12-26
Attending: NURSE PRACTITIONER | Admitting: NURSE PRACTITIONER
Payer: COMMERCIAL

## 2022-12-26 DIAGNOSIS — R91.8 PULMONARY NODULES: ICD-10-CM

## 2022-12-26 PROCEDURE — 71250 CT THORAX DX C-: CPT

## 2023-01-12 ENCOUNTER — VIRTUAL VISIT (OUTPATIENT)
Dept: FAMILY MEDICINE | Facility: CLINIC | Age: 33
End: 2023-01-12
Payer: COMMERCIAL

## 2023-01-12 DIAGNOSIS — J45.21 MILD INTERMITTENT ASTHMA WITH EXACERBATION: Primary | ICD-10-CM

## 2023-01-12 PROCEDURE — 99213 OFFICE O/P EST LOW 20 MIN: CPT | Mod: GT | Performed by: FAMILY MEDICINE

## 2023-01-12 RX ORDER — MONTELUKAST SODIUM 10 MG/1
10 TABLET ORAL AT BEDTIME
Qty: 30 TABLET | Refills: 3 | Status: SHIPPED | OUTPATIENT
Start: 2023-01-12

## 2023-01-12 RX ORDER — PREDNISONE 20 MG/1
40 TABLET ORAL DAILY
Qty: 10 TABLET | Refills: 0 | Status: SHIPPED | OUTPATIENT
Start: 2023-01-12 | End: 2023-01-20

## 2023-01-12 ASSESSMENT — ENCOUNTER SYMPTOMS
FEVER: 0
SHORTNESS OF BREATH: 1
COUGH: 1

## 2023-01-12 NOTE — PROGRESS NOTES
"Joesph is a 32 year old who is being evaluated via a billable video visit.      How would you like to obtain your AVS? MyChart  If the video visit is dropped, the invitation should be resent by: Text to cell phone: [phone]  Will anyone else be joining your video visit? No        Assessment & Plan     1. Mild intermittent asthma with exacerbation  Complicated by being 11 weeks post partum  Both montelukast and prednisone are in low levels in breast milk and not of concern for baby  - predniSONE (DELTASONE) 20 MG tablet; Take 2 tablets (40 mg) by mouth daily  Dispense: 10 tablet; Refill: 0  - montelukast (SINGULAIR) 10 MG tablet; Take 1 tablet (10 mg) by mouth At Bedtime  Dispense: 30 tablet; Refill: 3    Reports menorrhagia for 6 days.  Pretty heavy.  Will send a message to her OB after our appointment. Has some headache. She is 11 weeks post partum so preeclampsia is very unlikely.                 BMI:   Estimated body mass index is 37.25 kg/m  as calculated from the following:    Height as of 12/6/22: 1.727 m (5' 8\").    Weight as of 12/6/22: 111.1 kg (245 lb).       No follow-ups on file.    Hermilo Benoit DO  [facility]    Joselin Pink is a 32 year old presenting for the following health issues:  Asthma (Last night asthma got bad-chest hurt, using inhaler, usually has to use singular this time of year)      History of Present Illness     Asthma:  She presents for follow up of asthma.  She has some cough, some wheezing, and some shortness of breath. She is using a relief medication every 4 hours. She does not have a controller medication. Patient is aware of the following triggers: unaware of any triggers and cold air. The patient has not had a visit to the Emergency Room, Urgent Care or Hospital due to asthma since the last clinic visit.     She eats 0-1 servings of fruits and vegetables daily.She consumes 1 sweetened beverage(s) daily.She exercises with enough effort to increase " her heart rate 30 to 60 minutes per day.  She exercises with enough effort to increase her heart rate 3 or less days per week.   She is taking medications regularly.     Has had this for 24 hours   11 weeks post partum   Cough, wheezing, shortness of breath  Usually when this happens she take albuterol and singulair  Feels like her normal asthma exacerbation   + headache   Pain with coughing    Menorrhagia for 6 days.    Unclear cause  Has not seen ob yet     Review of Systems   Constitutional: Negative for fever.   Respiratory: Positive for cough and shortness of breath.    Cardiovascular:        Mild chest pressure             Objective           Vitals:  No vitals were obtained today due to virtual visit.    Physical Exam  Constitutional:       General: She is not in acute distress.  Pulmonary:      Effort: No respiratory distress.   Neurological:      Mental Status: She is alert.   Psychiatric:         Mood and Affect: Mood normal.         Behavior: Behavior normal.                        Video-Visit Details    Type of service:  Video Visit     Originating Location (pt. Location): Home  Distant Location (provider location):  On-site  Platform used for Video Visit: Decisyon  Start 1700  End 2887

## 2023-01-20 ENCOUNTER — OFFICE VISIT (OUTPATIENT)
Dept: OBGYN | Facility: CLINIC | Age: 33
End: 2023-01-20
Payer: COMMERCIAL

## 2023-01-20 VITALS
WEIGHT: 243 LBS | HEART RATE: 86 BPM | TEMPERATURE: 97.1 F | DIASTOLIC BLOOD PRESSURE: 76 MMHG | BODY MASS INDEX: 36.95 KG/M2 | SYSTOLIC BLOOD PRESSURE: 114 MMHG

## 2023-01-20 DIAGNOSIS — Z30.432 ENCOUNTER FOR REMOVAL OF INTRAUTERINE CONTRACEPTIVE DEVICE: ICD-10-CM

## 2023-01-20 DIAGNOSIS — N93.9 ABNORMAL UTERINE BLEEDING (AUB): Primary | ICD-10-CM

## 2023-01-20 PROCEDURE — 99213 OFFICE O/P EST LOW 20 MIN: CPT | Performed by: OBSTETRICS & GYNECOLOGY

## 2023-01-20 NOTE — NURSING NOTE
"Initial /76 (BP Location: Left arm, Patient Position: Chair, Cuff Size: Adult Large)   Pulse 86   Temp 97.1  F (36.2  C) (Tympanic)   Wt 110.2 kg (243 lb)   LMP 01/05/2023 (Approximate)   Breastfeeding Yes   BMI 36.95 kg/m   Estimated body mass index is 36.95 kg/m  as calculated from the following:    Height as of 12/6/22: 1.727 m (5' 8\").    Weight as of this encounter: 110.2 kg (243 lb). .    Yelena Craig MA    "

## 2023-01-20 NOTE — PROGRESS NOTES
Gynecology Consult Note         HPI: Brandee Son is a 32 year old  who presents for IUD removal.  The patient states that she had ParaGard IUD placed as her oncologist told her this was the only contraceptive option other than condoms or timed intercourse.  She states that since she has had irregular bleeding and heavy bleeding that is quite bothersome to her she wishes for removal.  She states that as far she understands Mirena IUD was not allowable with her DCIS history.  She is interested in attempting pregnancy later this year.  Planning to move to [place].  Denies other concerns.    ROS: 10 pt ROS neg other than HPI    PMH:   Past Medical History:   Diagnosis Date     Anemia      Anti-mi-2 antibody present     anti-M (cold) red blood cell antibody     Breast cancer (H)      Gastroesophageal reflux disease      Mild intermittent asthma without complication      Obesity        PSHx:   Past Surgical History:   Procedure Laterality Date     AS REDUCTION OF LARGE BREAST  2021      SECTION        SECTION N/A 10/24/2022    Procedure:  SECTION;  Surgeon: Lily Woodard MD;  Location: [facility] OR     CHOLECYSTECTOMY       right mastectomy  2021       Medications:   albuterol (PROAIR HFA/PROVENTIL HFA/VENTOLIN HFA) 108 (90 Base) MCG/ACT inhaler, Inhale 1-2 puffs into the lungs  montelukast (SINGULAIR) 10 MG tablet, Take 1 tablet (10 mg) by mouth At Bedtime  paragard intrauterine copper device, 1 each by Intrauterine route once  Prenatal Vit-Fe Fumarate-FA (PRENATAL MULTIVITAMIN W/IRON) 27-0.8 MG tablet, Take 1 tablet by mouth daily    No current facility-administered medications on file prior to visit.       Allergies:      Allergies   Allergen Reactions     Diagnostic X-Ray Materials Shortness Of Breath, Other (See Comments) and Dizziness     Contrast dye-Shortness of breath  Pt became sleepy and confused. Needed to remind the pt to keep breathing, but denied any  itching, swelling, or breathing difficulties.     Naproxen Shortness Of Breath     Phentermine Other (See Comments)     Palpitations, chest pain     Vancomycin Itching, Other (See Comments) and Rash     After few minutes, redness and itching noted in forearm. Symptoms diminished in few minutes after rate decreased by 1/2.    Patient has history of vancomycin infusion reaction. For further doses, vancomycin should be infused at a rate no higher than 10 mg/min or each gram over 100 minutes.  May also consider administering diphenhydramine and famotidine one hour before infusion.  Rash all over body itchy       Kiwi        Social History:   Social History     Socioeconomic History     Marital status: Single     Spouse name: Not on file     Number of children: Not on file     Years of education: Not on file     Highest education level: Not on file   Occupational History     Not on file   Tobacco Use     Smoking status: Never     Smokeless tobacco: Never   Vaping Use     Vaping Use: Never used   Substance and Sexual Activity     Alcohol use: Not Currently     Comment: very occasional-quit with pregnancy     Drug use: Never     Sexual activity: Not Currently     Partners: Male   Other Topics Concern     Not on file   Social History Narrative     Not on file     Social Determinants of Health     Financial Resource Strain: Not on file   Food Insecurity: Not on file   Transportation Needs: Not on file   Physical Activity: Not on file   Stress: Not on file   Social Connections: Not on file   Intimate Partner Violence: Not on file   Housing Stability: Not on file       Family History:  Family History   Problem Relation Age of Onset     No Known Problems Mother      Other - See Comments Father         murder at age 42     No Known Problems Maternal Grandmother      No Known Problems Maternal Grandfather      No Known Problems Paternal Grandmother      No Known Problems Paternal Grandfather      No Known Problems Brother      No  Known Problems Brother      No Known Problems Sister      No Known Problems Sister        Physical Exam:   Vitals:    23 0911   BP: 114/76   BP Location: Left arm   Patient Position: Chair   Cuff Size: Adult Large   Pulse: 86   Temp: 97.1  F (36.2  C)   TempSrc: Tympanic   Weight: 110.2 kg (243 lb)      Gen: lying in bed, NAD  CV: Reg rate, well perfused  Pulm: no increased work of breathing  Abd: non-tender, non-distended, no masses   Pelvis: normal appearing external genitalia, vaginal mucosa, cervix, bimanual exam with normal size and contour of uterus with no adnexal masses, IUD strings seen from os  Extremities: non-tender, no erythema; no edema  Psych: normal mood and affect  Neuro: no focal deficits    IUD Removal  Procedure Note    Brandee Son  1990  [id number]    The patient was counseled on the risks and benefits and patient agreed to proceed    Technique: The patient was placed in the dorsal lithotomy position.  A speculum was placed in the vagina and the cervix with IUD strings were visualized, grasped with a ring forcep and removed intact. The IUD was placed in a sterile cup and disposed in hazardous waste. The patient tolerated the procedure well and there were no complications. EBL: 0cc.     A&P: Brandee Son is a 32 year old  who presents for evaluation of AUB.  Patient states that she feels the IUD is the culprit for her irregular bleeding as this is when it started and prior to pregnancy she always had fairly predictable periods.  Discussed options with the patient including ultrasound to confirm if IUD in proper position versus possible removal.  Unfortunately, given her history of DCIS, patient states that this is the only contraceptive method available to her other than condoms and timed intercourse.  Reviewed briefly timed intercourse and potential use of ovulation predictor kits to avoid intercourse around the time of ovulation.  She states understanding.   Patient plans to attend pregnancy around when her child turns 1.  Discussed presenting for preconception visit in [place] where she is moving next month.  All questions were answered and IUD was removed as above without difficulty.  Recommended she return with ongoing irregular bleeding after IUD removal.    Separate from the procedure, I spent 20 minutes reviewing patient chart, obtaining history, counseling, coordinating care, documenting this encounter.    Lily Woodard MD   1/20/2023 10:49 AM

## 2023-01-23 ENCOUNTER — HOSPITAL ENCOUNTER (EMERGENCY)
Facility: CLINIC | Age: 33
Discharge: HOME | End: 2023-01-23
Attending: PHYSICIAN ASSISTANT | Admitting: PHYSICIAN ASSISTANT
Payer: COMMERCIAL

## 2023-01-23 VITALS
DIASTOLIC BLOOD PRESSURE: 72 MMHG | SYSTOLIC BLOOD PRESSURE: 115 MMHG | HEART RATE: 77 BPM | OXYGEN SATURATION: 100 % | TEMPERATURE: 97.7 F | HEIGHT: 68 IN | BODY MASS INDEX: 36.83 KG/M2 | RESPIRATION RATE: 16 BRPM | WEIGHT: 243 LBS

## 2023-01-23 DIAGNOSIS — J02.0 ACUTE STREPTOCOCCAL PHARYNGITIS: ICD-10-CM

## 2023-01-23 LAB — DEPRECATED S PYO AG THROAT QL EIA: POSITIVE

## 2023-01-23 PROCEDURE — 99213 OFFICE O/P EST LOW 20 MIN: CPT | Performed by: PHYSICIAN ASSISTANT

## 2023-01-23 PROCEDURE — 87880 STREP A ASSAY W/OPTIC: CPT | Performed by: PHYSICIAN ASSISTANT

## 2023-01-23 PROCEDURE — G0463 HOSPITAL OUTPT CLINIC VISIT: HCPCS | Performed by: PHYSICIAN ASSISTANT

## 2023-01-23 RX ORDER — AMOXICILLIN 500 MG/1
500 CAPSULE ORAL 2 TIMES DAILY
Qty: 20 CAPSULE | Refills: 0 | Status: SHIPPED | OUTPATIENT
Start: 2023-01-23 | End: 2023-02-02

## 2023-01-23 ASSESSMENT — ENCOUNTER SYMPTOMS
NECK STIFFNESS: 0
ARTHRALGIAS: 1
NECK PAIN: 1
CHILLS: 1

## 2023-01-23 ASSESSMENT — ACTIVITIES OF DAILY LIVING (ADL): ADLS_ACUITY_SCORE: 35

## 2023-01-23 NOTE — ED PROVIDER NOTES
History     Chief Complaint   Patient presents with     Neck Pain     Pain in left side of neck started on Friday, states it radiates up to her left ear. States she was shoveling a lot this weekend since  is out of town, so she thinks it could be neck strain.      HPI  Brandee Son is a 32 year old female who presents with complaints of left-sided anterior neck pain for the past 3 days.  Patient also complains of associated pain especially on the left side of her throat with swallowing.  Patient also complains of associated body aches and chills.  The pain is radiating into her left ear.  Patient wondering if she could have a neck strain that she was shoveling a lot this weekend however she has no posterior neck pain.  Denies fevers, rash, neck stiffness, nausea, vomiting, diarrhea, abdominal pain, chest pain, or shortness of breath.  No extremity numbness/tingling/weakness.  Patient denies any known ill contacts.  She states she has been packing a lot as they are going to move to [place] soon.  Patient is taking Tylenol at home.      Allergies:  Allergies   Allergen Reactions     Diagnostic X-Ray Materials Shortness Of Breath, Other (See Comments) and Dizziness     Contrast dye-Shortness of breath  Pt became sleepy and confused. Needed to remind the pt to keep breathing, but denied any itching, swelling, or breathing difficulties.     Naproxen Shortness Of Breath     Phentermine Other (See Comments)     Palpitations, chest pain     Vancomycin Itching, Other (See Comments) and Rash     After few minutes, redness and itching noted in forearm. Symptoms diminished in few minutes after rate decreased by 1/2.    Patient has history of vancomycin infusion reaction. For further doses, vancomycin should be infused at a rate no higher than 10 mg/min or each gram over 100 minutes.  May also consider administering diphenhydramine and famotidine one hour before infusion.  Rash all over body itchy       Kiwi         Problem List:    Patient Active Problem List    Diagnosis Date Noted     Encounter for IUD insertion 2022     Priority: Medium     paragard insertion 22 lot# [id number] exp-2028        labor 10/24/2022     Priority: Medium     Postpartum care following  delivery 10/24/2022     Priority: Medium     History of COVID-19 10/18/2022     Priority: Medium     History of  section 10/18/2022     Priority: Medium     Cholestasis during pregnancy in third trimester 10/11/2022     Priority: Medium     Insulin controlled gestational diabetes mellitus (GDM) in third trimester 2022     Priority: Medium     Elevated one hour high enough to dx GDM@ 229  [place] referral sent.          Pregnancy 2022     Priority: Medium     Prenatal care, subsequent pregnancy 2022     Priority: Medium     FOB- Eldeen Son       Red blood cell antibody positive 2021     Priority: Medium     Anti-M (COLD) identified on 21  Not clinically significant         History of right mastectomy 2021     Priority: Medium     Ductal carcinoma in situ (DCIS) of right breast 2021     Priority: Medium     Dx at time of breast reduction  S/p simple right mastectomy   Followed at [facility]       Estrogen receptor positive status (ER+) 2021     Priority: Medium     Macromastia 2021     Priority: Medium     Chest pain 10/04/2020     Priority: Medium     Gastroesophageal reflux disease without esophagitis 10/04/2020     Priority: Medium     Lung nodule 10/04/2020     Priority: Medium     Headaches due to old head trauma 01/15/2020     Priority: Medium     Injury of left shoulder 01/15/2020     Priority: Medium     Mild intermittent asthma without complication 2017     Priority: Medium        Past Medical History:    Past Medical History:   Diagnosis Date     Anemia      Anti-mi-2 antibody present      Breast cancer (H)      Gastroesophageal reflux disease      Mild  "intermittent asthma without complication      Obesity        Past Surgical History:    Past Surgical History:   Procedure Laterality Date     AS REDUCTION OF LARGE BREAST  2021      SECTION        SECTION N/A 10/24/2022    Procedure:  SECTION;  Surgeon: Lily Woodard MD;  Location: [facility] OR     CHOLECYSTECTOMY       right mastectomy  2021       Family History:    Family History   Problem Relation Age of Onset     No Known Problems Mother      Other - See Comments Father         murder at age 42     No Known Problems Maternal Grandmother      No Known Problems Maternal Grandfather      No Known Problems Paternal Grandmother      No Known Problems Paternal Grandfather      No Known Problems Brother      No Known Problems Brother      No Known Problems Sister      No Known Problems Sister        Social History:  Marital Status:  Single [1]  Social History     Tobacco Use     Smoking status: Never     Smokeless tobacco: Never   Vaping Use     Vaping Use: Never used   Substance Use Topics     Alcohol use: Not Currently     Comment: very occasional-quit with pregnancy     Drug use: Never        Medications:    amoxicillin (AMOXIL) 500 MG capsule  albuterol (PROAIR HFA/PROVENTIL HFA/VENTOLIN HFA) 108 (90 Base) MCG/ACT inhaler  montelukast (SINGULAIR) 10 MG tablet  paragard intrauterine copper device  Prenatal Vit-Fe Fumarate-FA (PRENATAL MULTIVITAMIN W/IRON) 27-0.8 MG tablet          Review of Systems   Constitutional: Positive for chills.   HENT: Positive for ear pain.    Musculoskeletal: Positive for arthralgias and neck pain. Negative for neck stiffness.   Skin: Negative.    All other systems reviewed and are negative.      Physical Exam   BP: 115/72  Pulse: 77  Temp: 97.7  F (36.5  C)  Resp: 16  Height: 172.7 cm (5' 8\")  Weight: 110.2 kg (243 lb)  SpO2: 100 %      Physical Exam  Constitutional:       General: She is not in acute distress.     Appearance: Normal appearance. She " is well-developed. She is not ill-appearing, toxic-appearing or diaphoretic.   HENT:      Head: Normocephalic and atraumatic.      Right Ear: Tympanic membrane, ear canal and external ear normal.      Left Ear: Tympanic membrane, ear canal and external ear normal.      Nose: Nose normal. No congestion or rhinorrhea.      Mouth/Throat:      Lips: Pink.      Mouth: Mucous membranes are moist.      Pharynx: Uvula midline. Posterior oropharyngeal erythema present. No pharyngeal swelling, oropharyngeal exudate or uvula swelling.      Tonsils: No tonsillar exudate or tonsillar abscesses.   Eyes:      Extraocular Movements: Extraocular movements intact.      Conjunctiva/sclera: Conjunctivae normal.      Pupils: Pupils are equal, round, and reactive to light.   Cardiovascular:      Rate and Rhythm: Normal rate and regular rhythm.      Pulses: Normal pulses.      Heart sounds: Normal heart sounds.   Pulmonary:      Effort: Pulmonary effort is normal. No respiratory distress.      Breath sounds: Normal breath sounds. No stridor. No wheezing, rhonchi or rales.   Musculoskeletal:         General: Normal range of motion.      Cervical back: Normal range of motion and neck supple. No edema, signs of trauma, rigidity or crepitus. No pain with movement, spinous process tenderness or muscular tenderness. Normal range of motion.   Lymphadenopathy:      Cervical: Cervical adenopathy present.      Left cervical: Superficial cervical adenopathy present.   Skin:     General: Skin is warm and dry.   Neurological:      General: No focal deficit present.      Mental Status: She is alert and oriented to person, place, and time.      Sensory: Sensation is intact.      Motor: Motor function is intact.   Psychiatric:         Behavior: Behavior is cooperative.         ED Course                 Procedures      Results for orders placed or performed during the hospital encounter of 01/23/23 (from the past 24 hour(s))   Streptococcus A Rapid Scr w  Reflx to PCR    Specimen: Throat; Swab   Result Value Ref Range    Group A Strep antigen Positive (A) Negative       Medications - No data to display    Assessments & Plan (with Medical Decision Making)     Pt is a 32 year old female who presents with complaints of left-sided anterior neck pain for the past 3 days.  Patient also complains of associated pain especially on the left side of her throat with swallowing.  Patient also complains of associated body aches and chills.  The pain is radiating into her left ear.  Patient wondering if she could have a neck strain that she was shoveling a lot this weekend however she has no posterior neck pain.      Pt is afebrile on arrival.  Exam as above.  Rapid strep was positive.  Discussed results with patient.  Encouraged symptomatic treatments at home.  Return precautions were reviewed.  Hand-outs were provided.    Patient was sent with Amoxicillin and was instructed to follow-up with PCP in 3-5 days for continued care and management or sooner if new or worsening symptoms.  She is to return to the ED for persistent and/or worsening symptoms.  Patient expressed understanding of the diagnosis and plan and was discharged home in good condition.    I have reviewed the nursing notes.    I have reviewed the findings, diagnosis, plan and need for follow up with the patient.    Discharge Medication List as of 1/23/2023  2:08 PM      START taking these medications    Details   amoxicillin (AMOXIL) 500 MG capsule Take 1 capsule (500 mg) by mouth 2 times daily for 10 days For strep throat, Disp-20 capsule, R-0, E-Prescribe             Final diagnoses:   Acute streptococcal pharyngitis       1/23/2023   [facility] EMERGENCY DEPT      Disclaimer:  This note consists of symbols derived from keyboarding, dictation and/or voice recognition software.  As a result, there may be errors in the script that have gone undetected.  Please consider this when interpreting information  found in this chart.     Samantha Ivey PA-C  01/23/23 2041

## 2023-02-01 ENCOUNTER — HOSPITAL ENCOUNTER (EMERGENCY)
Facility: CLINIC | Age: 33
Discharge: HOME | End: 2023-02-01
Attending: NURSE PRACTITIONER | Admitting: NURSE PRACTITIONER
Payer: COMMERCIAL

## 2023-02-01 VITALS
TEMPERATURE: 97.4 F | SYSTOLIC BLOOD PRESSURE: 128 MMHG | DIASTOLIC BLOOD PRESSURE: 74 MMHG | OXYGEN SATURATION: 99 % | HEART RATE: 96 BPM

## 2023-02-01 DIAGNOSIS — L08.9 NECK INFECTION: ICD-10-CM

## 2023-02-01 PROCEDURE — G0463 HOSPITAL OUTPT CLINIC VISIT: HCPCS | Performed by: PHYSICIAN ASSISTANT

## 2023-02-01 PROCEDURE — 99214 OFFICE O/P EST MOD 30 MIN: CPT | Performed by: PHYSICIAN ASSISTANT

## 2023-02-01 ASSESSMENT — ENCOUNTER SYMPTOMS
VOMITING: 0
TROUBLE SWALLOWING: 0
EYE DISCHARGE: 0
MYALGIAS: 0
ARTHRALGIAS: 0
FATIGUE: 0
NAUSEA: 0
JOINT SWELLING: 0
LIGHT-HEADEDNESS: 0
HEADACHES: 0
SHORTNESS OF BREATH: 0
FEVER: 1
NUMBNESS: 0
EYE REDNESS: 0
ABDOMINAL PAIN: 0
SINUS PRESSURE: 0
RHINORRHEA: 0
SINUS PAIN: 0
SORE THROAT: 1
WEAKNESS: 0
CHILLS: 0
COUGH: 0
DIZZINESS: 0

## 2023-02-01 NOTE — ED PROVIDER NOTES
History     Chief Complaint   Patient presents with     Pharyngitis     HPI  Brandee Son is a 32 year old female who presents to the urgent care for evaluation of ongoing pharyngitis. She was diagnosed with strep throat on  and is on her last day of antibiotics. She has not had any improvement in symptoms and continues to have intermittent fevers despite antibiotic usage. Pain now located further down her neck. Using OTC medications as needed.     Allergies:  Allergies   Allergen Reactions     Diagnostic X-Ray Materials Shortness Of Breath, Other (See Comments) and Dizziness     Contrast dye-Shortness of breath  Pt became sleepy and confused. Needed to remind the pt to keep breathing, but denied any itching, swelling, or breathing difficulties.     Naproxen Shortness Of Breath     Phentermine Other (See Comments)     Palpitations, chest pain     Vancomycin Itching, Other (See Comments) and Rash     After few minutes, redness and itching noted in forearm. Symptoms diminished in few minutes after rate decreased by 1/2.    Patient has history of vancomycin infusion reaction. For further doses, vancomycin should be infused at a rate no higher than 10 mg/min or each gram over 100 minutes.  May also consider administering diphenhydramine and famotidine one hour before infusion.  Rash all over body itchy       [place]        Problem List:    Patient Active Problem List    Diagnosis Date Noted     Encounter for IUD insertion 2022     Priority: Medium     paragard insertion 22 lot# [id number] exp-2028        labor 10/24/2022     Priority: Medium     Postpartum care following  delivery 10/24/2022     Priority: Medium     History of COVID-19 10/18/2022     Priority: Medium     History of  section 10/18/2022     Priority: Medium     Cholestasis during pregnancy in third trimester 10/11/2022     Priority: Medium     Insulin controlled gestational diabetes mellitus (GDM) in third  trimester 2022     Priority: Medium     Elevated one hour high enough to dx GDM@ 229  [place] referral sent.          Pregnancy 2022     Priority: Medium     Prenatal care, subsequent pregnancy 2022     Priority: Medium     FOB- Eldeen Son       Red blood cell antibody positive 2021     Priority: Medium     Anti-M (COLD) identified on 21  Not clinically significant         History of right mastectomy 2021     Priority: Medium     Ductal carcinoma in situ (DCIS) of right breast 2021     Priority: Medium     Dx at time of breast reduction  S/p simple right mastectomy   Followed at [facility]       Estrogen receptor positive status (ER+) 2021     Priority: Medium     Macromastia 2021     Priority: Medium     Chest pain 10/04/2020     Priority: Medium     Gastroesophageal reflux disease without esophagitis 10/04/2020     Priority: Medium     Lung nodule 10/04/2020     Priority: Medium     Headaches due to old head trauma 01/15/2020     Priority: Medium     Injury of left shoulder 01/15/2020     Priority: Medium     Mild intermittent asthma without complication 2017     Priority: Medium        Past Medical History:    Past Medical History:   Diagnosis Date     Anemia      Anti-mi-2 antibody present      Breast cancer (H)      Gastroesophageal reflux disease      Mild intermittent asthma without complication      Obesity        Past Surgical History:    Past Surgical History:   Procedure Laterality Date     AS REDUCTION OF LARGE BREAST  2021      SECTION        SECTION N/A 10/24/2022    Procedure:  SECTION;  Surgeon: Lily Woodard MD;  Location: [facility] OR     CHOLECYSTECTOMY       right mastectomy  2021       Family History:    Family History   Problem Relation Age of Onset     No Known Problems Mother      Other - See Comments Father         murder at age 42     No Known Problems Maternal Grandmother      No Known  Problems Maternal Grandfather      No Known Problems Paternal Grandmother      No Known Problems Paternal Grandfather      No Known Problems Brother      No Known Problems Brother      No Known Problems Sister      No Known Problems Sister        Social History:  Marital Status:  Single [1]  Social History     Tobacco Use     Smoking status: Never     Smokeless tobacco: Never   Vaping Use     Vaping Use: Never used   Substance Use Topics     Alcohol use: Not Currently     Comment: very occasional-quit with pregnancy     Drug use: Never        Medications:    amoxicillin-clavulanate (AUGMENTIN) 875-125 MG tablet  albuterol (PROAIR HFA/PROVENTIL HFA/VENTOLIN HFA) 108 (90 Base) MCG/ACT inhaler  amoxicillin (AMOXIL) 500 MG capsule  montelukast (SINGULAIR) 10 MG tablet  paragard intrauterine copper device  Prenatal Vit-Fe Fumarate-FA (PRENATAL MULTIVITAMIN W/IRON) 27-0.8 MG tablet          Review of Systems   Constitutional: Positive for fever. Negative for chills and fatigue.   HENT: Positive for sore throat. Negative for congestion, ear discharge, ear pain, rhinorrhea, sinus pressure, sinus pain and trouble swallowing.    Eyes: Negative for discharge and redness.   Respiratory: Negative for cough and shortness of breath.    Cardiovascular: Negative for chest pain.   Gastrointestinal: Negative for abdominal pain, nausea and vomiting.   Musculoskeletal: Negative for arthralgias, joint swelling and myalgias.   Skin: Negative for rash.   Neurological: Negative for dizziness, weakness, light-headedness, numbness and headaches.   All other systems reviewed and are negative.      Physical Exam   BP: 128/74  Pulse: 96  Temp: 97.4  F (36.3  C)  SpO2: 99 %      Physical Exam  Constitutional:       General: She is not in acute distress.     Appearance: She is well-developed. She is not diaphoretic.   HENT:      Mouth/Throat:      Pharynx: Uvula midline. No posterior oropharyngeal erythema.      Tonsils: No tonsillar exudate. 1+  on the right. 1+ on the left.   Eyes:      Conjunctiva/sclera: Conjunctivae normal.      Pupils: Pupils are equal, round, and reactive to light.   Neck:        Comments: Fullness and reported pain  Cardiovascular:      Rate and Rhythm: Normal rate and regular rhythm.      Pulses: Normal pulses.   Pulmonary:      Effort: Pulmonary effort is normal. No respiratory distress.      Breath sounds: Normal breath sounds and air entry. No decreased air movement. No decreased breath sounds, wheezing or rhonchi.   Abdominal:      General: There is no distension.      Palpations: Abdomen is soft.      Tenderness: There is no abdominal tenderness.   Musculoskeletal:         General: Normal range of motion.      Cervical back: Normal range of motion and neck supple.   Skin:     General: Skin is warm.      Capillary Refill: Capillary refill takes less than 2 seconds.   Neurological:      General: No focal deficit present.      Mental Status: She is alert and oriented to person, place, and time.   Psychiatric:         Mood and Affect: Mood normal.         ED Course                 Procedures       No results found for this or any previous visit (from the past 24 hour(s)).    Medications - No data to display    Assessments & Plan (with Medical Decision Making)   Brandee Son is a 32 year old female who presents to the urgent care for evaluation of ongoing pharyngitis. She was diagnosed with strep throat on 2/13 and is on her last day of antibiotics. She has not had any improvement in symptoms and continues to have intermittent fevers despite antibiotic usage. Pain now located further down her neck. Vital signs normal, physical exam as above. Exam concerning for potential thyroglossal duct cyst. Will begin Augmentin and follow up with ENT as needed. No airway compromise. May use over the counter medications as needed and appropriate. Increase rest and hydration. Return precautions reviewed, all questions answered. Patient  agreeable to plan of care and discharged in good condition.    I have reviewed the nursing notes.    I have reviewed the findings, diagnosis, plan and need for follow up with the patient.    New Prescriptions    AMOXICILLIN-CLAVULANATE (AUGMENTIN) 875-125 MG TABLET    Take 1 tablet by mouth 2 times daily for 10 days       Final diagnoses:   Neck infection       2/1/2023   [facility] EMERGENCY DEPT     Divina Blanton, APRN CNP  02/01/23 [id number]

## 2023-05-20 ENCOUNTER — HEALTH MAINTENANCE LETTER (OUTPATIENT)
Age: 33
End: 2023-05-20

## 2024-03-10 ENCOUNTER — HEALTH MAINTENANCE LETTER (OUTPATIENT)
Age: 34
End: 2024-03-10

## 2024-07-28 ENCOUNTER — HEALTH MAINTENANCE LETTER (OUTPATIENT)
Age: 34
End: 2024-07-28

## 2025-08-10 ENCOUNTER — HEALTH MAINTENANCE LETTER (OUTPATIENT)
Age: 35
End: 2025-08-10

## (undated) DEVICE — SU MONOCRYL 0 CT-1 27" Y340H

## (undated) DEVICE — SU MONOCRYL 4-0 PS-2 18" UND Y496G

## (undated) DEVICE — RETR PANNICULUS TRAXI FOR PT POSITIONING PRS-1030

## (undated) DEVICE — STOCKING SLEEVE COMPRESSION CALF MED

## (undated) DEVICE — Device

## (undated) DEVICE — ADH SKIN CLOSURE PREMIERPRO EXOFIN 1.0ML 3470

## (undated) DEVICE — SOL NACL 0.9% IRRIG 1000ML BOTTLE 07138-09

## (undated) DEVICE — PREP CHLORAPREP 26ML TINTED ORANGE  260815

## (undated) DEVICE — SUCTION MANIFOLD NEPTUNE 2 SYS 1 PORT 702-025-000

## (undated) DEVICE — CATH TRAY FOLEY SURESTEP 16FR W/URINE MTR STATLK LF A303416A

## (undated) DEVICE — PACK C-SECTION LF PL15OTA83B

## (undated) DEVICE — GLOVE PROTEXIS BLUE W/NEU-THERA 6.5  2D73EB65

## (undated) DEVICE — SU VICRYL 0 CT-1 36" J946H

## (undated) DEVICE — SOL WATER IRRIG 1000ML BOTTLE 07139-09

## (undated) DEVICE — DRAPE SHEET REV FOLD 3/4 9349

## (undated) DEVICE — LABEL MEDICATION SYSTEM  3304

## (undated) DEVICE — GLOVE PROTEXIS W/NEU-THERA 6.0  2D73TE60

## (undated) DEVICE — ESU PENCIL W/COATED BLADE E2450H

## (undated) RX ORDER — ONDANSETRON 2 MG/ML
INJECTION INTRAMUSCULAR; INTRAVENOUS
Status: DISPENSED
Start: 2022-10-24

## (undated) RX ORDER — FENTANYL CITRATE-0.9 % NACL/PF 10 MCG/ML
PLASTIC BAG, INJECTION (ML) INTRAVENOUS
Status: DISPENSED
Start: 2022-10-24

## (undated) RX ORDER — MORPHINE SULFATE 1 MG/ML
INJECTION, SOLUTION EPIDURAL; INTRATHECAL; INTRAVENOUS
Status: DISPENSED
Start: 2022-10-24

## (undated) RX ORDER — FENTANYL CITRATE 50 UG/ML
INJECTION, SOLUTION INTRAMUSCULAR; INTRAVENOUS
Status: DISPENSED
Start: 2022-10-24